# Patient Record
Sex: FEMALE | Race: AMERICAN INDIAN OR ALASKA NATIVE | NOT HISPANIC OR LATINO | Employment: FULL TIME | URBAN - METROPOLITAN AREA
[De-identification: names, ages, dates, MRNs, and addresses within clinical notes are randomized per-mention and may not be internally consistent; named-entity substitution may affect disease eponyms.]

---

## 2017-04-04 ENCOUNTER — OFFICE VISIT - HEALTHEAST (OUTPATIENT)
Dept: INTERNAL MEDICINE | Facility: CLINIC | Age: 37
End: 2017-04-04

## 2017-04-04 DIAGNOSIS — R20.0 NUMBNESS IN RIGHT LEG: ICD-10-CM

## 2017-04-04 DIAGNOSIS — Z00.00 ROUTINE GENERAL MEDICAL EXAMINATION AT A HEALTH CARE FACILITY: ICD-10-CM

## 2017-04-04 DIAGNOSIS — J01.11 ACUTE RECURRENT FRONTAL SINUSITIS: ICD-10-CM

## 2017-04-04 ASSESSMENT — MIFFLIN-ST. JEOR: SCORE: 1404.95

## 2017-04-27 ENCOUNTER — OFFICE VISIT - HEALTHEAST (OUTPATIENT)
Dept: ALLERGY | Facility: CLINIC | Age: 37
End: 2017-04-27

## 2017-04-27 DIAGNOSIS — R09.81 NASAL CONGESTION: ICD-10-CM

## 2017-05-01 ENCOUNTER — HOSPITAL ENCOUNTER (OUTPATIENT)
Dept: CT IMAGING | Facility: HOSPITAL | Age: 37
Discharge: HOME | End: 2017-05-01
Attending: INTERNAL MEDICINE

## 2017-05-01 DIAGNOSIS — J01.11 ACUTE RECURRENT FRONTAL SINUSITIS: ICD-10-CM

## 2017-05-05 ENCOUNTER — RECORDS - HEALTHEAST (OUTPATIENT)
Dept: ADMINISTRATIVE | Facility: OTHER | Age: 37
End: 2017-05-05

## 2017-08-23 ENCOUNTER — RECORDS - HEALTHEAST (OUTPATIENT)
Dept: ADMINISTRATIVE | Facility: OTHER | Age: 37
End: 2017-08-23

## 2017-09-14 ENCOUNTER — AMBULATORY - HEALTHEAST (OUTPATIENT)
Dept: CARDIOLOGY | Facility: CLINIC | Age: 37
End: 2017-09-14

## 2017-09-14 ENCOUNTER — RECORDS - HEALTHEAST (OUTPATIENT)
Dept: ADMINISTRATIVE | Facility: OTHER | Age: 37
End: 2017-09-14

## 2017-09-14 ENCOUNTER — HOSPITAL ENCOUNTER (OUTPATIENT)
Dept: CARDIOLOGY | Facility: HOSPITAL | Age: 37
Discharge: HOME | End: 2017-09-14
Attending: INTERNAL MEDICINE

## 2017-09-14 ENCOUNTER — OFFICE VISIT - HEALTHEAST (OUTPATIENT)
Dept: CARDIOLOGY | Facility: CLINIC | Age: 37
End: 2017-09-14

## 2017-09-14 DIAGNOSIS — R00.2 PALPITATIONS: ICD-10-CM

## 2017-09-14 ASSESSMENT — MIFFLIN-ST. JEOR: SCORE: 1395.03

## 2018-02-13 ENCOUNTER — OFFICE VISIT - HEALTHEAST (OUTPATIENT)
Dept: INTERNAL MEDICINE | Facility: CLINIC | Age: 38
End: 2018-02-13

## 2018-02-13 DIAGNOSIS — E28.2 PCOS (POLYCYSTIC OVARIAN SYNDROME): ICD-10-CM

## 2018-02-13 DIAGNOSIS — N63.0 BREAST LUMP IN FEMALE: ICD-10-CM

## 2018-02-13 DIAGNOSIS — G43.909 MIGRAINE: ICD-10-CM

## 2018-02-13 LAB
ESTRADIOL SERPL-MCNC: 33 PG/ML
FASTING STATUS PATIENT QL REPORTED: NO
FSH SERPL-ACNC: 4.5 MIU/ML
GLUCOSE BLD-MCNC: 88 MG/DL (ref 70–125)
LH SERPL-ACNC: 4.5 MIU/ML
PROGEST SERPL-MCNC: 0.3 NG/ML
TSH SERPL DL<=0.005 MIU/L-ACNC: 1.46 UIU/ML (ref 0.3–5)

## 2018-03-13 ENCOUNTER — HOSPITAL ENCOUNTER (OUTPATIENT)
Dept: ULTRASOUND IMAGING | Facility: CLINIC | Age: 38
Discharge: HOME | End: 2018-03-13
Attending: INTERNAL MEDICINE

## 2018-03-13 ENCOUNTER — HOSPITAL ENCOUNTER (OUTPATIENT)
Dept: MAMMOGRAPHY | Facility: CLINIC | Age: 38
Discharge: HOME | End: 2018-03-13
Attending: INTERNAL MEDICINE

## 2018-03-13 DIAGNOSIS — N63.0 BREAST LUMP IN FEMALE: ICD-10-CM

## 2018-04-20 ENCOUNTER — RECORDS - HEALTHEAST (OUTPATIENT)
Dept: ADMINISTRATIVE | Facility: OTHER | Age: 38
End: 2018-04-20

## 2018-04-25 ENCOUNTER — HOSPITAL ENCOUNTER (OUTPATIENT)
Dept: ULTRASOUND IMAGING | Facility: HOSPITAL | Age: 38
Discharge: HOME | End: 2018-04-25
Attending: INTERNAL MEDICINE

## 2018-04-25 DIAGNOSIS — R10.13 EPIGASTRIC PAIN: ICD-10-CM

## 2018-04-26 ENCOUNTER — RECORDS - HEALTHEAST (OUTPATIENT)
Dept: ADMINISTRATIVE | Facility: OTHER | Age: 38
End: 2018-04-26

## 2018-05-31 ENCOUNTER — RECORDS - HEALTHEAST (OUTPATIENT)
Dept: ADMINISTRATIVE | Facility: OTHER | Age: 38
End: 2018-05-31

## 2018-07-19 ENCOUNTER — COMMUNICATION - HEALTHEAST (OUTPATIENT)
Dept: INTERNAL MEDICINE | Facility: CLINIC | Age: 38
End: 2018-07-19

## 2018-07-19 DIAGNOSIS — R63.5 ABNORMAL WEIGHT GAIN: ICD-10-CM

## 2019-01-08 ENCOUNTER — OFFICE VISIT - HEALTHEAST (OUTPATIENT)
Dept: INTERNAL MEDICINE | Facility: CLINIC | Age: 39
End: 2019-01-08

## 2019-01-08 DIAGNOSIS — D72.818 OTHER DECREASED WHITE BLOOD CELL (WBC) COUNT: ICD-10-CM

## 2019-01-08 DIAGNOSIS — Z00.00 ROUTINE GENERAL MEDICAL EXAMINATION AT A HEALTH CARE FACILITY: ICD-10-CM

## 2019-01-08 DIAGNOSIS — R10.13 DYSPEPSIA: ICD-10-CM

## 2019-01-08 DIAGNOSIS — G43.909 MIGRAINE: ICD-10-CM

## 2019-01-08 DIAGNOSIS — E28.2 PCOS (POLYCYSTIC OVARIAN SYNDROME): ICD-10-CM

## 2019-01-08 ASSESSMENT — MIFFLIN-ST. JEOR: SCORE: 1462.17

## 2019-01-09 ENCOUNTER — AMBULATORY - HEALTHEAST (OUTPATIENT)
Dept: LAB | Facility: HOSPITAL | Age: 39
End: 2019-01-09

## 2019-01-09 DIAGNOSIS — D72.818 OTHER DECREASED WHITE BLOOD CELL (WBC) COUNT: ICD-10-CM

## 2019-01-09 DIAGNOSIS — R63.5 ABNORMAL WEIGHT GAIN: ICD-10-CM

## 2019-01-09 DIAGNOSIS — E28.2 PCOS (POLYCYSTIC OVARIAN SYNDROME): ICD-10-CM

## 2019-01-09 DIAGNOSIS — Z00.00 ROUTINE GENERAL MEDICAL EXAMINATION AT A HEALTH CARE FACILITY: ICD-10-CM

## 2019-01-09 LAB
ALBUMIN SERPL-MCNC: 4 G/DL (ref 3.5–5)
ALP SERPL-CCNC: 40 U/L (ref 45–120)
ALT SERPL W P-5'-P-CCNC: 39 U/L (ref 0–45)
ANION GAP SERPL CALCULATED.3IONS-SCNC: 12 MMOL/L (ref 5–18)
AST SERPL W P-5'-P-CCNC: 44 U/L (ref 0–40)
BASOPHILS # BLD AUTO: 0 THOU/UL (ref 0–0.2)
BASOPHILS NFR BLD AUTO: 1 % (ref 0–2)
BILIRUB SERPL-MCNC: 0.6 MG/DL (ref 0–1)
BUN SERPL-MCNC: 9 MG/DL (ref 8–22)
CALCIUM SERPL-MCNC: 9.4 MG/DL (ref 8.5–10.5)
CHLORIDE BLD-SCNC: 107 MMOL/L (ref 98–107)
CHOLEST SERPL-MCNC: 184 MG/DL
CO2 SERPL-SCNC: 21 MMOL/L (ref 22–31)
CREAT SERPL-MCNC: 0.84 MG/DL (ref 0.6–1.1)
EOSINOPHIL # BLD AUTO: 0.1 THOU/UL (ref 0–0.4)
EOSINOPHIL NFR BLD AUTO: 2 % (ref 0–6)
ERYTHROCYTE [DISTWIDTH] IN BLOOD BY AUTOMATED COUNT: 12.1 % (ref 11–14.5)
FASTING STATUS PATIENT QL REPORTED: YES
GFR SERPL CREATININE-BSD FRML MDRD: >60 ML/MIN/1.73M2
GLUCOSE BLD-MCNC: 79 MG/DL (ref 70–125)
HCT VFR BLD AUTO: 39.2 % (ref 35–47)
HDLC SERPL-MCNC: 52 MG/DL
HGB BLD-MCNC: 13.4 G/DL (ref 12–16)
LDLC SERPL CALC-MCNC: 117 MG/DL
LYMPHOCYTES # BLD AUTO: 1.1 THOU/UL (ref 0.8–4.4)
LYMPHOCYTES NFR BLD AUTO: 23 % (ref 20–40)
MCH RBC QN AUTO: 32 PG (ref 27–34)
MCHC RBC AUTO-ENTMCNC: 34.2 G/DL (ref 32–36)
MCV RBC AUTO: 94 FL (ref 80–100)
MONOCYTES # BLD AUTO: 0.5 THOU/UL (ref 0–0.9)
MONOCYTES NFR BLD AUTO: 11 % (ref 2–10)
NEUTROPHILS # BLD AUTO: 2.8 THOU/UL (ref 2–7.7)
NEUTROPHILS NFR BLD AUTO: 62 % (ref 50–70)
PLATELET # BLD AUTO: 230 THOU/UL (ref 140–440)
PMV BLD AUTO: 9.9 FL (ref 8.5–12.5)
POTASSIUM BLD-SCNC: 4.6 MMOL/L (ref 3.5–5)
PROT SERPL-MCNC: 6.7 G/DL (ref 6–8)
RBC # BLD AUTO: 4.19 MILL/UL (ref 3.8–5.4)
SODIUM SERPL-SCNC: 140 MMOL/L (ref 136–145)
TRIGL SERPL-MCNC: 77 MG/DL
TSH SERPL DL<=0.005 MIU/L-ACNC: 0.93 UIU/ML (ref 0.3–5)
WBC: 4.5 THOU/UL (ref 4–11)

## 2019-02-01 ENCOUNTER — AMBULATORY - HEALTHEAST (OUTPATIENT)
Dept: MULTI SPECIALTY CLINIC | Facility: CLINIC | Age: 39
End: 2019-02-01

## 2019-02-01 LAB — PAP SMEAR - HIM PATIENT REPORTED: NORMAL

## 2019-07-19 ENCOUNTER — OFFICE VISIT - HEALTHEAST (OUTPATIENT)
Dept: FAMILY MEDICINE | Facility: CLINIC | Age: 39
End: 2019-07-19

## 2019-07-19 DIAGNOSIS — Z00.00 ROUTINE GENERAL MEDICAL EXAMINATION AT A HEALTH CARE FACILITY: ICD-10-CM

## 2019-07-19 DIAGNOSIS — Z12.4 SCREENING FOR CERVICAL CANCER: ICD-10-CM

## 2019-07-19 DIAGNOSIS — G43.109 MIGRAINE WITH AURA AND WITHOUT STATUS MIGRAINOSUS, NOT INTRACTABLE: ICD-10-CM

## 2019-07-19 DIAGNOSIS — E28.2 PCOS (POLYCYSTIC OVARIAN SYNDROME): ICD-10-CM

## 2019-07-19 DIAGNOSIS — Z13.228 SCREENING FOR METABOLIC DISORDER: ICD-10-CM

## 2019-07-19 LAB
CHOLEST SERPL-MCNC: 205 MG/DL
FASTING STATUS PATIENT QL REPORTED: YES
HBA1C MFR BLD: 5.4 % (ref 3.5–6)
HDLC SERPL-MCNC: 52 MG/DL
LDLC SERPL CALC-MCNC: 133 MG/DL
TRIGL SERPL-MCNC: 101 MG/DL
TSH SERPL DL<=0.005 MIU/L-ACNC: 1.49 UIU/ML (ref 0.3–5)

## 2019-07-19 ASSESSMENT — MIFFLIN-ST. JEOR: SCORE: 1410.31

## 2019-07-25 ENCOUNTER — COMMUNICATION - HEALTHEAST (OUTPATIENT)
Dept: FAMILY MEDICINE | Facility: CLINIC | Age: 39
End: 2019-07-25

## 2019-07-29 ENCOUNTER — OFFICE VISIT - HEALTHEAST (OUTPATIENT)
Dept: FAMILY MEDICINE | Facility: CLINIC | Age: 39
End: 2019-07-29

## 2019-07-29 DIAGNOSIS — M25.512 ACUTE PAIN OF LEFT SHOULDER: ICD-10-CM

## 2019-07-29 DIAGNOSIS — R07.81 RIB PAIN ON LEFT SIDE: ICD-10-CM

## 2019-08-26 ENCOUNTER — RECORDS - HEALTHEAST (OUTPATIENT)
Dept: ADMINISTRATIVE | Facility: OTHER | Age: 39
End: 2019-08-26

## 2019-09-04 ENCOUNTER — HOSPITAL ENCOUNTER (OUTPATIENT)
Dept: MRI IMAGING | Facility: CLINIC | Age: 39
Discharge: HOME | End: 2019-09-04
Attending: PSYCHIATRY & NEUROLOGY

## 2019-09-04 DIAGNOSIS — G43.909 MIGRAINE: ICD-10-CM

## 2019-11-26 ENCOUNTER — RECORDS - HEALTHEAST (OUTPATIENT)
Dept: ADMINISTRATIVE | Facility: OTHER | Age: 39
End: 2019-11-26

## 2019-11-29 ENCOUNTER — HOSPITAL ENCOUNTER (OUTPATIENT)
Dept: MRI IMAGING | Facility: CLINIC | Age: 39
Discharge: HOME | End: 2019-11-29
Attending: PSYCHIATRY & NEUROLOGY

## 2019-11-29 DIAGNOSIS — R25.3 MUSCLE TWITCHING: ICD-10-CM

## 2019-12-05 ENCOUNTER — RECORDS - HEALTHEAST (OUTPATIENT)
Dept: LAB | Facility: HOSPITAL | Age: 39
End: 2019-12-05

## 2019-12-05 LAB
ALBUMIN SERPL-MCNC: 4.1 G/DL (ref 3.5–5)
ALP SERPL-CCNC: 39 U/L (ref 45–120)
ALT SERPL W P-5'-P-CCNC: 20 U/L (ref 0–45)
ANION GAP SERPL CALCULATED.3IONS-SCNC: 6 MMOL/L (ref 5–18)
AST SERPL W P-5'-P-CCNC: 21 U/L (ref 0–40)
BILIRUB SERPL-MCNC: 0.5 MG/DL (ref 0–1)
BUN SERPL-MCNC: 14 MG/DL (ref 8–22)
CALCIUM SERPL-MCNC: 9.3 MG/DL (ref 8.5–10.5)
CHLORIDE BLD-SCNC: 108 MMOL/L (ref 98–107)
CK SERPL-CCNC: 256 U/L (ref 30–190)
CO2 SERPL-SCNC: 25 MMOL/L (ref 22–31)
CREAT SERPL-MCNC: 0.93 MG/DL (ref 0.6–1.1)
ERYTHROCYTE [SEDIMENTATION RATE] IN BLOOD BY WESTERGREN METHOD: 4 MM/HR (ref 0–20)
FASTING STATUS PATIENT QL REPORTED: YES
FERRITIN SERPL-MCNC: 55 NG/ML (ref 10–130)
GFR SERPL CREATININE-BSD FRML MDRD: >60 ML/MIN/1.73M2
GLUCOSE BLD-MCNC: 89 MG/DL (ref 70–125)
GLUCOSE BLD-MCNC: 89 MG/DL (ref 70–125)
HBA1C MFR BLD: 5 % (ref 4.2–6.1)
LYME TOTAL ANTIBODY - HISTORICAL: 0.06 INDEX VALUE
MAGNESIUM SERPL-MCNC: 1.9 MG/DL (ref 1.8–2.6)
POTASSIUM BLD-SCNC: 4.3 MMOL/L (ref 3.5–5)
PROT SERPL-MCNC: 7.2 G/DL (ref 6–8)
SODIUM SERPL-SCNC: 139 MMOL/L (ref 136–145)
TSH SERPL DL<=0.005 MIU/L-ACNC: 0.87 UIU/ML (ref 0.3–5)
VIT B12 SERPL-MCNC: 753 PG/ML (ref 213–816)

## 2019-12-07 LAB — METHYLMALONATE SERPL-SCNC: 0.15 UMOL/L (ref 0–0.4)

## 2019-12-08 LAB — VIT B1 PYROPHOSHATE BLD-SCNC: 143 NMOL/L (ref 70–180)

## 2019-12-09 LAB
ALBUMIN PERCENT: 65.7 % (ref 51–67)
ALBUMIN SERPL ELPH-MCNC: 4.4 G/DL (ref 3.2–4.7)
ALPHA 1 PERCENT: 2.7 % (ref 2–4)
ALPHA 2 PERCENT: 9.5 % (ref 5–13)
ALPHA1 GLOB SERPL ELPH-MCNC: 0.2 G/DL (ref 0.1–0.3)
ALPHA2 GLOB SERPL ELPH-MCNC: 0.6 G/DL (ref 0.4–0.9)
ANA SER QL: 1.5 U
B-GLOBULIN SERPL ELPH-MCNC: 0.7 G/DL (ref 0.7–1.2)
BETA PERCENT: 10.2 % (ref 10–17)
GAMMA GLOB SERPL ELPH-MCNC: 0.8 G/DL (ref 0.6–1.4)
GAMMA GLOBULIN PERCENT: 11.9 % (ref 9–20)
PATH ICD:: NORMAL
PROT PATTERN SERPL ELPH-IMP: NORMAL
PROT SERPL-MCNC: 6.7 G/DL (ref 6–8)
REVIEWING PATHOLOGIST: NORMAL

## 2020-01-22 ENCOUNTER — RECORDS - HEALTHEAST (OUTPATIENT)
Dept: LAB | Facility: CLINIC | Age: 40
End: 2020-01-22

## 2020-01-22 LAB — CK SERPL-CCNC: 398 U/L (ref 30–190)

## 2020-02-12 ENCOUNTER — RECORDS - HEALTHEAST (OUTPATIENT)
Dept: ADMINISTRATIVE | Facility: OTHER | Age: 40
End: 2020-02-12

## 2020-02-26 ENCOUNTER — OFFICE VISIT - HEALTHEAST (OUTPATIENT)
Dept: FAMILY MEDICINE | Facility: CLINIC | Age: 40
End: 2020-02-26

## 2020-02-26 DIAGNOSIS — G43.C1 INTRACTABLE PERIODIC HEADACHE SYNDROME: ICD-10-CM

## 2020-02-26 DIAGNOSIS — Z01.818 PREOP EXAMINATION: ICD-10-CM

## 2020-02-26 DIAGNOSIS — Z13.220 SCREENING FOR LIPOID DISORDERS: ICD-10-CM

## 2020-02-26 DIAGNOSIS — E28.2 PCOS (POLYCYSTIC OVARIAN SYNDROME): ICD-10-CM

## 2020-02-26 DIAGNOSIS — M51.369 DDD (DEGENERATIVE DISC DISEASE), LUMBAR: ICD-10-CM

## 2020-02-26 LAB
ANION GAP SERPL CALCULATED.3IONS-SCNC: 8 MMOL/L (ref 5–18)
BUN SERPL-MCNC: 10 MG/DL (ref 8–22)
CALCIUM SERPL-MCNC: 9 MG/DL (ref 8.5–10.5)
CHLORIDE BLD-SCNC: 106 MMOL/L (ref 98–107)
CHOLEST SERPL-MCNC: 159 MG/DL
CK SERPL-CCNC: 121 U/L (ref 30–190)
CO2 SERPL-SCNC: 26 MMOL/L (ref 22–31)
CREAT SERPL-MCNC: 0.75 MG/DL (ref 0.6–1.1)
ERYTHROCYTE [DISTWIDTH] IN BLOOD BY AUTOMATED COUNT: 11.5 % (ref 11–14.5)
FASTING STATUS PATIENT QL REPORTED: YES
GFR SERPL CREATININE-BSD FRML MDRD: >60 ML/MIN/1.73M2
GLUCOSE BLD-MCNC: 99 MG/DL (ref 70–125)
HCT VFR BLD AUTO: 37.7 % (ref 35–47)
HDLC SERPL-MCNC: 40 MG/DL
HGB BLD-MCNC: 12.7 G/DL (ref 12–16)
LDLC SERPL CALC-MCNC: 94 MG/DL
MCH RBC QN AUTO: 32.7 PG (ref 27–34)
MCHC RBC AUTO-ENTMCNC: 33.7 G/DL (ref 32–36)
MCV RBC AUTO: 97 FL (ref 80–100)
PLATELET # BLD AUTO: 199 THOU/UL (ref 140–440)
PMV BLD AUTO: 7.7 FL (ref 7–10)
POTASSIUM BLD-SCNC: 3.9 MMOL/L (ref 3.5–5)
RBC # BLD AUTO: 3.89 MILL/UL (ref 3.8–5.4)
SODIUM SERPL-SCNC: 140 MMOL/L (ref 136–145)
TRIGL SERPL-MCNC: 127 MG/DL
WBC: 3.3 THOU/UL (ref 4–11)

## 2020-02-26 ASSESSMENT — MIFFLIN-ST. JEOR: SCORE: 1434.35

## 2020-03-02 ASSESSMENT — MIFFLIN-ST. JEOR: SCORE: 1430.75

## 2020-03-04 ENCOUNTER — ANESTHESIA - HEALTHEAST (OUTPATIENT)
Dept: SURGERY | Facility: CLINIC | Age: 40
End: 2020-03-04

## 2020-03-05 ENCOUNTER — SURGERY - HEALTHEAST (OUTPATIENT)
Dept: SURGERY | Facility: CLINIC | Age: 40
End: 2020-03-05

## 2020-03-27 ENCOUNTER — AMBULATORY - HEALTHEAST (OUTPATIENT)
Dept: FAMILY MEDICINE | Facility: CLINIC | Age: 40
End: 2020-03-27

## 2020-08-11 ENCOUNTER — HOSPITAL ENCOUNTER (OUTPATIENT)
Dept: MAMMOGRAPHY | Facility: CLINIC | Age: 40
Discharge: HOME | End: 2020-08-11

## 2020-08-11 DIAGNOSIS — Z12.31 SCREENING MAMMOGRAM, ENCOUNTER FOR: ICD-10-CM

## 2020-08-31 ENCOUNTER — OFFICE VISIT - HEALTHEAST (OUTPATIENT)
Dept: FAMILY MEDICINE | Facility: CLINIC | Age: 40
End: 2020-08-31

## 2020-08-31 DIAGNOSIS — F41.1 GAD (GENERALIZED ANXIETY DISORDER): ICD-10-CM

## 2020-08-31 DIAGNOSIS — F32.1 CURRENT MODERATE EPISODE OF MAJOR DEPRESSIVE DISORDER WITHOUT PRIOR EPISODE (H): ICD-10-CM

## 2020-08-31 ASSESSMENT — PATIENT HEALTH QUESTIONNAIRE - PHQ9: SUM OF ALL RESPONSES TO PHQ QUESTIONS 1-9: 11

## 2020-08-31 ASSESSMENT — ANXIETY QUESTIONNAIRES
1. FEELING NERVOUS, ANXIOUS, OR ON EDGE: NEARLY EVERY DAY
IF YOU CHECKED OFF ANY PROBLEMS ON THIS QUESTIONNAIRE, HOW DIFFICULT HAVE THESE PROBLEMS MADE IT FOR YOU TO DO YOUR WORK, TAKE CARE OF THINGS AT HOME, OR GET ALONG WITH OTHER PEOPLE: VERY DIFFICULT
7. FEELING AFRAID AS IF SOMETHING AWFUL MIGHT HAPPEN: NEARLY EVERY DAY
6. BECOMING EASILY ANNOYED OR IRRITABLE: NEARLY EVERY DAY
3. WORRYING TOO MUCH ABOUT DIFFERENT THINGS: NEARLY EVERY DAY
GAD7 TOTAL SCORE: 20
5. BEING SO RESTLESS THAT IT IS HARD TO SIT STILL: NEARLY EVERY DAY
2. NOT BEING ABLE TO STOP OR CONTROL WORRYING: NEARLY EVERY DAY
4. TROUBLE RELAXING: MORE THAN HALF THE DAYS

## 2020-09-08 ENCOUNTER — COMMUNICATION - HEALTHEAST (OUTPATIENT)
Dept: FAMILY MEDICINE | Facility: CLINIC | Age: 40
End: 2020-09-08

## 2020-09-08 DIAGNOSIS — B37.31 YEAST INFECTION OF THE VAGINA: ICD-10-CM

## 2020-11-24 ENCOUNTER — RECORDS - HEALTHEAST (OUTPATIENT)
Dept: LAB | Facility: CLINIC | Age: 40
End: 2020-11-24

## 2020-11-24 LAB — CK SERPL-CCNC: 454 U/L (ref 30–190)

## 2020-11-25 PROBLEM — E28.2 PCOS (POLYCYSTIC OVARIAN SYNDROME): Status: ACTIVE | Noted: 2019-07-19

## 2020-11-25 PROBLEM — M48.061 FORAMINAL STENOSIS OF LUMBAR REGION: Status: ACTIVE | Noted: 2020-11-25

## 2020-11-25 PROBLEM — R25.3 MUSCLE TWITCH: Status: ACTIVE | Noted: 2020-11-25

## 2020-11-25 PROBLEM — R74.8 ELEVATED CREATINE KINASE LEVEL: Status: ACTIVE | Noted: 2020-11-25

## 2020-11-25 SDOH — HEALTH STABILITY: MENTAL HEALTH: HOW OFTEN DO YOU HAVE 6 OR MORE DRINKS ON ONE OCCASION?: NOT ASKED

## 2020-11-25 SDOH — HEALTH STABILITY: MENTAL HEALTH: HOW MANY STANDARD DRINKS CONTAINING ALCOHOL DO YOU HAVE ON A TYPICAL DAY?: NOT ASKED

## 2020-11-25 SDOH — HEALTH STABILITY: MENTAL HEALTH: HOW OFTEN DO YOU HAVE A DRINK CONTAINING ALCOHOL?: NOT ASKED

## 2020-11-27 ENCOUNTER — VIRTUAL VISIT (OUTPATIENT)
Dept: NEUROLOGY | Facility: CLINIC | Age: 40
End: 2020-11-27
Payer: COMMERCIAL

## 2020-11-27 ENCOUNTER — RECORDS - HEALTHEAST (OUTPATIENT)
Dept: ADMINISTRATIVE | Facility: OTHER | Age: 40
End: 2020-11-27

## 2020-11-27 VITALS — HEIGHT: 66 IN | WEIGHT: 170 LBS | BODY MASS INDEX: 27.32 KG/M2

## 2020-11-27 DIAGNOSIS — R74.8 ELEVATED CK: ICD-10-CM

## 2020-11-27 DIAGNOSIS — M48.061 LUMBAR FORAMINAL STENOSIS: ICD-10-CM

## 2020-11-27 DIAGNOSIS — G43.809 OTHER MIGRAINE WITHOUT STATUS MIGRAINOSUS, NOT INTRACTABLE: Primary | ICD-10-CM

## 2020-11-27 DIAGNOSIS — F43.9 STRESS: ICD-10-CM

## 2020-11-27 DIAGNOSIS — R25.3 MUSCLE TWITCHING: ICD-10-CM

## 2020-11-27 PROCEDURE — 99214 OFFICE O/P EST MOD 30 MIN: CPT | Mod: 95 | Performed by: PSYCHIATRY & NEUROLOGY

## 2020-11-27 RX ORDER — SPIRONOLACTONE 50 MG/1
50 TABLET, FILM COATED ORAL
COMMUNITY
Start: 2020-02-26 | End: 2022-04-04

## 2020-11-27 RX ORDER — ONDANSETRON 8 MG/1
TABLET, FILM COATED ORAL
COMMUNITY
Start: 2020-03-06 | End: 2021-11-02

## 2020-11-27 RX ORDER — HYDROXYZINE PAMOATE 25 MG/1
CAPSULE ORAL
COMMUNITY
Start: 2020-03-05 | End: 2021-11-02

## 2020-11-27 RX ORDER — SUMATRIPTAN 50 MG/1
50 TABLET, FILM COATED ORAL
Qty: 18 TABLET | Refills: 11 | Status: SHIPPED | OUTPATIENT
Start: 2020-11-27 | End: 2021-09-01

## 2020-11-27 RX ORDER — ESCITALOPRAM OXALATE 10 MG/1
5 TABLET ORAL
COMMUNITY
Start: 2020-08-31 | End: 2022-09-19

## 2020-11-27 RX ORDER — OXYCODONE HYDROCHLORIDE 5 MG/1
TABLET ORAL
COMMUNITY
Start: 2020-03-06 | End: 2021-11-02

## 2020-11-27 RX ORDER — TOPIRAMATE 25 MG/1
25 TABLET, FILM COATED ORAL DAILY
COMMUNITY
Start: 2020-04-22 | End: 2021-09-01

## 2020-11-27 RX ORDER — SUMATRIPTAN 25 MG/1
25 TABLET, FILM COATED ORAL
COMMUNITY
Start: 2019-09-26 | End: 2021-02-23

## 2020-11-27 RX ORDER — CLONAZEPAM 0.5 MG/1
0.5 TABLET ORAL PRN
COMMUNITY
Start: 2020-08-31 | End: 2021-11-02

## 2020-11-27 RX ORDER — ACETAMINOPHEN 500 MG
500 TABLET ORAL
COMMUNITY
Start: 2020-03-06

## 2020-11-27 ASSESSMENT — MIFFLIN-ST. JEOR: SCORE: 1457.86

## 2020-11-27 NOTE — NURSING NOTE
Chief Complaint   Patient presents with     Headache     6 month follow up for migraines. Pt states doing well over the summer, migraines have increased over the past couple of months.     Video Visit     AW Touchpoint send text to [phone]     Tracie Patrick LPN on 11/27/2020 at 11:24 AM

## 2020-11-27 NOTE — LETTER
"    11/27/2020         RE: Larissa Elliott  [address] [address]        Dear Colleague,    Thank you for referring your patient, Larissa Elliott, to the [facility] [place]. Please see a copy of my visit note below.    NEUROLOGY OUTPATIENT PROGRESS NOTE (VIDEO)  Nov 27, 2020     CHIEF COMPLAINT/REASON FOR VISIT/REASON FOR CONSULT  Patient presents with:  Headache: 6 month follow up for migraines. Pt states doing well over the summer, migraines have increased over the past couple of months.  Video Visit: AW Touchpoint send text to [phone]    REASON FOR CONSULTATION- Headaches    Video Visit Consent  Patient is being evaluated via a billable video visit. The patient has been notified of following:   \"This video visit will be conducted via a call between you and your physician/provider. We have found that certain health care needs can be provided without the need for an in-person physical exam. This service lets us provide the care you need with a video conversation. If a prescription is necessary we can send it directly to your pharmacy. If lab work is needed we can place an order for that and you can then stop by our lab to have the test done at a later time.  If during the course of the call the physician/provider feels a video visit is not appropriate, you will not be charged for this service.  Physician has received verbal consent for a Video Visit from the patient? YES  Patient would like the video invitation sent by: Email/SMS    Video Visit Details  Type of service: Video Visit  Video Start Time: 11:56  Video End Time (time video stopped):12:07  Originating Location (pt. Location): Patient's Home  Distant Location (provider location): [facility] [place]   Mode of Communication: Video Conference via [employer]        HISTORY OF PRESENT ILLNESS  Larissa Elliott is a 40 year old female seen for evaluation of headaches. Headache started when " she was younger in college. Headaches were once every week. Headaches are more on the left side throbbing. There was photophobia and photophobia. She does feel nauseous with the headaches. Maxalt caused side effects as result she was switched to Imitrex. She has found walnuts to be a trigger for headaches. Birth control in the placebo week has also been thought to be causing her headaches. She was started on Topamax 25 mg and the headaches have decreased in frequency. Since last visit headaches have been under good control except in the last week where she had continuous multiple headaches. She is not sure what made the headaches worse possibly with changes in season. Imitrex does work as abortive therapy. She is using Excedrin Migraine for milder headaches. There is still around 2 a month except for the rare flareup like last week.    She previously complained of pain in the left leg. She does have a history of L5-S1 microdiscectomy. Pain is more of a cramping/twitching sensation in the thigh and the foot of the left side. MRI lumbar spine did not show any significant lesions that would explain this. It did show previous surgical changes. EMG was negative as well. Blood work was checked for muscle disease and her CK was elevated but no clear cause for it was identified. Repeat CK was also elevated. More recent CK checked by her primary care doctor came back normal.    Since last time patient had worsening pain on the right side. She was identified to have a disc herniation and needed urgent surgery. She is about 6 weeks postop. Her strength is coming back and her balance is improving. Denies any other neurological issues. Patient denies any triggers that might have caused the disc to herniate.    11/28/20  Patient returns today.  Headaches have slightly worsened.  She is having 5-6 headache days a month.  Headaches are unchanged in nature.  Generally the headaches would be once a week.  She does have vision issues  with the headache.  Imitrex does work as abortive therapy.  Patient will have shows if she is too late in taking the medication.  She also been put on antianxiety medication Klonopin recently.  She is going through her divorce which is causing her distress and making headaches worse.  Reports no other triggers.  Headaches unchanged in nature.  Remains on Topamax has issues with taste but no other side effects.    Patient continues to have pain in the right leg.  This more in the back and the hip.  She is planning on going and seeing her orthopedic surgeon again.    Patient denies any muscle pain or muscle twitching.  This was stopped.  CK was still elevated on repeat check.    Previous history is reviewed and this is unchanged.    PAST MEDICAL/SURGICAL HISTORY  No past medical history on file.  Patient Active Problem List   Diagnosis     Chronic migraine     Elevated creatine kinase level     Foraminal stenosis of lumbar region     Muscle twitch     PCOS (polycystic ovarian syndrome)     Symptomatic varicose veins, bilateral   Significant for migraine headaches. L5-S1 microdiscectomy surgery      FAMILY HISTORY  Family History   Problem Relation Age of Onset     Migraines Father      Migraines Brother      Seizure Disorder Brother        SOCIAL HISTORY  Social History     Tobacco Use     Smoking status: Never Smoker     Smokeless tobacco: Never Used   Substance Use Topics     Alcohol use: Yes     Comment: Special occasions only     Drug use: None       SYSTEMS REVIEW  Twelve-system ROS was done and other than the HPI this was negative.     MEDICATIONS       acetaminophen (TYLENOL) 500 MG tablet, Take 500 mg by mouth       cholecalciferol 25 MCG (1000 UT) TABS, Take 2,000 Units by mouth       clonazePAM (KLONOPIN) 0.5 MG tablet, Take 0.5 mg by mouth as needed       escitalopram (LEXAPRO) 10 MG tablet, Take 10 mg by mouth       hydrOXYzine (VISTARIL) 25 MG capsule,        Multiple Vitamins-Minerals (MULTIVITAMIN ADULT  "PO),        ondansetron (ZOFRAN) 8 MG tablet,        oxyCODONE (ROXICODONE) 5 MG tablet,        spironolactone (ALDACTONE) 50 MG tablet, Take 50 mg by mouth       SUMAtriptan (IMITREX) 25 MG tablet, Take 25 mg by mouth       topiramate (TOPAMAX) 25 MG tablet, Take 25 mg by mouth daily     No current facility-administered medications on file prior to visit.        PHYSICAL EXAMINATION  VITALS: Ht 1.676 m (5' 6\")   Wt 77.1 kg (170 lb)   BMI 27.44 kg/m    Exam was limited due to video encounter.    Vitals-Unable to do on video  GENERAL -Health appearing, No apparent distress  EYES- No scleral icterus, no eyelid droop, Pupils symmetric  HEENT - Normocephalic, atraumatic, Hearing grossly intact; Oral mucosa moist and pink in color. External Ears and nose intact.   Neck - soft/flexible with normal ROM on visual inspection.  PULM - Good spontaneous respiratory effort;  CV- No edema on visual inspection  MSK- Gait - see Neuro section; Strength and tone- see Neuro section; Range of motion grossly intact.  Psych- Normal mood and affect. Good judgment and insight.     Neurological  Mental status - Patient is awake and oriented. Attention span is normal. Language is fluent and follows commands appropriately.   Cranial nerves - Pupils are symmetric; EOMI, NLF symmetric  Motor - There is no pronator drift. Antigravity in all 4 ext.  Tone - No evidence of rigidity on visual inspection. No tremor.  Reflexes - Unable to do on video  Sensation - Unable to do on Video  Coordination - Finger to nose without dysmetria.   Gait and station - Romberg is negative. Gait is steady        DIAGNOSTICS  MRI L spine  1.  Interval postoperative changes left discectomy at L4-L5 with   resolution of previously present left paracentral disc protrusion. Mild   right lateral recess narrowing, similar to prior. Overall mild canal   narrowing. Mild to moderate bilateral foraminal   narrowing, similar to prior.  2.  Interval resolution of left " paracentral disc protrusion/extrusion at   L5-S1. No significant canal or foraminal narrowing.  3.  Tiny central disc protrusion with annular tear at L3-L4. Mild   bilateral lateral recess narrowing. Overall mild canal narrowing. No   significant change.  4.  Normal distal cord.      EMG  CLINICAL INTERPRETATION:  This is a normal nerve conduction and EMG study except absent left superficial peroneal sensory SNAP which could be artifactual or represent early sensory neuropathy. Further clinical correlation is needed.     MRI brain  MPRESSION:   1.  Normal head MRI.   2.  No acute/subacute infarction, intracranial hemorrhage, mass, mass   effect, or hydrocephalus.     RELEVANT LABS  .  Component      Latest Ref Rng & Units 12/5/2019 1/22/2020 2/26/2020 11/24/2020   CK, Total      30 - 190 U/L 256 (H) 398 (H) 121 454 (H)     Component Latest Ref Rng & Units 1/9/2019 7/19/2019   Sodium 136 - 145 mmol/L 140   Potassium 3.5 - 5.0 mmol/L 4.6   Chloride 98 - 107 mmol/L 107   CO2 22 - 31 mmol/L 21 (L)   Anion Gap, Calculation 5 - 18 mmol/L 12   Glucose 70 - 125 mg/dL 79   BUN 8 - 22 mg/dL 9   Creatinine 0.60 - 1.10 mg/dL 0.84   GFR MDRD Af Amer >60 mL/min/1.73m2 >60   GFR MDRD Non Af Amer >60 mL/min/1.73m2 >60   Bilirubin, Total 0.0 - 1.0 mg/dL 0.6   Calcium 8.5 - 10.5 mg/dL 9.4   Protein, Total 6.0 - 8.0 g/dL 6.7   ALBUMIN 3.5 - 5.0 g/dL 4.0   Alkaline Phosphatase 45 - 120 U/L 40 (L)   AST 0 - 40 U/L 44 (H)   ALT 0 - 45 U/L 39   Cholesterol <=199 mg/dL 205 (H)   Triglycerides <=149 mg/dL 101   HDL Cholesterol >=50 mg/dL 52   LDL Calculated <=129 mg/dL 133 (H)   Patient Fasting > 8hrs? Yes   Hemoglobin A1c 3.5 - 6.0 % 5.4   TSH 0.30 - 5.00 uIU/mL 1.49     IMPRESSION/REPORT/PLAN  Other migraine without status migrainosus, not intractable  Elevated CK  Muscle twitching  Stress  Lumbar foraminal stenosis    This is a 40 year old female episodic migraines.  They have worsened due to stress.  Encouraged her to deal with  the stress little bit better.  We will continue the Topamax at 25 mg every night.  Could increase the dose if the patient was willing to.  Currently she is using Klonopin and does not want to increase the Topamax.  Continue Imitrex for abortive therapy.    In terms of the elevated CK no clear cause has been identified.  Intermittently it has been normal.  I suspect this is normal for the patient and not pathological.  Could consider muscle biopsy the for right now would hold off.  Muscle fasciculations have further improved which is reassuring    In terms of her right-sided pain this could be related to the spine problems for which she had surgery.  She should follow-up with her surgeon.    -     SUMAtriptan (IMITREX) 50 MG tablet; Take 1 tablet (50 mg) by mouth at onset of headache for migraine May repeat in 2 hours. Max 2 tablets/24 hours.    Return in about 3 months (around 2/27/2021) for Virtual or clinic.     Over 25 minutes were spent coordinating the care for the patient today.  More than 50% of the time was spent counseling, educating the patient.    Rj Lopes MD  Neurologist  [facility]  Tel:- [phone]    This note was dictated using voice recognition software.  Any grammatical or context distortions are unintentional and inherent to the software.        Again, thank you for allowing me to participate in the care of your patient.        Sincerely,        Rj Lopes MD

## 2020-11-27 NOTE — PROGRESS NOTES
"NEUROLOGY OUTPATIENT PROGRESS NOTE (VIDEO)  Nov 27, 2020     CHIEF COMPLAINT/REASON FOR VISIT/REASON FOR CONSULT  Patient presents with:  Headache: 6 month follow up for migraines. Pt states doing well over the summer, migraines have increased over the past couple of months.  Video Visit: AW Touchpoint send text to [phone]    REASON FOR CONSULTATION- Headaches    Video Visit Consent  Patient is being evaluated via a billable video visit. The patient has been notified of following:   \"This video visit will be conducted via a call between you and your physician/provider. We have found that certain health care needs can be provided without the need for an in-person physical exam. This service lets us provide the care you need with a video conversation. If a prescription is necessary we can send it directly to your pharmacy. If lab work is needed we can place an order for that and you can then stop by our lab to have the test done at a later time.  If during the course of the call the physician/provider feels a video visit is not appropriate, you will not be charged for this service.  Physician has received verbal consent for a Video Visit from the patient? YES  Patient would like the video invitation sent by: Email/SMS    Video Visit Details  Type of service: Video Visit  Video Start Time: 11:56  Video End Time (time video stopped):12:07  Originating Location (pt. Location): Patient's Home  Distant Location (provider location): [facility] [place]   Mode of Communication: Video Conference via [employer]        HISTORY OF PRESENT ILLNESS  Larissa Elliott is a 40 year old female seen for evaluation of headaches. Headache started when she was younger in college. Headaches were once every week. Headaches are more on the left side throbbing. There was photophobia and photophobia. She does feel nauseous with the headaches. Maxalt caused side effects as result she was switched to Imitrex. She has " found walnuts to be a trigger for headaches. Birth control in the placebo week has also been thought to be causing her headaches. She was started on Topamax 25 mg and the headaches have decreased in frequency. Since last visit headaches have been under good control except in the last week where she had continuous multiple headaches. She is not sure what made the headaches worse possibly with changes in season. Imitrex does work as abortive therapy. She is using Excedrin Migraine for milder headaches. There is still around 2 a month except for the rare flareup like last week.    She previously complained of pain in the left leg. She does have a history of L5-S1 microdiscectomy. Pain is more of a cramping/twitching sensation in the thigh and the foot of the left side. MRI lumbar spine did not show any significant lesions that would explain this. It did show previous surgical changes. EMG was negative as well. Blood work was checked for muscle disease and her CK was elevated but no clear cause for it was identified. Repeat CK was also elevated. More recent CK checked by her primary care doctor came back normal.    Since last time patient had worsening pain on the right side. She was identified to have a disc herniation and needed urgent surgery. She is about 6 weeks postop. Her strength is coming back and her balance is improving. Denies any other neurological issues. Patient denies any triggers that might have caused the disc to herniate.    11/28/20  Patient returns today.  Headaches have slightly worsened.  She is having 5-6 headache days a month.  Headaches are unchanged in nature.  Generally the headaches would be once a week.  She does have vision issues with the headache.  Imitrex does work as abortive therapy.  Patient will have shows if she is too late in taking the medication.  She also been put on antianxiety medication Klonopin recently.  She is going through her divorce which is causing her distress and  making headaches worse.  Reports no other triggers.  Headaches unchanged in nature.  Remains on Topamax has issues with taste but no other side effects.    Patient continues to have pain in the right leg.  This more in the back and the hip.  She is planning on going and seeing her orthopedic surgeon again.    Patient denies any muscle pain or muscle twitching.  This was stopped.  CK was still elevated on repeat check.    Previous history is reviewed and this is unchanged.    PAST MEDICAL/SURGICAL HISTORY  No past medical history on file.  Patient Active Problem List   Diagnosis     Chronic migraine     Elevated creatine kinase level     Foraminal stenosis of lumbar region     Muscle twitch     PCOS (polycystic ovarian syndrome)     Symptomatic varicose veins, bilateral   Significant for migraine headaches. L5-S1 microdiscectomy surgery      FAMILY HISTORY  Family History   Problem Relation Age of Onset     Migraines Father      Migraines Brother      Seizure Disorder Brother        SOCIAL HISTORY  Social History     Tobacco Use     Smoking status: Never Smoker     Smokeless tobacco: Never Used   Substance Use Topics     Alcohol use: Yes     Comment: Special occasions only     Drug use: None       SYSTEMS REVIEW  Twelve-system ROS was done and other than the HPI this was negative.     MEDICATIONS       acetaminophen (TYLENOL) 500 MG tablet, Take 500 mg by mouth       cholecalciferol 25 MCG (1000 UT) TABS, Take 2,000 Units by mouth       clonazePAM (KLONOPIN) 0.5 MG tablet, Take 0.5 mg by mouth as needed       escitalopram (LEXAPRO) 10 MG tablet, Take 10 mg by mouth       hydrOXYzine (VISTARIL) 25 MG capsule,        Multiple Vitamins-Minerals (MULTIVITAMIN ADULT PO),        ondansetron (ZOFRAN) 8 MG tablet,        oxyCODONE (ROXICODONE) 5 MG tablet,        spironolactone (ALDACTONE) 50 MG tablet, Take 50 mg by mouth       SUMAtriptan (IMITREX) 25 MG tablet, Take 25 mg by mouth       topiramate (TOPAMAX) 25 MG tablet,  "Take 25 mg by mouth daily     No current facility-administered medications on file prior to visit.        PHYSICAL EXAMINATION  VITALS: Ht 1.676 m (5' 6\")   Wt 77.1 kg (170 lb)   BMI 27.44 kg/m    Exam was limited due to video encounter.    Vitals-Unable to do on video  GENERAL -Health appearing, No apparent distress  EYES- No scleral icterus, no eyelid droop, Pupils symmetric  HEENT - Normocephalic, atraumatic, Hearing grossly intact; Oral mucosa moist and pink in color. External Ears and nose intact.   Neck - soft/flexible with normal ROM on visual inspection.  PULM - Good spontaneous respiratory effort;  CV- No edema on visual inspection  MSK- Gait - see Neuro section; Strength and tone- see Neuro section; Range of motion grossly intact.  Psych- Normal mood and affect. Good judgment and insight.     Neurological  Mental status - Patient is awake and oriented. Attention span is normal. Language is fluent and follows commands appropriately.   Cranial nerves - Pupils are symmetric; EOMI, NLF symmetric  Motor - There is no pronator drift. Antigravity in all 4 ext.  Tone - No evidence of rigidity on visual inspection. No tremor.  Reflexes - Unable to do on video  Sensation - Unable to do on Video  Coordination - Finger to nose without dysmetria.   Gait and station - Romberg is negative. Gait is steady        DIAGNOSTICS  MRI L spine  1.  Interval postoperative changes left discectomy at L4-L5 with   resolution of previously present left paracentral disc protrusion. Mild   right lateral recess narrowing, similar to prior. Overall mild canal   narrowing. Mild to moderate bilateral foraminal   narrowing, similar to prior.  2.  Interval resolution of left paracentral disc protrusion/extrusion at   L5-S1. No significant canal or foraminal narrowing.  3.  Tiny central disc protrusion with annular tear at L3-L4. Mild   bilateral lateral recess narrowing. Overall mild canal narrowing. No   significant change.  4.  Normal " distal cord.      EMG  CLINICAL INTERPRETATION:  This is a normal nerve conduction and EMG study except absent left superficial peroneal sensory SNAP which could be artifactual or represent early sensory neuropathy. Further clinical correlation is needed.     MRI brain  MPRESSION:   1.  Normal head MRI.   2.  No acute/subacute infarction, intracranial hemorrhage, mass, mass   effect, or hydrocephalus.     RELEVANT LABS  .  Component      Latest Ref Rng & Units 12/5/2019 1/22/2020 2/26/2020 11/24/2020   CK, Total      30 - 190 U/L 256 (H) 398 (H) 121 454 (H)     Component Latest Ref Rng & Units 1/9/2019 7/19/2019   Sodium 136 - 145 mmol/L 140   Potassium 3.5 - 5.0 mmol/L 4.6   Chloride 98 - 107 mmol/L 107   CO2 22 - 31 mmol/L 21 (L)   Anion Gap, Calculation 5 - 18 mmol/L 12   Glucose 70 - 125 mg/dL 79   BUN 8 - 22 mg/dL 9   Creatinine 0.60 - 1.10 mg/dL 0.84   GFR MDRD Af Amer >60 mL/min/1.73m2 >60   GFR MDRD Non Af Amer >60 mL/min/1.73m2 >60   Bilirubin, Total 0.0 - 1.0 mg/dL 0.6   Calcium 8.5 - 10.5 mg/dL 9.4   Protein, Total 6.0 - 8.0 g/dL 6.7   ALBUMIN 3.5 - 5.0 g/dL 4.0   Alkaline Phosphatase 45 - 120 U/L 40 (L)   AST 0 - 40 U/L 44 (H)   ALT 0 - 45 U/L 39   Cholesterol <=199 mg/dL 205 (H)   Triglycerides <=149 mg/dL 101   HDL Cholesterol >=50 mg/dL 52   LDL Calculated <=129 mg/dL 133 (H)   Patient Fasting > 8hrs? Yes   Hemoglobin A1c 3.5 - 6.0 % 5.4   TSH 0.30 - 5.00 uIU/mL 1.49     IMPRESSION/REPORT/PLAN  Other migraine without status migrainosus, not intractable  Elevated CK  Muscle twitching  Stress  Lumbar foraminal stenosis    This is a 40 year old female episodic migraines.  They have worsened due to stress.  Encouraged her to deal with the stress little bit better.  We will continue the Topamax at 25 mg every night.  Could increase the dose if the patient was willing to.  Currently she is using Klonopin and does not want to increase the Topamax.  Continue Imitrex for abortive therapy.    In terms of  the elevated CK no clear cause has been identified.  Intermittently it has been normal.  I suspect this is normal for the patient and not pathological.  Could consider muscle biopsy the for right now would hold off.  Muscle fasciculations have further improved which is reassuring    In terms of her right-sided pain this could be related to the spine problems for which she had surgery.  She should follow-up with her surgeon.    -     SUMAtriptan (IMITREX) 50 MG tablet; Take 1 tablet (50 mg) by mouth at onset of headache for migraine May repeat in 2 hours. Max 2 tablets/24 hours.    Return in about 3 months (around 2/27/2021) for Virtual or clinic.     Over 25 minutes were spent coordinating the care for the patient today.  More than 50% of the time was spent counseling, educating the patient.    Rj Lopes MD  Neurologist  [facility]  Tel:- [phone]    This note was dictated using voice recognition software.  Any grammatical or context distortions are unintentional and inherent to the software.

## 2020-12-22 ENCOUNTER — RECORDS - HEALTHEAST (OUTPATIENT)
Dept: ADMINISTRATIVE | Facility: OTHER | Age: 40
End: 2020-12-22

## 2020-12-24 ENCOUNTER — RECORDS - HEALTHEAST (OUTPATIENT)
Dept: ADMINISTRATIVE | Facility: OTHER | Age: 40
End: 2020-12-24

## 2021-01-08 ENCOUNTER — AMBULATORY - HEALTHEAST (OUTPATIENT)
Dept: NEUROSURGERY | Facility: CLINIC | Age: 41
End: 2021-01-08

## 2021-01-08 DIAGNOSIS — M54.12 CERVICAL RADICULOPATHY: ICD-10-CM

## 2021-01-14 ENCOUNTER — HOSPITAL ENCOUNTER (OUTPATIENT)
Dept: PHYSICAL MEDICINE AND REHAB | Facility: CLINIC | Age: 41
Discharge: HOME | End: 2021-01-14
Attending: SURGERY

## 2021-01-14 DIAGNOSIS — M54.12 CERVICAL RADICULOPATHY: ICD-10-CM

## 2021-01-19 ENCOUNTER — RECORDS - HEALTHEAST (OUTPATIENT)
Dept: ADMINISTRATIVE | Facility: OTHER | Age: 41
End: 2021-01-19

## 2021-01-26 ENCOUNTER — OFFICE VISIT - HEALTHEAST (OUTPATIENT)
Dept: NEUROSURGERY | Facility: CLINIC | Age: 41
End: 2021-01-26

## 2021-01-26 DIAGNOSIS — G56.03 BILATERAL CARPAL TUNNEL SYNDROME: ICD-10-CM

## 2021-01-26 ASSESSMENT — MIFFLIN-ST. JEOR: SCORE: 1462.5

## 2021-01-29 ENCOUNTER — SURGERY - HEALTHEAST (OUTPATIENT)
Dept: NEUROSURGERY | Facility: CLINIC | Age: 41
End: 2021-01-29

## 2021-01-29 DIAGNOSIS — G56.02 CARPAL TUNNEL SYNDROME OF LEFT WRIST: ICD-10-CM

## 2021-01-31 ENCOUNTER — AMBULATORY - HEALTHEAST (OUTPATIENT)
Dept: SURGERY | Facility: HOSPITAL | Age: 41
End: 2021-01-31

## 2021-01-31 DIAGNOSIS — Z11.59 ENCOUNTER FOR SCREENING FOR OTHER VIRAL DISEASES: ICD-10-CM

## 2021-02-08 ENCOUNTER — RECORDS - HEALTHEAST (OUTPATIENT)
Dept: ADMINISTRATIVE | Facility: OTHER | Age: 41
End: 2021-02-08

## 2021-02-09 ENCOUNTER — AMBULATORY - HEALTHEAST (OUTPATIENT)
Dept: NEUROSURGERY | Facility: CLINIC | Age: 41
End: 2021-02-09

## 2021-02-09 DIAGNOSIS — Z01.818 PRE-OP TESTING: ICD-10-CM

## 2021-02-11 ENCOUNTER — COMMUNICATION - HEALTHEAST (OUTPATIENT)
Dept: NEUROSURGERY | Facility: CLINIC | Age: 41
End: 2021-02-11

## 2021-02-11 ENCOUNTER — AMBULATORY - HEALTHEAST (OUTPATIENT)
Dept: LAB | Facility: CLINIC | Age: 41
End: 2021-02-11

## 2021-02-19 ENCOUNTER — AMBULATORY - HEALTHEAST (OUTPATIENT)
Dept: OTHER | Facility: CLINIC | Age: 41
End: 2021-02-19

## 2021-02-19 ENCOUNTER — OFFICE VISIT - HEALTHEAST (OUTPATIENT)
Dept: FAMILY MEDICINE | Facility: CLINIC | Age: 41
End: 2021-02-19

## 2021-02-19 ENCOUNTER — DOCUMENTATION ONLY (OUTPATIENT)
Dept: OTHER | Facility: CLINIC | Age: 41
End: 2021-02-19

## 2021-02-19 DIAGNOSIS — Z01.818 PREOP EXAMINATION: ICD-10-CM

## 2021-02-19 DIAGNOSIS — Z01.818 PRE-OP TESTING: ICD-10-CM

## 2021-02-19 DIAGNOSIS — G56.02 CARPAL TUNNEL SYNDROME OF LEFT WRIST: ICD-10-CM

## 2021-02-19 DIAGNOSIS — Z11.59 ENCOUNTER FOR SCREENING FOR OTHER VIRAL DISEASES: ICD-10-CM

## 2021-02-19 DIAGNOSIS — F32.1 MODERATE MAJOR DEPRESSION (H): ICD-10-CM

## 2021-02-19 LAB
ANION GAP SERPL CALCULATED.3IONS-SCNC: 7 MMOL/L (ref 5–18)
APTT PPP: 28 SECONDS (ref 24–37)
BUN SERPL-MCNC: 15 MG/DL (ref 8–22)
CALCIUM SERPL-MCNC: 9.2 MG/DL (ref 8.5–10.5)
CHLORIDE BLD-SCNC: 105 MMOL/L (ref 98–107)
CLOSURE TME COLL+EPINEP BLD: 109 SEC (ref 1–180)
CO2 SERPL-SCNC: 27 MMOL/L (ref 22–31)
CREAT SERPL-MCNC: 0.77 MG/DL (ref 0.6–1.1)
ERYTHROCYTE [DISTWIDTH] IN BLOOD BY AUTOMATED COUNT: 12 % (ref 11–14.5)
GFR SERPL CREATININE-BSD FRML MDRD: >60 ML/MIN/1.73M2
GLUCOSE BLD-MCNC: 87 MG/DL (ref 70–125)
HCT VFR BLD AUTO: 40.4 % (ref 35–47)
HGB BLD-MCNC: 13.6 G/DL (ref 12–16)
INR PPP: 0.99 (ref 0.9–1.1)
MCH RBC QN AUTO: 31.9 PG (ref 27–34)
MCHC RBC AUTO-ENTMCNC: 33.7 G/DL (ref 32–36)
MCV RBC AUTO: 95 FL (ref 80–100)
PLATELET # BLD AUTO: 251 THOU/UL (ref 140–440)
PMV BLD AUTO: 9.4 FL (ref 7–10)
POTASSIUM BLD-SCNC: 4.2 MMOL/L (ref 3.5–5)
RBC # BLD AUTO: 4.26 MILL/UL (ref 3.8–5.4)
SODIUM SERPL-SCNC: 139 MMOL/L (ref 136–145)
WBC: 4.4 THOU/UL (ref 4–11)

## 2021-02-19 ASSESSMENT — ANXIETY QUESTIONNAIRES
GAD7 TOTAL SCORE: 7
2. NOT BEING ABLE TO STOP OR CONTROL WORRYING: SEVERAL DAYS
1. FEELING NERVOUS, ANXIOUS, OR ON EDGE: SEVERAL DAYS
7. FEELING AFRAID AS IF SOMETHING AWFUL MIGHT HAPPEN: SEVERAL DAYS
4. TROUBLE RELAXING: SEVERAL DAYS
5. BEING SO RESTLESS THAT IT IS HARD TO SIT STILL: SEVERAL DAYS
6. BECOMING EASILY ANNOYED OR IRRITABLE: SEVERAL DAYS
IF YOU CHECKED OFF ANY PROBLEMS ON THIS QUESTIONNAIRE, HOW DIFFICULT HAVE THESE PROBLEMS MADE IT FOR YOU TO DO YOUR WORK, TAKE CARE OF THINGS AT HOME, OR GET ALONG WITH OTHER PEOPLE: NOT DIFFICULT AT ALL
3. WORRYING TOO MUCH ABOUT DIFFERENT THINGS: SEVERAL DAYS

## 2021-02-19 ASSESSMENT — PATIENT HEALTH QUESTIONNAIRE - PHQ9: SUM OF ALL RESPONSES TO PHQ QUESTIONS 1-9: 3

## 2021-02-22 ENCOUNTER — AMBULATORY - HEALTHEAST (OUTPATIENT)
Dept: LAB | Facility: CLINIC | Age: 41
End: 2021-02-22

## 2021-02-22 DIAGNOSIS — Z01.818 PREOP EXAMINATION: ICD-10-CM

## 2021-02-23 ENCOUNTER — RECORDS - HEALTHEAST (OUTPATIENT)
Dept: ADMINISTRATIVE | Facility: OTHER | Age: 41
End: 2021-02-23

## 2021-02-23 ENCOUNTER — OFFICE VISIT (OUTPATIENT)
Dept: NEUROLOGY | Facility: CLINIC | Age: 41
End: 2021-02-23
Payer: COMMERCIAL

## 2021-02-23 VITALS
DIASTOLIC BLOOD PRESSURE: 79 MMHG | WEIGHT: 176 LBS | SYSTOLIC BLOOD PRESSURE: 122 MMHG | HEIGHT: 66 IN | BODY MASS INDEX: 28.28 KG/M2 | HEART RATE: 66 BPM

## 2021-02-23 DIAGNOSIS — G43.809 OTHER MIGRAINE WITHOUT STATUS MIGRAINOSUS, NOT INTRACTABLE: Primary | ICD-10-CM

## 2021-02-23 DIAGNOSIS — R25.3 MUSCLE TWITCHING: ICD-10-CM

## 2021-02-23 DIAGNOSIS — F43.9 STRESS: ICD-10-CM

## 2021-02-23 DIAGNOSIS — R74.8 ELEVATED CK: ICD-10-CM

## 2021-02-23 LAB
SARS-COV-2 PCR COMMENT: NORMAL
SARS-COV-2 RNA SPEC QL NAA+PROBE: NEGATIVE
SARS-COV-2 VIRUS SPECIMEN SOURCE: NORMAL

## 2021-02-23 PROCEDURE — 99214 OFFICE O/P EST MOD 30 MIN: CPT | Performed by: PSYCHIATRY & NEUROLOGY

## 2021-02-23 ASSESSMENT — MIFFLIN-ST. JEOR: SCORE: 1485.08

## 2021-02-23 NOTE — LETTER
2/23/2021         RE: Larissa Elliott  [address] [place] [address]        Dear Colleague,    Thank you for referring your patient, Larissa Elliott, to the [facility] [place]. Please see a copy of my visit note below.    NEUROLOGY OUTPATIENT PROGRESS NOTE  Feb 23, 2021     CHIEF COMPLAINT/REASON FOR VISIT/REASON FOR CONSULT  Patient presents with:  Follow Up  Chronic Migraine  Muscle twitching    REASON FOR CONSULTATION- Headaches      HISTORY OF PRESENT ILLNESS  Larissa Elliott is a 40 year old female seen for evaluation of headaches. Headache started when she was younger in college. Headaches were once every week. Headaches are more on the left side throbbing. There was photophobia and photophobia. She does feel nauseous with the headaches. Maxalt caused side effects as result she was switched to Imitrex. She has found walnuts to be a trigger for headaches. Birth control in the placebo week has also been thought to be causing her headaches. She was started on Topamax 25 mg and the headaches have decreased in frequency. Since last visit headaches have been under good control except in the last week where she had continuous multiple headaches. She is not sure what made the headaches worse possibly with changes in season. Imitrex does work as abortive therapy. She is using Excedrin Migraine for milder headaches. There is still around 2 a month except for the rare flareup like last week.    She previously complained of pain in the left leg. She does have a history of L5-S1 microdiscectomy. Pain is more of a cramping/twitching sensation in the thigh and the foot of the left side. MRI lumbar spine did not show any significant lesions that would explain this. It did show previous surgical changes. EMG was negative as well. Blood work was checked for muscle disease and her CK was elevated but no clear cause for it was identified. Repeat CK was also elevated. More recent CK  checked by her primary care doctor came back normal.    Since last time patient had worsening pain on the right side. She was identified to have a disc herniation and needed urgent surgery. She is about 6 weeks postop. Her strength is coming back and her balance is improving. Denies any other neurological issues. Patient denies any triggers that might have caused the disc to herniate.    11/28/20  Patient returns today.  Headaches have slightly worsened.  She is having 5-6 headache days a month.  Headaches are unchanged in nature.  Generally the headaches would be once a week.  She does have vision issues with the headache.  Imitrex does work as abortive therapy.  Patient will have shows if she is too late in taking the medication.  She also been put on antianxiety medication Klonopin recently.  She is going through her divorce which is causing her distress and making headaches worse.  Reports no other triggers.  Headaches unchanged in nature.  Remains on Topamax has issues with taste but no other side effects.    Patient continues to have pain in the right leg.  This more in the back and the hip.  She is planning on going and seeing her orthopedic surgeon again.    Patient denies any muscle pain or muscle twitching.  This was stopped.  CK was still elevated on repeat check.    2/23/21  Patient returns today.  1.  In terms of the headaches these are happening about 3-4 times a month.  Reports no changes in the nature of the headache.  States on Topamax.  Does complain of some taste issues especially when drinking soda.  Denies any other new neurological issues.  Imitrex does work as abortive therapy.  2.  Patient reports no ongoing muscle twitching.  Denies any other weakness or other symptoms.  Previously CK was elevated.  3.  Patient reports that she has been diagnosed with carpal tunnel in both upper extremities.  Has surgery planned for left upper extremity.  She reports no repetitive activity and thinks it is  related to pregnancy that happened several years ago.  4.  In terms of her right leg pain she was seen by the surgeon.  She was found to have another rerupture of her lumbar disc.  She is supposed to get repeat surgery done but wants to try doing more conservative measures during the wintertime and will consider surgery during the summertime    Previous history is reviewed and this is unchanged.    PAST MEDICAL/SURGICAL HISTORY  Past Medical History:   Diagnosis Date     Migraines      Patient Active Problem List   Diagnosis     Chronic migraine     Elevated creatine kinase level     Foraminal stenosis of lumbar region     Muscle twitch     PCOS (polycystic ovarian syndrome)     Symptomatic varicose veins, bilateral   Significant for migraine headaches. L5-S1 microdiscectomy surgery      FAMILY HISTORY  Family History   Problem Relation Age of Onset     Migraines Father      Migraines Brother      Seizure Disorder Brother        SOCIAL HISTORY  Social History     Tobacco Use     Smoking status: Never Smoker     Smokeless tobacco: Never Used   Substance Use Topics     Alcohol use: Yes     Comment: Special occasions only     Drug use: Never       SYSTEMS REVIEW  Twelve-system ROS was done and other than the HPI this was negative.     MEDICATIONS  acetaminophen (TYLENOL) 500 MG tablet, Take 500 mg by mouth  cholecalciferol 25 MCG (1000 UT) TABS, Take 2,000 Units by mouth  clonazePAM (KLONOPIN) 0.5 MG tablet, Take 0.5 mg by mouth as needed  escitalopram (LEXAPRO) 10 MG tablet, Take 10 mg by mouth  hydrOXYzine (VISTARIL) 25 MG capsule,   Multiple Vitamins-Minerals (MULTIVITAMIN ADULT PO),   ondansetron (ZOFRAN) 8 MG tablet,   oxyCODONE (ROXICODONE) 5 MG tablet,   spironolactone (ALDACTONE) 50 MG tablet, Take 50 mg by mouth  SUMAtriptan (IMITREX) 50 MG tablet, Take 1 tablet (50 mg) by mouth at onset of headache for migraine May repeat in 2 hours. Max 2 tablets/24 hours.  topiramate (TOPAMAX) 25 MG tablet, Take 25 mg by  "mouth daily     No current facility-administered medications on file prior to visit.        PHYSICAL EXAMINATION  VITALS: /79   Pulse 66   Ht 1.676 m (5' 6\")   Wt 79.8 kg (176 lb)   BMI 28.41 kg/m    GENERAL: Healthy appearing, alert, no acute distress, normal habitus.  CARDIOVASCULAR: Extremities warm and well-perfused.  NEUROLOGICAL:  Patient is awake and oriented to self, place and time.  Attention span is normal.  Memory is grossly intact.  Language is fluent and follows commands appropriately.  Appropriate fund of knowledge. Cranial nerves 2-12 are intact. There is no pronator drift.  Motor exam shows 5/5 strength in all extremities.  Tone is symmetric bilaterally in upper and lower extremities.  Reflexes are symmetric and 2+ in upper extremities and lower extremities.  Finger to nose is without dysmetria.    Gait is normal and the patient is able to do tandem walk .         DIAGNOSTICS  MRI L spine  1.  Interval postoperative changes left discectomy at L4-L5 with   resolution of previously present left paracentral disc protrusion. Mild   right lateral recess narrowing, similar to prior. Overall mild canal   narrowing. Mild to moderate bilateral foraminal   narrowing, similar to prior.  2.  Interval resolution of left paracentral disc protrusion/extrusion at   L5-S1. No significant canal or foraminal narrowing.  3.  Tiny central disc protrusion with annular tear at L3-L4. Mild   bilateral lateral recess narrowing. Overall mild canal narrowing. No   significant change.  4.  Normal distal cord.      EMG  CLINICAL INTERPRETATION:  This is a normal nerve conduction and EMG study except absent left superficial peroneal sensory SNAP which could be artifactual or represent early sensory neuropathy. Further clinical correlation is needed.     MRI brain  MPRESSION:   1.  Normal head MRI.   2.  No acute/subacute infarction, intracranial hemorrhage, mass, mass   effect, or hydrocephalus.     RELEVANT LABS  CK " 112.  Component      Latest Ref Rng & Units 12/5/2019 1/22/2020 2/26/2020 11/24/2020   CK, Total      30 - 190 U/L 256 (H) 398 (H) 121 454 (H)     Component Latest Ref Rng & Units 1/9/2019 7/19/2019   Sodium 136 - 145 mmol/L 140   Potassium 3.5 - 5.0 mmol/L 4.6   Chloride 98 - 107 mmol/L 107   CO2 22 - 31 mmol/L 21 (L)   Anion Gap, Calculation 5 - 18 mmol/L 12   Glucose 70 - 125 mg/dL 79   BUN 8 - 22 mg/dL 9   Creatinine 0.60 - 1.10 mg/dL 0.84   GFR MDRD Af Amer >60 mL/min/1.73m2 >60   GFR MDRD Non Af Amer >60 mL/min/1.73m2 >60   Bilirubin, Total 0.0 - 1.0 mg/dL 0.6   Calcium 8.5 - 10.5 mg/dL 9.4   Protein, Total 6.0 - 8.0 g/dL 6.7   ALBUMIN 3.5 - 5.0 g/dL 4.0   Alkaline Phosphatase 45 - 120 U/L 40 (L)   AST 0 - 40 U/L 44 (H)   ALT 0 - 45 U/L 39   Cholesterol <=199 mg/dL 205 (H)   Triglycerides <=149 mg/dL 101   HDL Cholesterol >=50 mg/dL 52   LDL Calculated <=129 mg/dL 133 (H)   Patient Fasting > 8hrs? Yes   Hemoglobin A1c 3.5 - 6.0 % 5.4   TSH 0.30 - 5.00 uIU/mL 1.49     IMPRESSION/REPORT/PLAN  Other migraine without status migrainosus, not intractable  Elevated CK  Muscle twitching  Stress  Lumbar foraminal stenosis    This is a 40 year old female episodic migraines.  They have worsened due to stress.  Encouraged her to deal with the stress little bit better.  We will continue the Topamax at 25 mg every night.  Monitor for side effects.  Continue Imitrex for abortive therapy.    In terms of the elevated CK no clear cause has been identified.  Intermittently it has been normal.  I suspect this is normal for the patient and not pathological.  Could consider muscle biopsy the for right now would hold off.  Muscle fasciculations have further improved which is reassuring.    She plans to get surgery done for carpal tunnel-left wrist.  Agree with conservative management of this condition/rupture.  Will defer further management to the surgeon    -     SUMAtriptan (IMITREX) 50 MG tablet; Take 1 tablet (50 mg) by mouth at  onset of headache for migraine May repeat in 2 hours. Max 2 tablets/24 hours.  - Topamax 25 mg at bedtime.     Return in about 6 months (around 8/23/2021) for Virtual or clinic.     Over 30 minutes were spent coordinating the care for the patient on the day of the encounter.  This includes previsit, during visit and post visit activities as documented above.  (Activities include but not inclusive of reviewing chart, reviewing outside records, reviewing labs and imaging study results as well as the images, patient visit time including getting history and exam,  use if applicable, review of test results with the patient and coming up with a plan in a shared model, counseling patient and family, education and answering patient questions, EMR , EMR diagnosis entry and problem list management, medication reconciliation and prescription management if applicable, paperwork if applicable, printing documents and documentation of the visit activities.)      Rj Lopes MD  Neurologist  [facility] Neurology [facility]  Tel:- [phone]    This note was dictated using voice recognition software.  Any grammatical or context distortions are unintentional and inherent to the software.        Again, thank you for allowing me to participate in the care of your patient.        Sincerely,        Rj Lopes MD

## 2021-02-23 NOTE — PROGRESS NOTES
NEUROLOGY OUTPATIENT PROGRESS NOTE  Feb 23, 2021     CHIEF COMPLAINT/REASON FOR VISIT/REASON FOR CONSULT  Patient presents with:  Follow Up  Chronic Migraine  Muscle twitching    REASON FOR CONSULTATION- Headaches      HISTORY OF PRESENT ILLNESS  Larissa Elliott is a 40 year old female seen for evaluation of headaches. Headache started when she was younger in college. Headaches were once every week. Headaches are more on the left side throbbing. There was photophobia and photophobia. She does feel nauseous with the headaches. Maxalt caused side effects as result she was switched to Imitrex. She has found walnuts to be a trigger for headaches. Birth control in the placebo week has also been thought to be causing her headaches. She was started on Topamax 25 mg and the headaches have decreased in frequency. Since last visit headaches have been under good control except in the last week where she had continuous multiple headaches. She is not sure what made the headaches worse possibly with changes in season. Imitrex does work as abortive therapy. She is using Excedrin Migraine for milder headaches. There is still around 2 a month except for the rare flareup like last week.    She previously complained of pain in the left leg. She does have a history of L5-S1 microdiscectomy. Pain is more of a cramping/twitching sensation in the thigh and the foot of the left side. MRI lumbar spine did not show any significant lesions that would explain this. It did show previous surgical changes. EMG was negative as well. Blood work was checked for muscle disease and her CK was elevated but no clear cause for it was identified. Repeat CK was also elevated. More recent CK checked by her primary care doctor came back normal.    Since last time patient had worsening pain on the right side. She was identified to have a disc herniation and needed urgent surgery. She is about 6 weeks postop. Her strength is coming back and her balance is  improving. Denies any other neurological issues. Patient denies any triggers that might have caused the disc to herniate.    11/28/20  Patient returns today.  Headaches have slightly worsened.  She is having 5-6 headache days a month.  Headaches are unchanged in nature.  Generally the headaches would be once a week.  She does have vision issues with the headache.  Imitrex does work as abortive therapy.  Patient will have shows if she is too late in taking the medication.  She also been put on antianxiety medication Klonopin recently.  She is going through her divorce which is causing her distress and making headaches worse.  Reports no other triggers.  Headaches unchanged in nature.  Remains on Topamax has issues with taste but no other side effects.    Patient continues to have pain in the right leg.  This more in the back and the hip.  She is planning on going and seeing her orthopedic surgeon again.    Patient denies any muscle pain or muscle twitching.  This was stopped.  CK was still elevated on repeat check.    2/23/21  Patient returns today.  1.  In terms of the headaches these are happening about 3-4 times a month.  Reports no changes in the nature of the headache.  States on Topamax.  Does complain of some taste issues especially when drinking soda.  Denies any other new neurological issues.  Imitrex does work as abortive therapy.  2.  Patient reports no ongoing muscle twitching.  Denies any other weakness or other symptoms.  Previously CK was elevated.  3.  Patient reports that she has been diagnosed with carpal tunnel in both upper extremities.  Has surgery planned for left upper extremity.  She reports no repetitive activity and thinks it is related to pregnancy that happened several years ago.  4.  In terms of her right leg pain she was seen by the surgeon.  She was found to have another rerupture of her lumbar disc.  She is supposed to get repeat surgery done but wants to try doing more conservative  "measures during the wintertime and will consider surgery during the summertime    Previous history is reviewed and this is unchanged.    PAST MEDICAL/SURGICAL HISTORY  Past Medical History:   Diagnosis Date     Migraines      Patient Active Problem List   Diagnosis     Chronic migraine     Elevated creatine kinase level     Foraminal stenosis of lumbar region     Muscle twitch     PCOS (polycystic ovarian syndrome)     Symptomatic varicose veins, bilateral   Significant for migraine headaches. L5-S1 microdiscectomy surgery      FAMILY HISTORY  Family History   Problem Relation Age of Onset     Migraines Father      Migraines Brother      Seizure Disorder Brother        SOCIAL HISTORY  Social History     Tobacco Use     Smoking status: Never Smoker     Smokeless tobacco: Never Used   Substance Use Topics     Alcohol use: Yes     Comment: Special occasions only     Drug use: Never       SYSTEMS REVIEW  Twelve-system ROS was done and other than the HPI this was negative.     MEDICATIONS  acetaminophen (TYLENOL) 500 MG tablet, Take 500 mg by mouth  cholecalciferol 25 MCG (1000 UT) TABS, Take 2,000 Units by mouth  clonazePAM (KLONOPIN) 0.5 MG tablet, Take 0.5 mg by mouth as needed  escitalopram (LEXAPRO) 10 MG tablet, Take 10 mg by mouth  hydrOXYzine (VISTARIL) 25 MG capsule,   Multiple Vitamins-Minerals (MULTIVITAMIN ADULT PO),   ondansetron (ZOFRAN) 8 MG tablet,   oxyCODONE (ROXICODONE) 5 MG tablet,   spironolactone (ALDACTONE) 50 MG tablet, Take 50 mg by mouth  SUMAtriptan (IMITREX) 50 MG tablet, Take 1 tablet (50 mg) by mouth at onset of headache for migraine May repeat in 2 hours. Max 2 tablets/24 hours.  topiramate (TOPAMAX) 25 MG tablet, Take 25 mg by mouth daily     No current facility-administered medications on file prior to visit.        PHYSICAL EXAMINATION  VITALS: /79   Pulse 66   Ht 1.676 m (5' 6\")   Wt 79.8 kg (176 lb)   BMI 28.41 kg/m    GENERAL: Healthy appearing, alert, no acute distress, " normal habitus.  CARDIOVASCULAR: Extremities warm and well-perfused.  NEUROLOGICAL:  Patient is awake and oriented to self, place and time.  Attention span is normal.  Memory is grossly intact.  Language is fluent and follows commands appropriately.  Appropriate fund of knowledge. Cranial nerves 2-12 are intact. There is no pronator drift.  Motor exam shows 5/5 strength in all extremities.  Tone is symmetric bilaterally in upper and lower extremities.  Reflexes are symmetric and 2+ in upper extremities and lower extremities.  Finger to nose is without dysmetria.    Gait is normal and the patient is able to do tandem walk .         DIAGNOSTICS  MRI L spine  1.  Interval postoperative changes left discectomy at L4-L5 with   resolution of previously present left paracentral disc protrusion. Mild   right lateral recess narrowing, similar to prior. Overall mild canal   narrowing. Mild to moderate bilateral foraminal   narrowing, similar to prior.  2.  Interval resolution of left paracentral disc protrusion/extrusion at   L5-S1. No significant canal or foraminal narrowing.  3.  Tiny central disc protrusion with annular tear at L3-L4. Mild   bilateral lateral recess narrowing. Overall mild canal narrowing. No   significant change.  4.  Normal distal cord.      EMG  CLINICAL INTERPRETATION:  This is a normal nerve conduction and EMG study except absent left superficial peroneal sensory SNAP which could be artifactual or represent early sensory neuropathy. Further clinical correlation is needed.     MRI brain  MPRESSION:   1.  Normal head MRI.   2.  No acute/subacute infarction, intracranial hemorrhage, mass, mass   effect, or hydrocephalus.     RELEVANT LABS  .  Component      Latest Ref Rng & Units 12/5/2019 1/22/2020 2/26/2020 11/24/2020   CK, Total      30 - 190 U/L 256 (H) 398 (H) 121 454 (H)     Component Latest Ref Rng & Units 1/9/2019 7/19/2019   Sodium 136 - 145 mmol/L 140   Potassium 3.5 - 5.0 mmol/L 4.6    Chloride 98 - 107 mmol/L 107   CO2 22 - 31 mmol/L 21 (L)   Anion Gap, Calculation 5 - 18 mmol/L 12   Glucose 70 - 125 mg/dL 79   BUN 8 - 22 mg/dL 9   Creatinine 0.60 - 1.10 mg/dL 0.84   GFR MDRD Af Amer >60 mL/min/1.73m2 >60   GFR MDRD Non Af Amer >60 mL/min/1.73m2 >60   Bilirubin, Total 0.0 - 1.0 mg/dL 0.6   Calcium 8.5 - 10.5 mg/dL 9.4   Protein, Total 6.0 - 8.0 g/dL 6.7   ALBUMIN 3.5 - 5.0 g/dL 4.0   Alkaline Phosphatase 45 - 120 U/L 40 (L)   AST 0 - 40 U/L 44 (H)   ALT 0 - 45 U/L 39   Cholesterol <=199 mg/dL 205 (H)   Triglycerides <=149 mg/dL 101   HDL Cholesterol >=50 mg/dL 52   LDL Calculated <=129 mg/dL 133 (H)   Patient Fasting > 8hrs? Yes   Hemoglobin A1c 3.5 - 6.0 % 5.4   TSH 0.30 - 5.00 uIU/mL 1.49     IMPRESSION/REPORT/PLAN  Other migraine without status migrainosus, not intractable  Elevated CK  Muscle twitching  Stress  Lumbar foraminal stenosis    This is a 40 year old female episodic migraines.  They have worsened due to stress.  Encouraged her to deal with the stress little bit better.  We will continue the Topamax at 25 mg every night.  Monitor for side effects.  Continue Imitrex for abortive therapy.    In terms of the elevated CK no clear cause has been identified.  Intermittently it has been normal.  I suspect this is normal for the patient and not pathological.  Could consider muscle biopsy the for right now would hold off.  Muscle fasciculations have further improved which is reassuring.    She plans to get surgery done for carpal tunnel-left wrist.  Agree with conservative management of this condition/rupture.  Will defer further management to the surgeon    -     SUMAtriptan (IMITREX) 50 MG tablet; Take 1 tablet (50 mg) by mouth at onset of headache for migraine May repeat in 2 hours. Max 2 tablets/24 hours.  - Topamax 25 mg at bedtime.     Return in about 6 months (around 8/23/2021) for Virtual or clinic.     Over 30 minutes were spent coordinating the care for the patient on the day of  the encounter.  This includes previsit, during visit and post visit activities as documented above.  (Activities include but not inclusive of reviewing chart, reviewing outside records, reviewing labs and imaging study results as well as the images, patient visit time including getting history and exam,  use if applicable, review of test results with the patient and coming up with a plan in a shared model, counseling patient and family, education and answering patient questions, EMR , EMR diagnosis entry and problem list management, medication reconciliation and prescription management if applicable, paperwork if applicable, printing documents and documentation of the visit activities.)      Rj Lopes MD  Neurologist  [facility] Neurology [facility]  Tel:- [phone]    This note was dictated using voice recognition software.  Any grammatical or context distortions are unintentional and inherent to the software.

## 2021-02-24 ENCOUNTER — COMMUNICATION - HEALTHEAST (OUTPATIENT)
Dept: SCHEDULING | Facility: CLINIC | Age: 41
End: 2021-02-24

## 2021-02-24 ENCOUNTER — COMMUNICATION - HEALTHEAST (OUTPATIENT)
Dept: FAMILY MEDICINE | Facility: CLINIC | Age: 41
End: 2021-02-24

## 2021-02-24 DIAGNOSIS — F32.1 CURRENT MODERATE EPISODE OF MAJOR DEPRESSIVE DISORDER WITHOUT PRIOR EPISODE (H): ICD-10-CM

## 2021-02-25 ENCOUNTER — ANESTHESIA - HEALTHEAST (OUTPATIENT)
Dept: SURGERY | Facility: HOSPITAL | Age: 41
End: 2021-02-25

## 2021-02-25 ENCOUNTER — COMMUNICATION - HEALTHEAST (OUTPATIENT)
Dept: NEUROSURGERY | Facility: CLINIC | Age: 41
End: 2021-02-25

## 2021-02-26 ENCOUNTER — COMMUNICATION - HEALTHEAST (OUTPATIENT)
Dept: NEUROSURGERY | Facility: CLINIC | Age: 41
End: 2021-02-26

## 2021-02-26 ENCOUNTER — SURGERY - HEALTHEAST (OUTPATIENT)
Dept: SURGERY | Facility: HOSPITAL | Age: 41
End: 2021-02-26

## 2021-02-26 ASSESSMENT — MIFFLIN-ST. JEOR: SCORE: 1480.08

## 2021-03-07 ENCOUNTER — COMMUNICATION - HEALTHEAST (OUTPATIENT)
Dept: FAMILY MEDICINE | Facility: CLINIC | Age: 41
End: 2021-03-07

## 2021-03-07 DIAGNOSIS — E28.2 PCOS (POLYCYSTIC OVARIAN SYNDROME): ICD-10-CM

## 2021-03-10 ENCOUNTER — AMBULATORY - HEALTHEAST (OUTPATIENT)
Dept: NEUROSURGERY | Facility: CLINIC | Age: 41
End: 2021-03-10

## 2021-03-10 DIAGNOSIS — Z98.890 S/P CARPAL TUNNEL RELEASE: ICD-10-CM

## 2021-03-23 ENCOUNTER — RECORDS - HEALTHEAST (OUTPATIENT)
Dept: ADMINISTRATIVE | Facility: OTHER | Age: 41
End: 2021-03-23

## 2021-03-26 ENCOUNTER — RECORDS - HEALTHEAST (OUTPATIENT)
Dept: ADMINISTRATIVE | Facility: OTHER | Age: 41
End: 2021-03-26

## 2021-03-27 ENCOUNTER — AMBULATORY - HEALTHEAST (OUTPATIENT)
Dept: SURGERY | Facility: CLINIC | Age: 41
End: 2021-03-27

## 2021-03-27 DIAGNOSIS — Z11.59 ENCOUNTER FOR SCREENING FOR OTHER VIRAL DISEASES: ICD-10-CM

## 2021-04-13 ENCOUNTER — OFFICE VISIT - HEALTHEAST (OUTPATIENT)
Dept: NEUROSURGERY | Facility: CLINIC | Age: 41
End: 2021-04-13

## 2021-04-13 DIAGNOSIS — G56.02 CARPAL TUNNEL SYNDROME OF LEFT WRIST: ICD-10-CM

## 2021-04-13 ASSESSMENT — MIFFLIN-ST. JEOR: SCORE: 1480.08

## 2021-04-20 ENCOUNTER — OFFICE VISIT - HEALTHEAST (OUTPATIENT)
Dept: FAMILY MEDICINE | Facility: CLINIC | Age: 41
End: 2021-04-20

## 2021-04-20 ENCOUNTER — RECORDS - HEALTHEAST (OUTPATIENT)
Dept: ADMINISTRATIVE | Facility: OTHER | Age: 41
End: 2021-04-20

## 2021-04-20 DIAGNOSIS — Z01.818 PREOP EXAMINATION: ICD-10-CM

## 2021-04-20 DIAGNOSIS — L70.0 ACNE VULGARIS: ICD-10-CM

## 2021-04-20 DIAGNOSIS — M48.061 FORAMINAL STENOSIS OF LUMBAR REGION: ICD-10-CM

## 2021-04-20 DIAGNOSIS — Z11.59 ENCOUNTER FOR SCREENING FOR OTHER VIRAL DISEASES: ICD-10-CM

## 2021-04-20 DIAGNOSIS — F32.1 MODERATE MAJOR DEPRESSION (H): ICD-10-CM

## 2021-04-20 DIAGNOSIS — M54.16 LUMBAR RADICULOPATHY: ICD-10-CM

## 2021-04-20 LAB
ANION GAP SERPL CALCULATED.3IONS-SCNC: 8 MMOL/L (ref 5–18)
BASOPHILS # BLD AUTO: 0 THOU/UL (ref 0–0.2)
BASOPHILS NFR BLD AUTO: 1 % (ref 0–2)
BUN SERPL-MCNC: 11 MG/DL (ref 8–22)
CALCIUM SERPL-MCNC: 9.5 MG/DL (ref 8.5–10.5)
CHLORIDE BLD-SCNC: 107 MMOL/L (ref 98–107)
CO2 SERPL-SCNC: 24 MMOL/L (ref 22–31)
CREAT SERPL-MCNC: 0.86 MG/DL (ref 0.6–1.1)
EOSINOPHIL # BLD AUTO: 0.1 THOU/UL (ref 0–0.4)
EOSINOPHIL NFR BLD AUTO: 3 % (ref 0–6)
ERYTHROCYTE [DISTWIDTH] IN BLOOD BY AUTOMATED COUNT: 11.8 % (ref 11–14.5)
GFR SERPL CREATININE-BSD FRML MDRD: >60 ML/MIN/1.73M2
GLUCOSE BLD-MCNC: 83 MG/DL (ref 70–125)
HCT VFR BLD AUTO: 39.3 % (ref 35–47)
HGB BLD-MCNC: 13.4 G/DL (ref 12–16)
IMM GRANULOCYTES # BLD: 0 THOU/UL
IMM GRANULOCYTES NFR BLD: 0 %
LYMPHOCYTES # BLD AUTO: 1.2 THOU/UL (ref 0.8–4.4)
LYMPHOCYTES NFR BLD AUTO: 24 % (ref 20–40)
MCH RBC QN AUTO: 31.5 PG (ref 27–34)
MCHC RBC AUTO-ENTMCNC: 34.1 G/DL (ref 32–36)
MCV RBC AUTO: 93 FL (ref 80–100)
MONOCYTES # BLD AUTO: 0.5 THOU/UL (ref 0–0.9)
MONOCYTES NFR BLD AUTO: 9 % (ref 2–10)
NEUTROPHILS # BLD AUTO: 3.3 THOU/UL (ref 2–7.7)
NEUTROPHILS NFR BLD AUTO: 64 % (ref 50–70)
PLATELET # BLD AUTO: 261 THOU/UL (ref 140–440)
PMV BLD AUTO: 9.9 FL (ref 7–10)
POTASSIUM BLD-SCNC: 4.6 MMOL/L (ref 3.5–5)
RBC # BLD AUTO: 4.25 MILL/UL (ref 3.8–5.4)
SODIUM SERPL-SCNC: 139 MMOL/L (ref 136–145)
WBC: 5.2 THOU/UL (ref 4–11)

## 2021-04-20 ASSESSMENT — MIFFLIN-ST. JEOR
SCORE: 1472.5
SCORE: 1459.67

## 2021-04-21 ENCOUNTER — COMMUNICATION - HEALTHEAST (OUTPATIENT)
Dept: INFUSION THERAPY | Age: 41
End: 2021-04-21

## 2021-04-21 ENCOUNTER — COMMUNICATION - HEALTHEAST (OUTPATIENT)
Dept: SCHEDULING | Facility: CLINIC | Age: 41
End: 2021-04-21

## 2021-04-21 ENCOUNTER — RECORDS - HEALTHEAST (OUTPATIENT)
Dept: ADMINISTRATIVE | Facility: OTHER | Age: 41
End: 2021-04-21

## 2021-04-21 ENCOUNTER — AMBULATORY - HEALTHEAST (OUTPATIENT)
Dept: SURGERY | Facility: CLINIC | Age: 41
End: 2021-04-21

## 2021-04-21 LAB
SARS-COV-2 PCR COMMENT: ABNORMAL
SARS-COV-2 RNA SPEC QL NAA+PROBE: POSITIVE
SARS-COV-2 VIRUS SPECIMEN SOURCE: ABNORMAL

## 2021-04-22 DIAGNOSIS — Z00.6 RESEARCH SUBJECT: Primary | ICD-10-CM

## 2021-04-22 NOTE — PROGRESS NOTES
This is a research note indicating that this patient has been reviewed by the site PI and is eligible to participate in the COVID-OUT (MET-Covid) Study (IDS#[id number], Shiva [id number]).     Of note, this study involves the use of either metformin immediate release or placebo for 14 days after the initiation of the study. The metformin dosing is:   - 500mg on day 1;   - 500mg twice daily days 2-5;    - 500mg in the AM and 1,000mg in the PM days 6-14. (Max dose is 1,500mg per day).    A clinical team may stop the study drug at any point if they feel it should be discontinued. The patient has been advised to seek medical care as they would normally, whether they are in the study or not. This includes receiving treatments for COVID-19. They should do so as if they are in the study or not.     Per the study protocol, study medication should be stopped at the time of hospitalization. The patient may resume the study drug at the time of discharge if they want to, if they are within the 14 days, and if they clinical team thinks that being on metformin is safe at that time.      Please reach out to the study team if there are any questions. Study contact info: [email or web address]. Please call the PI (Dora Woflf), for immediate assistance: [phone]. If she does not , you may call the , Babs Zamudio: [phone].

## 2021-04-30 ENCOUNTER — COMMUNICATION - HEALTHEAST (OUTPATIENT)
Dept: ADMINISTRATIVE | Facility: CLINIC | Age: 41
End: 2021-04-30

## 2021-04-30 DIAGNOSIS — B02.23 ACUTE HERPES ZOSTER NEUROPATHY: ICD-10-CM

## 2021-05-26 ENCOUNTER — APPOINTMENT (OUTPATIENT)
Dept: URBAN - METROPOLITAN AREA CLINIC 260 | Age: 41
Setting detail: DERMATOLOGY
End: 2021-05-27

## 2021-05-26 VITALS — HEIGHT: 65.5 IN | WEIGHT: 175 LBS

## 2021-05-26 DIAGNOSIS — D22 MELANOCYTIC NEVI: ICD-10-CM

## 2021-05-26 DIAGNOSIS — L81.4 OTHER MELANIN HYPERPIGMENTATION: ICD-10-CM

## 2021-05-26 DIAGNOSIS — Z71.89 OTHER SPECIFIED COUNSELING: ICD-10-CM

## 2021-05-26 PROBLEM — D22.5 MELANOCYTIC NEVI OF TRUNK: Status: ACTIVE | Noted: 2021-05-26

## 2021-05-26 PROCEDURE — OTHER COUNSELING: OTHER

## 2021-05-26 PROCEDURE — 99203 OFFICE O/P NEW LOW 30 MIN: CPT

## 2021-05-26 ASSESSMENT — LOCATION SIMPLE DESCRIPTION DERM: LOCATION SIMPLE: UPPER BACK

## 2021-05-26 ASSESSMENT — LOCATION ZONE DERM: LOCATION ZONE: TRUNK

## 2021-05-26 ASSESSMENT — LOCATION DETAILED DESCRIPTION DERM: LOCATION DETAILED: INFERIOR THORACIC SPINE

## 2021-05-27 ENCOUNTER — OFFICE VISIT - HEALTHEAST (OUTPATIENT)
Dept: FAMILY MEDICINE | Facility: CLINIC | Age: 41
End: 2021-05-27

## 2021-05-27 DIAGNOSIS — M54.16 LUMBAR RADICULOPATHY: ICD-10-CM

## 2021-05-27 DIAGNOSIS — M48.061 FORAMINAL STENOSIS OF LUMBAR REGION: ICD-10-CM

## 2021-05-27 DIAGNOSIS — F32.0 MILD MAJOR DEPRESSION (H): ICD-10-CM

## 2021-05-27 DIAGNOSIS — Z23 NEED FOR SHINGLES VACCINE: ICD-10-CM

## 2021-05-27 DIAGNOSIS — Z01.818 PREOP EXAMINATION: ICD-10-CM

## 2021-05-27 LAB
ANION GAP SERPL CALCULATED.3IONS-SCNC: 10 MMOL/L (ref 5–18)
BUN SERPL-MCNC: 14 MG/DL (ref 8–22)
CALCIUM SERPL-MCNC: 9.6 MG/DL (ref 8.5–10.5)
CHLORIDE BLD-SCNC: 104 MMOL/L (ref 98–107)
CO2 SERPL-SCNC: 26 MMOL/L (ref 22–31)
CREAT SERPL-MCNC: 0.88 MG/DL (ref 0.6–1.1)
ERYTHROCYTE [DISTWIDTH] IN BLOOD BY AUTOMATED COUNT: 12.4 % (ref 11–14.5)
GFR SERPL CREATININE-BSD FRML MDRD: >60 ML/MIN/1.73M2
GLUCOSE BLD-MCNC: 81 MG/DL (ref 70–125)
HCT VFR BLD AUTO: 39.2 % (ref 35–47)
HGB BLD-MCNC: 13.3 G/DL (ref 12–16)
INR PPP: 1.02 (ref 0.9–1.1)
MCH RBC QN AUTO: 32.2 PG (ref 27–34)
MCHC RBC AUTO-ENTMCNC: 33.9 G/DL (ref 32–36)
MCV RBC AUTO: 95 FL (ref 80–100)
PLATELET # BLD AUTO: 249 THOU/UL (ref 140–440)
PMV BLD AUTO: 9.5 FL (ref 7–10)
POTASSIUM BLD-SCNC: 4.4 MMOL/L (ref 3.5–5)
RBC # BLD AUTO: 4.13 MILL/UL (ref 3.8–5.4)
SODIUM SERPL-SCNC: 140 MMOL/L (ref 136–145)
WBC: 4.8 THOU/UL (ref 4–11)

## 2021-05-27 ASSESSMENT — MIFFLIN-ST. JEOR: SCORE: 1491.42

## 2021-05-28 ASSESSMENT — ANXIETY QUESTIONNAIRES
GAD7 TOTAL SCORE: 20
GAD7 TOTAL SCORE: 7

## 2021-05-30 VITALS — BODY MASS INDEX: 25.77 KG/M2 | HEIGHT: 66 IN | WEIGHT: 160.31 LBS

## 2021-05-30 NOTE — PROGRESS NOTES
Chief Complaint   Patient presents with     Fall     states fell backwards and now C/O pain in Lt back shoulder and LT arm wants to make sure everything is ok, is able to move the arm, states has a dull ache down the arm         HPI:      Patient is here for left shoulder, upper back pain and left arm pain. Upper back and shoulder pain 4/10, left upper arm pain 2/10. Symptoms started after she tripped in her closet and fell backwards against a clothing rack. No head injury. No headache, neck pain, nausea, vomiting, tingling nor numbness of left arm.    ROS: Pertinent ROS noted in HPI.     Allergies   Allergen Reactions     Latex Rash       Patient Active Problem List   Diagnosis     Dyspepsia     Symptomatic varicose veins, bilateral     Dietary counseling and surveillance     PCOS (polycystic ovarian syndrome)       Family History   Problem Relation Age of Onset     Asthma Father      Sarcoidosis Father      Migraines Father      Asthma Brother      Breast cancer Maternal Grandmother         Premenopausal     COPD Paternal Grandfather      Psoriasis Mother        Social History     Socioeconomic History     Marital status:      Spouse name: Not on file     Number of children: Not on file     Years of education: Not on file     Highest education level: Not on file   Occupational History     Not on file   Social Needs     Financial resource strain: Not on file     Food insecurity:     Worry: Not on file     Inability: Not on file     Transportation needs:     Medical: Not on file     Non-medical: Not on file   Tobacco Use     Smoking status: Former Smoker     Smokeless tobacco: Never Used   Substance and Sexual Activity     Alcohol use: No     Drug use: No     Sexual activity: Yes     Partners: Male   Lifestyle     Physical activity:     Days per week: Not on file     Minutes per session: Not on file     Stress: Not on file   Relationships     Social connections:     Talks on phone: Not on file     Gets  together: Not on file     Attends Holiness service: Not on file     Active member of club or organization: Not on file     Attends meetings of clubs or organizations: Not on file     Relationship status: Not on file     Intimate partner violence:     Fear of current or ex partner: Not on file     Emotionally abused: Not on file     Physically abused: Not on file     Forced sexual activity: Not on file   Other Topics Concern     Not on file   Social History Narrative    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   CV: RRR, no M, R, G  Pulm:C TAB  MSK: a small area of about 3 cm of ecchymosis at left posterior upper ribs with moderate pain to palpation. Normal inspection of neck, shoulders, and upper extremities otherwise. Moderate paint to palpation at left posterior shoulder. Normal palpation of neck, T, L, and spines, and left upper extremity. Full ROM of shoulders and bilateral upper extremities. 5/5 strength of bilateral upper extremities.normal gait.    XRs -  No acute findings per my interpretation, discussed during visit.    Xr Ribs Left W Pa Chest    Result Date: 7/29/2019  EXAM: XR RIBS LEFT W PA CHEST LOCATION: [facility] DATE/TIME: 7/29/2019 3:11 PM INDICATION: injury and pain to left posterior upper ribs COMPARISON: None. FINDINGS: Negative chest x-ray. Left rib detailed films are negative with no fractures.    Xr Shoulder Left 2 Or More Vws    Result Date: 7/29/2019  EXAM: XR SHOULDER LEFT 2 OR MORE VWS LOCATION: [facility] DATE/TIME: 7/29/2019 3:11 PM INDICATION: pain at posterior left shoulder from fall COMPARISON: None. FINDINGS: Negative  shoulder. No fracture or dislocation.         Acute pain of left shoulder  -     XR Shoulder Left 2 or More VWS; Future    Rib pain on left side  -     XR Ribs Left W PA Chest; Future      Muscular pain. Supportive cares and f/u as directed.

## 2021-05-31 VITALS — BODY MASS INDEX: 25.55 KG/M2 | HEIGHT: 66 IN | WEIGHT: 159 LBS

## 2021-06-01 VITALS — BODY MASS INDEX: 27.04 KG/M2 | WEIGHT: 165 LBS

## 2021-06-02 ENCOUNTER — RECORDS - HEALTHEAST (OUTPATIENT)
Dept: ADMINISTRATIVE | Facility: OTHER | Age: 41
End: 2021-06-02

## 2021-06-02 ENCOUNTER — ANESTHESIA - HEALTHEAST (OUTPATIENT)
Dept: SURGERY | Facility: CLINIC | Age: 41
End: 2021-06-02

## 2021-06-02 VITALS — WEIGHT: 170.3 LBS | HEIGHT: 67 IN | BODY MASS INDEX: 26.73 KG/M2

## 2021-06-03 ENCOUNTER — SURGERY - HEALTHEAST (OUTPATIENT)
Dept: SURGERY | Facility: CLINIC | Age: 41
End: 2021-06-03
Payer: COMMERCIAL

## 2021-06-03 ENCOUNTER — RECORDS - HEALTHEAST (OUTPATIENT)
Dept: ADMINISTRATIVE | Facility: OTHER | Age: 41
End: 2021-06-03

## 2021-06-03 VITALS — BODY MASS INDEX: 26.36 KG/M2 | WEIGHT: 162.1 LBS

## 2021-06-03 VITALS — BODY MASS INDEX: 25.96 KG/M2 | WEIGHT: 161.5 LBS | HEIGHT: 66 IN

## 2021-06-03 ASSESSMENT — MIFFLIN-ST. JEOR: SCORE: 1452.87

## 2021-06-05 VITALS — BODY MASS INDEX: 29.29 KG/M2 | HEIGHT: 65 IN | BODY MASS INDEX: 29.12 KG/M2 | WEIGHT: 175 LBS

## 2021-06-06 NOTE — PROGRESS NOTES
Preoperative Exam    Scheduled Procedure: Micro Discectomy  Surgery Date:  03/05/2020  Surgery Location: [facility], fax [phone]    Surgeon:  Dr. Bolton    Assessment/Plan:     Larissa was seen today for pre-op exam.    Preop examination  -     HM2(CBC w/o Differential)    Intractable periodic headache syndrome  Stable   PCOS (polycystic ovarian syndrome)  -     Basic Metabolic Panel  -     spironolactone (ALDACTONE) 50 MG tablet; Take 1 tablet (50 mg total) by mouth daily.    DDD (degenerative disc disease), lumbar  -     CK Total    Screening for lipoid disorders  -     Lipid [place]            Surgical Procedure Risk: Low (reported cardiac risk generally < 1%)  Have you had prior anesthesia?: Yes  Have you or any family members had a previous anesthesia reaction:  No  Do you or any family members have a history of a clotting or bleeding disorder?: No  Cardiac Risk Assessment: no increased risk for major cardiac complications    Patient approved for surgery with general or local anesthesia.    Functional Status: Independent  Patient plans to recover at home with family.     Subjective:      Larissa Elliott is a 39 y.o. female who presents for a preoperative consultation.  Patient with known history of degenerative disc disease of the lumbar spine with previous history of microdiscectomy at L5-S1 twice now planning to have at L4-L5 more symptoms on the right side.  Including numbness tingling and weakness  Currently not on any narcotics    Review of Systems - History obtained from chart review and the patient   General ROS: negative  Psychological ROS: negative  Ophthalmic ROS: negative  ENT ROS: negative  Allergy and Immunology ROS: negative  Hematological and Lymphatic ROS: negative  Endocrine ROS: negative  Respiratory ROS: no cough, shortness of breath, or wheezing  Cardiovascular ROS: no chest pain or dyspnea on exertion  Gastrointestinal ROS: no abdominal pain, change in bowel habits, or black or  bloody stools  Genito-Urinary ROS: no dysuria, trouble voiding, or hematuria  Musculoskeletal ROS:  Back and leg pain and weakness   Neurological ROS: no TIA or stroke symptoms  Dermatological ROS: negative    Pertinent History    Do you have difficulty breathing or chest pain after walking up a flight of stairs: No  History of obstructive sleep apnea: No  Steroid use in the last 6 months: Yes: patient was previous on a steriod pack for pain but has been done for approximately 2 weeks.   Frequent Aspirin/NSAID use: No  Prior Blood Transfusion: No  Prior Blood Transfusion Reaction: No  If for some reason prior to, during or after the procedure, if it is medically indicated, would you be willing to have a blood transfusion?:  There is no transfusion refusal.    Current Outpatient Medications   Medication Sig Dispense Refill     levonorgestrel (MIRENA) 20 mcg/24 hr (5 years) IUD        rizatriptan (MAXALT) 10 MG tablet Take 1 tablet (10 mg total) by mouth as needed for migraine. May repeat in 2 hours if needed 10 tablet 5     spironolactone (ALDACTONE) 50 MG tablet Take 1 tablet (50 mg total) by mouth daily. 90 tablet 3     topiramate (TOPAMAX) 25 MG tablet Take 1 tablet by mouth at bedtime.  1     No current facility-administered medications for this visit.         Allergies   Allergen Reactions     Latex Rash       Patient Active Problem List   Diagnosis     Dyspepsia     Symptomatic varicose veins, bilateral     Dietary counseling and surveillance     PCOS (polycystic ovarian syndrome)     Intractable periodic headache syndrome       Past Medical History:   Diagnosis Date     Abnormal Pap smear of cervix     Was told Precancerous cells & HPV     PCOS (polycystic ovarian syndrome)        Past Surgical History:   Procedure Laterality Date     back surgery Bilateral 2003    Disc ectomy L5/S1     BACK SURGERY Left 2003    L5 S1     ENDOMETRIAL ABLATION       EXPLORATORY LAPAROTOMY       GYNECOLOGIC CRYOSURGERY        LUMBAR DISCECTOMY Left 1/30/2015    Procedure: LEFT LUMBAR 4-5 MICRODISCECTOMY AND REVISION LEFT LUMBAR 5-SACRAL 1 MICRODISCECTOMY;  Surgeon: Mick Bolton MD;  Location: [facility] OR;  Service:      LUMBAR DISCECTOMY Left 1/30/2015    Procedure: LEFT L4-5 MICRODISCECTOMY AND REVISION LEFT L5-S1 MICRODISCECTOMY;  Surgeon: Mick Bolton MD;  Location: [facility];  Service:      SPINE SURGERY         Social History     Socioeconomic History     Marital status:      Spouse name: Not on file     Number of children: Not on file     Years of education: Not on file     Highest education level: Not on file   Occupational History     Not on file   Social Needs     Financial resource strain: Not on file     Food insecurity:     Worry: Not on file     Inability: Not on file     Transportation needs:     Medical: Not on file     Non-medical: Not on file   Tobacco Use     Smoking status: Former Smoker     Smokeless tobacco: Never Used   Substance and Sexual Activity     Alcohol use: No     Drug use: No     Sexual activity: Yes     Partners: Male   Lifestyle     Physical activity:     Days per week: Not on file     Minutes per session: Not on file     Stress: Not on file   Relationships     Social connections:     Talks on phone: Not on file     Gets together: Not on file     Attends Taoism service: Not on file     Active member of club or organization: Not on file     Attends meetings of clubs or organizations: Not on file     Relationship status: Not on file     Intimate partner violence:     Fear of current or ex partner: Not on file     Emotionally abused: Not on file     Physically abused: Not on file     Forced sexual activity: Not on file   Other Topics Concern     Not on file   Social History Narrative    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   HEENT: normocephalic conjunctivae are clear, Normal pearly TMs bilaterally without erythema, pus or fluid.   Nose is clear.  Oropharynx is moist and clear,   Neck: supple without adenopathy or thyromegaly.  Lungs: Good aeration bilaterally. No prolongation of expiratory phase.   No tachypnea, retractions, or increased work of breathing. Clear to auscultation without wheezes, rales or rhonci.    Heart: regular rate and rhythm, normal S1 and S2, no murmurs  Abdomen: soft and nontender, bowel sounds are present, no hepatosplenomegaly or mass palpable.  Skin: clear without rash or lesions  Neuro: alert, interactive moving all extremities equally, normal muscle tone in all 4 extremities,     EKG:   Not indicated     Labs:  Recent Results (from the past 240 hour(s))   2(CBC w/o Differential)    Collection Time: 02/26/20  2:13 PM   Result Value Ref Range    WBC 3.3 (L) 4.0 - 11.0 thou/uL    RBC 3.89 3.80 - 5.40 mill/uL    Hemoglobin 12.7 12.0 - 16.0 g/dL    Hematocrit 37.7 35.0 - 47.0 %    MCV 97 80 - 100 fL    MCH 32.7 27.0 - 34.0 pg    MCHC 33.7 32.0 - 36.0 g/dL    RDW 11.5 11.0 - 14.5 %    Platelets 199 140 - 440 thou/uL    MPV 7.7 7.0 - 10.0 fL       Immunization History   Administered Date(s) Administered     Influenza, inj, historic,unspecified 09/01/2014, 10/05/2017, 10/05/2017, 10/17/2019     Influenza,seasonal quad, PF 01/24/2014     Td,adult,historic,unspecified 07/07/2014           Electronically signed by Orly Almanza MD 02/26/20 1:47 PM      "

## 2021-06-06 NOTE — ANESTHESIA PREPROCEDURE EVALUATION
Anesthesia Evaluation      Patient summary reviewed   No history of anesthetic complications     Airway   Mallampati: I  Neck ROM: full   Pulmonary - negative ROS    breath sounds clear to auscultation  (-) asthma, shortness of breath, recent URI                         Cardiovascular - negative ROS and normal exam  (-) angina  Rhythm: regular  Rate: normal,         Neuro/Psych - negative ROS     Endo/Other - negative ROS      GI/Hepatic/Renal - negative ROS   (-) GERD          Dental - normal exam     Comment: invisiline in place which she will remove prior to surgery                         Anesthesia Plan  Planned anesthetic: general endotracheal  Pre-op: mg2+  Background Propofol  25mg ketamine  ASA 2   Induction: intravenous   Anesthetic plan and risks discussed with: patient  Anesthesia plan special considerations: antiemetics,   Post-op plan: routine recovery

## 2021-06-06 NOTE — ANESTHESIA CARE TRANSFER NOTE
Last vitals:   Vitals:    03/05/20 1405   BP: 122/58   Pulse: 90   Resp: 16   Temp: 36.6  C (97.8  F)   SpO2: 100%     Patient's level of consciousness is drowsy  Spontaneous respirations: yes  Maintains airway independently: yes  Dentition unchanged: yes  Oropharynx: oropharynx clear of all foreign objects    QCDR Measures:  ASA# 20 - Surgical Safety Checklist: WHO surgical safety checklist completed prior to induction    PQRS# 430 - Adult PONV Prevention: 4558F - Pt received => 2 anti-emetic agents (different classes) preop & intraop  ASA# 8 - Peds PONV Prevention: NA - Not pediatric patient, not GA or 2 or more risk factors NOT present  PQRS# 424 - Natali-op Temp Management: 4559F - At least one body temp DOCUMENTED => 35.5C or 95.9F within required timeframe  PQRS# 426 - PACU Transfer Protocol: - Transfer of care checklist used  ASA# 14 - Acute Post-op Pain: ASA14B - Patient did NOT experience pain >= 7 out of 10

## 2021-06-06 NOTE — PATIENT INSTRUCTIONS - HE
Preparing for Surgery    What you should do:    If you smoke, quit. Smokers may be up to twice as likely to experience complications after surgery such as pneumonia and respiratory distress.  A week of not smoking before surgery can   help you recover faster    If you have a Health Care Directive, please bring a signed copy to the hospital.    Follow the fasting instructions given to you by your surgeon    Make plans for support when you return home from the hospital (who will help you with household chores and driving while you recover?)    Your medicine plan:     Stop all vitamins, mineral and other supplements 1 week before surgery.    Take all other regular medicines with a little water the morning of your surgery or procedure     Do not take any aspirin, AdvilÆ (ibuprofen), or AleveÆ (naproxen) for 7 days before your surgery.  These can increase the risk of bleeding complications during and after your surgery    It is OK to take Tylenol  (acetaminophen) within 7 days of surgery.    Please leave your medicines at home. The hospital will give you any medicines you may need during your hospital stay.    Who should you see if the after hospital plan is not working?    Call your surgeon for any of the following:  o Your incision has any signs of separation  o Signs of infection (increasing redness, swelling, tenderness, warmth, change in appearance, or increased drainage)  o Temperature greater than 101 degrees Fahrenheit  o Drainage from your incision that is green, creamy, sticky, or if it lasts for more than 2 weeks  o Severe pain that is not relieved by medicine, rest or ice    Call your primary care clinic for any of the following:  o If you gain more than three pounds in one day or five pounds in one week  o For trouble breathing more than usual  o Have severe constipation, diarrhea or nausea  o Nausea (upset stomach) and vomiting and/or diarrhea that will not stop  o Blood in your urine or stool    Call [phone]  if you feel you are having a medical emergency    When should you be seen again?    Your surgeon will let you know when to be seen for surgical follow-up       If you stay overnight in the hospital, you may also be scheduled to see your primary care provider within 5 days of going home from the hospital to make sure medicines are not causing problems and you understand how to care for yourself.        Orly Almanza MD 2/26/2020 3:46 PM

## 2021-06-06 NOTE — ANESTHESIA POSTPROCEDURE EVALUATION
Patient: Larissa Elliott  Procedure(s):  MICRODISCECTOMY RIGHT LUMBAR 4-5 LATERAL RECESS DECOMPRESSION RIGHT LUMBAR 4-5 (Right)  Anesthesia type: general    Patient location: PACU  Last vitals:   Vitals Value Taken Time   /59 3/5/2020  3:01 PM   Temp 36.2  C (97.2  F) 3/5/2020  2:40 PM   Pulse 81 3/5/2020  2:54 PM   Resp 14 3/5/2020  2:54 PM   SpO2 95 % 3/5/2020  2:54 PM   Vitals shown include unvalidated device data.  Post vital signs: stable  Level of consciousness: awake and responds to simple questions  Post-anesthesia pain: pain controlled  Post-anesthesia nausea and vomiting: no  Pulmonary: unassisted, return to baseline  Cardiovascular: stable and blood pressure at baseline  Hydration: adequate  Anesthetic events: no    QCDR Measures:  ASA# 11 - Natali-op Cardiac Arrest: ASA11B - Patient did NOT experience unanticipated cardiac arrest  ASA# 12 - Natali-op Mortality Rate: ASA12B - Patient did NOT die  ASA# 13 - PACU Re-Intubation Rate: ASA13B - Patient did NOT require a new airway mgmt  ASA# 10 - Composite Anes Safety: ASA10A - No serious adverse event    Additional Notes: Patient recovering well. A little sleepy but overall doing well.

## 2021-06-07 NOTE — PROGRESS NOTES
Chart reviewed by Ambulatory Quality and Data team    Please abstract the following data from this visit with this patient into the appropriate field in Epic:    Tests that can be patient reported without a hard copy:    Pap smear done on this date: 2/2019 (approximately), by this group: [facility], results were WNL.     Other Tests found in the patient's chart through Chart Review/Care Everywhere:        Note to Abstraction: If this section is blank, no results were found via Chart Review/Care Everywhere.

## 2021-06-09 NOTE — PROGRESS NOTES
ASSESSMENT and PLAN:  1. Acute recurrent frontal sinusitis  She's had long standing sinus drainage and pressure, but nothing to suggest infection.  She doesn't think she has allergies, but she might.  We'll get a CT scan for eval and then consider ENT or Allergy referral.  - CT Sinuses Without Contrast; Future    2. Routine general medical examination at a health care facility  UTD on appropriate screening and immunizations.  BUD for Gyn/pap.    3. Numbness in right leg  This has persisted since her spine surgery.  Her strength has improved.  We'll have her go to the spine clinic for eval.  - Ambulatory referral to Spine Care      Patient Instructions   CT:  [phone]  The [facility] will contact you for an appt.  Set up a time to come in for fasting labs.  Take care!      Orders Placed This Encounter   Procedures     CT Sinuses Without Contrast     Standing Status:   Future     Standing Expiration Date:   4/5/2018     Scheduling Instructions:      SCHEDULING: Will patient schedule appointment? Yes     LOCATION:             LOCATIONS:        - [facility] ([facility]), PHONE: [phone]      - [facility] ([facility]), PHONE: [phone]     Order Specific Question:   Is the patient pregnant?     Answer:   No     Order Specific Question:   Can the procedure be changed per Radiologist protocol?     Answer:   Yes     Ambulatory referral to Spine Care     Referral Priority:   Routine     Referral Type:   Consultation     Referral Reason:   Evaluation and Treatment     Number of Visits Requested:   1     There are no discontinued medications.    No Follow-up on file.    ASSESSED PROBLEMS:  Problem List Items Addressed This Visit     None      Visit Diagnoses     Routine general medical examination at a health care facility    -  Primary    Acute recurrent frontal sinusitis        Relevant Orders    CT Sinuses Without Contrast    Numbness in right leg        Relevant Orders    Ambulatory  referral to Spine Care          CHIEF COMPLAINT:  Chief Complaint   Patient presents with     Annual Exam     Not fasting, no pap       HISTORY OF PRESENT ILLNESS:  Larissa Elliott is a 37 y.o. female is presenting to the clinic today for an annual exam. She is accompanied by her 2 year old daughter today.     Sinuses: She has had a constant cough and frequent headaches. She claims that it feels like she is drowning. She has not seen an ENT yet nor taken any antibiotics. She denies any allergies, or at least any that she is aware of.     Right Leg Numbness: She had back surgery and since then the feeling in her leg has not returned. She did experience leg weakness after the surgery as well. She worked with a  and was able to improve, but the intermittent numbness has persisted.     Health Maintenance: She regularly visits a gynecologist for pap smears and breast exams.     REVIEW OF SYSTEMS:   She denies fevers and chills. All other systems are negative.    PFSH:  No changes to her family history. Her  works in infectious disease. Her birthday was yesterday and she is traveling to [place]. Her cousin who is 21 had her thyroid removed due to a benign tumor.     Past Medical History:   Diagnosis Date     Abnormal Pap smear of cervix      Female infertility      PCOS (polycystic ovarian syndrome)      Past Surgical History:   Procedure Laterality Date     back surgery Bilateral 2003    Disc ectomy L5/S1     BACK SURGERY Left 2003    L5 S1     ENDOMETRIAL ABLATION       EXPLORATORY LAPAROTOMY       GYNECOLOGIC CRYOSURGERY       LUMBAR DISCECTOMY Left 1/30/2015    Procedure: LEFT LUMBAR 4-5 MICRODISCECTOMY AND REVISION LEFT LUMBAR 5-SACRAL 1 MICRODISCECTOMY;  Surgeon: Mick Bolton MD;  Location: [facility];  Service:      LUMBAR DISCECTOMY Left 1/30/2015    Procedure: LEFT L4-5 MICRODISCECTOMY AND REVISION LEFT L5-S1 MICRODISCECTOMY;  Surgeon: Mick Bolton MD;  Location:  [facility];  Service:      Family History   Problem Relation Age of Onset     Asthma Father      Asthma Brother      Breast cancer Maternal Grandmother      COPD Paternal Grandfather      Social History     Social History     Marital status:      Spouse name: N/A     Number of children: N/A     Years of education: N/A     Occupational History     Not on file.     Social History Main Topics     Smoking status: Never Smoker     Smokeless tobacco: Never Used     Alcohol use No     Drug use: No     Sexual activity: Yes     Partners: Male     Other Topics Concern     Not on file     Social History Narrative       VITALS:  Vitals:    04/04/17 1321   BP: 96/62   Patient Site: Right Arm   Pulse: 80   Weight: 160 lb 5 oz (72.7 kg)   Height: 5' 5.75\" (1.67 m)     Wt Readings from Last 3 Encounters:   04/04/17 160 lb 5 oz (72.7 kg)   01/19/16 162 lb 11.2 oz (73.8 kg)   09/15/15 162 lb (73.5 kg)       PHYSICAL EXAM:  Constitutional:  Reveals an alert, pleasant middle aged female.   Vitals:  Noted.   Head: Normocephalic, without obvious abnormality, atraumatic   Eyes: PERRL, conjunctiva/corneas clear, EOM's intact   Throat: Lips, mucosa, and tongue normal; teeth and gums normal   Neck: Normal ROM, no carotid bruits, no thyromegaly   Lungs: Clear to auscultation bilaterally, respirations unlabored.   Heart: Regular rate and rhythm, S1 and S2 normal, no murmur, rub, or gallop,   Abdomen: Soft, non-tender, bowel sounds active all four quadrants, no masses, no organomegaly   Extremities: Extremities normal, atraumatic, no cyanosis or edema   Skin: Skin color, texture, turgor normal, no rashes or lesions   Neurologic: Normal      ADDITIONAL HISTORY SUMMARIZED (2): None.  DECISION TO OBTAIN EXTRA INFORMATION (1): Requested Access to Old Records.   RADIOLOGY TESTS (1): None.  LABS (1): Ordered labs.   MEDICINE TESTS (1): None.  INDEPENDENT REVIEW (2 each): None.     The visit lasted a total of 11 minutes face to face with " the patient. Over 50% of the time was spent counseling and educating the patient about sinuses.    I, Charlee Douglas, am scribing for and in the presence of, Dr. Quyen Pimentel.    I, Dr. Quyen Pimentel, personally performed the services described in this documentation, as scribed by Charlee Douglas in my presence, and it is both accurate and complete.    MEDICATIONS:  Current Outpatient Prescriptions   Medication Sig Dispense Refill     rizatriptan (MAXALT) 10 MG tablet Take 1 tablet (10 mg total) by mouth as needed for migraine. May repeat in 2 hours if needed 10 tablet 5     spironolactone (ALDACTONE) 50 MG tablet Take 1 tablet (50 mg total) by mouth daily. 90 tablet 3     No current facility-administered medications for this visit.        Total data points: 2

## 2021-06-10 NOTE — PROGRESS NOTES
Assessment:    Chronic nasal congestion and posterior nasal drainage.  No found allergic sensitization.  Consider nonallergic rhinitis, gastroesophageal reflux disease as triggers.    Plan:    Recommend follow-up with ENT and CT scan to be completed.    Consider Astelin 2 sprays each nostril twice daily.  Consider trial of Prilosec daily for 1 month.    Follow-up in allergy clinic as needed.  ____________________________________________________________________________     Larissa is here for evaluation of chronic drainage.  She reports a chronic posterior nasal drainage over the past several years.  She has a productive cough.  She describes whitish mucus.  Occasionally she will have voice hoarseness.  She also reports occasional sneezing, itchy watery eyes.  No wheezing or shortness of breath.  She feels that her cough is from drainage.  Her symptoms are year-round without any clear trigger.  Of note, she did get better when traveling to [place] for 2-1/2 weeks.  She has tried Claritin and Allegra without much benefit.  Saline nasal washes have not helped either.  She does have a history of gastric ulcers and occasional heartburn.  She estimates approximately once or twice a month.  She will occasionally use Prilosec when having active symptoms.    Review of symptoms:  As above, otherwise negative    Past medical history: No other chronic medical conditions noted.    Allergies: No known allergies to medications,  foods or hymenoptera venom.  Contact allergy to latex.    Family history: Father and brother with asthma.  Daughter with allergies.    Social history: Currently lives in a house that was built in 2014.  She has been in this house since 2014.  It has forced air heat and central air conditioning.  There is a basement present.  She has a cat in the home.  She reports a long history of having cats in the home.  It does not routinely go in her bedroom.  No visible water seepage or mold in the home.  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  She works as a .  She is not a cigarette smoker.    Medications: reviewed in chart    Physical Exam:  General:  Alert and oriented.  Eyes:  Sclera clear.  Ears: TMs translucent grey with bony landmarks visible. Nose: Pale, boggy mucosal membranes.  Throat: Pink, moist.  No lesions.  Neck: Supple.  No lymphadenopathy.  Lungs: CTA.  CV: Regular rate and rhythm. Extremities: Well perfused.  No clubbing or cyanosis. Skin: No rash    Last Percutaneous Allergy Test Results  Trees  Trav, White  1:20 H  (W/F in mm): 0/0 (04/27/17 1514)  Birch Mix 1:20 H (W/F in mm): 0/0 (04/27/17 1514)  Garfield, Common 1:20 H (W/F in mm): 0/0 (04/27/17 1514)  Elm, American 1:20 H (W/F in mm): 0/0 (04/27/17 1514)  Waldo, Shagbark 1:20 H (W/F in mm): 0/0 (04/27/17 1514)  Maple, Hard/Sugar 1:20 H (W/F in mm): 0/0 (04/27/17 1514)  Barton Mix 1:20 H (W/F in mm): 0/0 (04/27/17 1514)  [place], Red 1:20 H (W/F in mm): 0/0 (04/27/17 1514)  Slaughter, American 1:20 H (W/F in mm): 0/0 (04/27/17 1514)  [place] Tree 1:20 H (W/F in mm): 0/0 (04/27/17 1514)  Dust Mites  D. Pteronyssinus Mite 30,000 AU/ML H (W/F in mm): 0/0 (04/27/17 1514)  D. Farinae Mite 30,000 AU/ML H (W/F in mm: 0/0 (04/27/17 1514)  Grasses  Grass Mix #4 10,000 BAU/ML H: 0/0 (04/27/17 1514)  Margarito Grass 1:20 H (W/F in mm): 0/0 (04/27/17 1514)  Cockroach  Cockroach Mix 1:10 H (W/F in mm): 0/0 (04/27/17 1514)  Molds/Fungi  Alternaria Tenuis 1:10 H (W/F in mm): 0/0 (04/27/17 1514)  Aspergillus Fumigatus 1:10 H (W/F in mm): 0/0 (04/27/17 1514)  Homodendrum Cladosporioides 1:10 H (W/F in mm): 0/0 (04/27/17 1514)  Penicillin Notatum 1:10 H (W/F in mm): 0/0 (04/27/17 1514)  Epicoccum 1:10 H (W/F in mm): 0/0 (04/27/17 1514)  Weeds  Ragweed, Short 1:20 H (W/F in mm): 0/0 (04/27/17 1514)  Dock, Sorrel 1:20 H (W/F in mm): 0/0 (04/27/17 1514)  Lamb's Quarter 1:20 H (W/F in mm): 0/0 (04/27/17 1514)  Pigweed, Rough Red Root 1:20 H  (W/F in  mm): 0/0 (04/27/17 1514)  Plantain, English 1:20 H  (W/F in mm): 0/0 (04/27/17 1514)  Sagebrush, Mugwort 1:20 H  (W/F in mm): 0/0 (04/27/17 1514)  Animal  Cat 10,000 BAU/ML H (W/F in mm): 0/0 (04/27/17 1514)  Dog 1:10 H (W/F in mm): 0/0 (04/27/17 1514)  Controls  Device Type: QUINTIP (04/27/17 1514)  Neg. control: 50% Glycerine/Saline H (W/F in mm): 0/0 (04/27/17 1514)  Pos. control: Histamine 6mg/ML (W/F in mms): 7/40 (04/27/17 1514)     This transcription uses voice recognition software, which may contain typographical errors.
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
## 2021-06-14 NOTE — PROGRESS NOTES
Patient is here for a Carpal Tunnel consult.   She has numbness and tingling in her left hand that wakes her up at night. The right does bother her but bot as bad. She has weakness in her hands.   Yoselin David,[facility],11:15 AM

## 2021-06-14 NOTE — PATIENT INSTRUCTIONS - HE
Thank you for choosing the [facility] for your EMG testing.    The ordering provider will receive your final EMG results within the next few days.  Please follow up with your provider for the results and further treatment recommendations.

## 2021-06-14 NOTE — PROGRESS NOTES
NEUROSURGERY CONSULTATION NOTE    1/26/2021     Larissa Elliott is a 40 y.o. female who is sent to us in consultation by Orly Almanza for evaluation of carpal tunnel syndrome.         CONSULTATION ASSESSMENT AND PLAN:    39 yo female with h/o right endoscopic carpal tunnel release who presents with b/l CTS L>R. No significant improvement with conservative management. EMG bilateral median neuropathy mild in severity. Risks and benefits of CTR discussed. She has agreed to proceed.    I spent more than 30 minutes in this apt, examining the pt, reviewing the scans, reviewing notes from chart, discussing treatment options with risks and benefits and coordinating care.     Anna Barreranett     HPI: 39 yo female who presents with b/l CTS. She has left greater then right numbness and tingling in 1-3 digit. She had previous right CTR in 2015. Following surgery she did better with improvement in symptoms. Her right hand has worsened in the last 6 months since that time. Left hand began years ago but also worsened in the last 6 months. Symptoms mostly at night but also will get it when for instance holding a phone or driving during the day. Mild pain in the hands. Feels weak when symptoms severe. Has tried wrist splints with no relief. No neck pain or radicular symptoms or bowel or bladder dsyfunction.       Past Medical History:   Diagnosis Date     Abnormal Pap smear of cervix     Was told Precancerous cells & HPV     PCOS (polycystic ovarian syndrome)      Past Surgical History:   Procedure Laterality Date     back surgery Bilateral 2003    Disc ectomy L5/S1     BACK SURGERY Left 2003    L5 S1     ENDOMETRIAL ABLATION       EXPLORATORY LAPAROTOMY       GYNECOLOGIC CRYOSURGERY       LUMBAR DISCECTOMY Left 1/30/2015    Procedure: LEFT LUMBAR 4-5 MICRODISCECTOMY AND REVISION LEFT LUMBAR 5-SACRAL 1 MICRODISCECTOMY;  Surgeon: Mick Bolton MD;  Location: [facility];  Service:      LUMBAR DISCECTOMY Left  1/30/2015    Procedure: LEFT L4-5 MICRODISCECTOMY AND REVISION LEFT L5-S1 MICRODISCECTOMY;  Surgeon: Mick Bolton MD;  Location: [facility] OR;  Service:      SD LAMNOTMY INCL W/DCMPRSN NRV ROOT 1 INTRSPC LUMBR Right 3/5/2020    Procedure: MICRODISCECTOMY RIGHT LUMBAR 4-5 LATERAL RECESS DECOMPRESSION RIGHT LUMBAR 4-5;  Surgeon: Mick Bolton MD;  Location: [facility] OR;  Service: Spine     SPINE SURGERY         REVIEW OF SYSTEMS:  ROS reviewed with pt as documented on pt health form of 1/26/2021.      MEDICATIONS:  Current Outpatient Medications   Medication Sig Dispense Refill     acetaminophen (TYLENOL) 500 MG tablet Take 1 tablet (500 mg total) by mouth every 4 (four) hours.  0     cholecalciferol, vitamin D3, 1,000 unit (25 mcg) tablet Take 2,000 Units by mouth daily.       clonazePAM (KLONOPIN) 0.5 MG tablet Take 1 tablet (0.5 mg total) by mouth 2 (two) times a day. 20 tablet 0     escitalopram oxalate (LEXAPRO) 10 MG tablet Take 1 tablet (10 mg total) by mouth daily. 45 tablet 2     levonorgestrel (MIRENA) 20 mcg/24 hr (5 years) IUD 1 each by Intrauterine route.        multivitamin with minerals (THERA-M) 9 mg iron-400 mcg Tab tablet Take 1 tablet by mouth daily.       spironolactone (ALDACTONE) 50 MG tablet Take 1 tablet (50 mg total) by mouth daily. 90 tablet 3     SUMAtriptan (IMITREX) 25 MG tablet Take 25 mg by mouth as needed for migraine.       topiramate (TOPAMAX) 25 MG tablet Take 25 mg by mouth daily.       No current facility-administered medications for this visit.          ALLERGIES/SENSITIVITIES:     Allergies   Allergen Reactions     Latex Rash       PERTINENT SOCIAL HISTORY:   Social History     Socioeconomic History     Marital status:      Spouse name: None     Number of children: None     Years of education: None     Highest education level: None   Occupational History     None   Social Needs     Financial resource strain: None     Food insecurity      "Worry: None     Inability: None     Transportation needs     Medical: None     Non-medical: None   Tobacco Use     Smoking status: Former Smoker     Smokeless tobacco: Never Used   Substance and Sexual Activity     Alcohol use: No     Drug use: No     Sexual activity: Yes     Partners: Male   Lifestyle     Physical activity     Days per week: None     Minutes per session: None     Stress: None   Relationships     Social connections     Talks on phone: None     Gets together: None     Attends Oriental orthodox service: None     Active member of club or organization: None     Attends meetings of clubs or organizations: None     Relationship status: None     Intimate partner violence     Fear of current or ex partner: None     Emotionally abused: None     Physically abused: None     Forced sexual activity: None   Other Topics Concern     None   Social History Narrative    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  Affect is appropriate.  Speech is fluent.  Recent and remote memory are intact.  Attention span and concentration are normal.     Motor: Normal bulk and tone all muscle groups of upper and lower extremities.      Sensory: Sensation intact bilaterally to light touch.      Coordination:  antalgic gait      Reflexes; supinator, biceps, triceps, knee/ ankle jerk intact. no hoffmans    + tinel and phalen b/l    IMAGING: I personally reviewed all radiographic " images      Cc:   Orly Almanza MD  [id number] [phone]

## 2021-06-14 NOTE — PATIENT INSTRUCTIONS - HE
Bilateral Carpal Tunnel Surgery.     Provided complete information about approaching surgery.  Discussed discharge planning and expected outcomes after surgery as well as follow-ups and restrictions.  Emphasized on stop taking ASA, NSAID's, vitamins and /or OTC herbal supplements within 10 days and any anticoagulant meds within 7 days prior to surgery.  Reminded patient to bring all pertinent films to hospital the day of surgery.    NPO after midnight, Please arrive 2.5 hours prior to scheduled surgery.    Using a washcloth and a bottle of provided Hibiclens, wash your body, avoiding your face and genitals. Preferably, shower the night before surgery and the morning of surgery using a half a bottle each time for your whole body shower. If you are unable to take a shower in the morning of surgery, please discuss your options with the nurse at your readiness visit.     Provided written pamphlets about surgery and Hibiclens bottle.  Answered all questions.  The  will call patient with all pre-op orders and instructions.  Patient to complete all required diagnostic tests prior to surgery.  If test are not completed this will cancel the surgery; contact the clinic nurses at [phone] if unable to complete test.

## 2021-06-14 NOTE — PROGRESS NOTES
Patient presents at the request of Dr. Anna Hameed for a bilateral upper extremity EMG.  She has left greater than right hand numbness and tingling digits 1 through 3.  Has had this for several years history of right carpal tunnel release.  Worse at night and activities such as driving reading and talking on her phone.  Feels weakness of her hands.  No cervical spine pain.  On exam, she has normal strength and sensation to light touch in the upper extremities with negative Bartolome's.  Negative Tinel's at the wrist positive Phalen's.    EMG/NCS  results: Please see scanned full report.    Comment NCS: Abnormal study:    1.  Prolonged bilateral median versus ulnar transcarpal latency comparison.  2.  Normal left median and ulnar SNAPs, bilateral ulnar transcarpal studies, and bilateral median and ulnar CMAP's.    Comment EMG: Normal study.  1.  Normal needle EMG bilateral upper extremities.    Interpretation: Abnormal study: There is electrodiagnostic evidence of:    1.  Bilateral median neuropathy at the wrist consistent with entrapment in the carpal tunnel, very mild in severity.  Right equal to left in severity.    2. There is no electrodiagnostic evidence of cervical radiculopathy, brachial plexopathy, or ulnar neuropathy in the bilateral upper extremities.     Note: Patient may wish to trial over-the-counter carpal tunnel splints at night and will follow up with Dr. Hameed for further recommendations.    The testing was completed in its entirety by the physician.      It was our pleasure caring for your patient today, if there any questions or concerns please do not hesitate to contact us.

## 2021-06-15 NOTE — TELEPHONE ENCOUNTER
PATIENT NAME:  Larissa Elliott  YOB: 1980  MRN: [id number]  SURGEON: Dr. Hameed  DATE of SURGERY: 02/26/2021  PROCEDURE: Left CTR      FOLLOW-UP PLAN:  Wound Check:  n/a  Staples/Sutures Out : 10-12 days  Post Op Visit: 6 weeks  Post-op Provider: NP on Dr. Hameed clinic day  DIAGNOSTICS:  n/a  DISPOSITION:  Home same day      ADDITIONAL INSTRUCTIONS FOR MEDICAL STAFF:      Raquel Rodrigez RN, CNRN

## 2021-06-15 NOTE — ANESTHESIA POSTPROCEDURE EVALUATION
Patient: Larissa Elliott  Procedure(s):  LEFT CARPAL TUNNEL RELEASE (Left)  Anesthesia type: MAC    Patient location: PACU  Last vitals:   Vitals Value Taken Time   /64 02/26/21 0915   Temp 36.6  C (97.8  F) 02/26/21 0900   Pulse 79 02/26/21 0923   Resp 16 02/26/21 0900   SpO2 100 % 02/26/21 0923   Vitals shown include unvalidated device data.  Post vital signs: stable  Level of consciousness: awake and return to baseline  Post-anesthesia pain: pain controlled  Post-anesthesia nausea and vomiting: no  Pulmonary: unassisted, return to baseline  Cardiovascular: stable and blood pressure at baseline  Hydration: adequate  Anesthetic events: no    QCDR Measures:  ASA# 11 - Natali-op Cardiac Arrest: ASA11B - Patient did NOT experience unanticipated cardiac arrest  ASA# 12 - Natali-op Mortality Rate: ASA12B - Patient did NOT die  ASA# 13 - PACU Re-Intubation Rate: NA - No ETT / LMA used for case  ASA# 10 - Composite Anes Safety: ASA10A - No serious adverse event    Additional Notes:

## 2021-06-15 NOTE — PROGRESS NOTES
[address] [address]  Dept: [phone]  Dept Fax: [phone]  Primary Provider: Orly Almanza MD  Pre-op Performing Provider: ORLY ALMANZA    PREOPERATIVE EVALUATION:  Today's date: 2/19/2021    Larissa Elliott is a 40 y.o. female who presents for a preoperative evaluation.    Surgical Information:  Surgery/Procedure: CTS left hand  Surgery Location: [facility]  Surgeon: Yoseph  Surgery Date: 02-26-21  Time of Surgery: 7:30  Where patient plans to recover: At home with family  Fax number for surgical facility: Note does not need to be faxed, will be available electronically in Epic.    Type of Anesthesia Anticipated: Local with MAC    Assessment & Plan      The proposed surgical procedure is considered LOW risk.    Larissa was seen today for pre-op exam.    Diagnoses and all orders for this visit:    Preop examination  -     Asymptomatic COVID-19 Virus (CORONAVIRUS) PCR; Future    Carpal tunnel syndrome of left wrist    Moderate major depression (H)    Pre-op testing  -     Platelet Function Test  -     APTT(PTT)  -     INR  -     Basic Metabolic Panel  -     HM2(CBC w/o Differential)    Encounter for screening for other viral diseases  -     Cancel: Asymptomatic COVID-19 Virus (CORONAVIRUS) PCR           Risks and Recommendations:  The patient has the following additional risks and recommendations for perioperative complications:   - No identified additional risk factors other than previously addressed    Medication Instructions:  Patient is to take all scheduled medications on the day of surgery    RECOMMENDATION:  APPROVAL GIVEN to proceed with proposed procedure, without further diagnostic evaluation.    Review of external notes as documented above   Independent interpretation of a test performed by another physician/other qualified health care professional (not separately reported) - EMG  Discussion of management or test interpretation with external  physician/other qualified healthcare professional/appropriate source - surgical consult       Subjective     HPI related to upcoming procedure: going for left CTR, as pain is getting worse overtime, going through lot of social issues but feel overall doing well     HISTORY 39 yo female who presents with b/l CTS. She has left greater then right numbness and tingling in 1-3 digit. She had previous right CTR in 2015. Following surgery she did better with improvement in symptoms. Her right hand has worsened in the last 6 months since that time. Left hand began years ago but also worsened in the last 6 months. Symptoms mostly at night but also will get it when for instance holding a phone or driving during the day. Mild pain in the hands. Feels weak when symptoms severe. Has tried wrist splints with no relief. No neck pain or radicular symptoms or bowel or bladder dsyfunction.        Preop Questions 2/19/2021   Have you ever had a heart attack or stroke? No   Have you ever had surgery on your heart or blood vessels, such as a stent placement, a coronary artery bypass, or surgery on an artery in your head, neck, heart, or legs? No   Do you have chest pain with activity? No   Do you have a history of  heart failure? No   Do you currently have a cold, bronchitis or symptoms of other infection? No   Do you have a cough, shortness of breath, or wheezing? No   Do you or anyone in your family have previous history of blood clots? No   Do you or does anyone in your family have a serious bleeding problem such as prolonged bleeding following surgeries or cuts? No   Have you ever had problems with anemia or been told to take iron pills? No   Have you had any abnormal blood loss such as black, tarry or bloody stools, or abnormal vaginal bleeding? No   Have you ever had a blood transfusion? No   Are you willing to have a blood transfusion if it is medically needed before, during, or after your surgery? Yes   Have you or any of your  relatives ever had problems with anesthesia? No   Do you have sleep apnea, excessive snoring or daytime drowsiness? No   Do you have any artifical heart valves or other implanted medical devices like a pacemaker, defibrillator, or continuous glucose monitor? No   Do you have artificial joints? No   Are you allergic to latex? Yes    Is there any chance that you may be pregnant? No     Health Care Directive:  Patient does not have a Health Care Directive or Living Will: Discussed advance care planning with patient; information given to patient to review.    Preoperative Review of :    reviewed - no record of controlled substances prescribed.    DEPRESSION - Patient has a long history of Depression of moderate severity requiring medication for control with recent symptoms being stable..Current symptoms of depression include weight gain, psychomotor agitation (restless and /or feeling on edge), anxiety, irritablility.       Review of Systems  CONSTITUTIONAL: NEGATIVE for fever, chills, change in weight  ENT/MOUTH: Negative for ear, mouth, and throat problems  RESP: NEGATIVE for significant cough or SOB  CV: NEGATIVE for chest pain, palpitations or peripheral edema    Patient Active Problem List    Diagnosis Date Noted     Moderate major depression (H) 02/19/2021     Carpal tunnel syndrome of left wrist 01/29/2021     Foraminal stenosis of lumbar region 11/25/2020     Intractable periodic headache syndrome 02/26/2020     PCOS (polycystic ovarian syndrome) 07/19/2019     Dietary counseling and surveillance 05/31/2018     Symptomatic varicose veins, bilateral 09/15/2015     Dyspepsia      Past Medical History:   Diagnosis Date     Abnormal Pap smear of cervix     Was told Precancerous cells & HPV     Moderate major depression (H) 2/19/2021     PCOS (polycystic ovarian syndrome)      Past Surgical History:   Procedure Laterality Date     back surgery Bilateral 2003    Disc ectomy L5/S1     BACK SURGERY Left 2003    L5  S1     ENDOMETRIAL ABLATION       EXPLORATORY LAPAROTOMY       GYNECOLOGIC CRYOSURGERY       LUMBAR DISCECTOMY Left 1/30/2015    Procedure: LEFT LUMBAR 4-5 MICRODISCECTOMY AND REVISION LEFT LUMBAR 5-SACRAL 1 MICRODISCECTOMY;  Surgeon: Mick Bolton MD;  Location: [facility] Main OR;  Service:      LUMBAR DISCECTOMY Left 1/30/2015    Procedure: LEFT L4-5 MICRODISCECTOMY AND REVISION LEFT L5-S1 MICRODISCECTOMY;  Surgeon: Mick Bolton MD;  Location: [facility] Main OR;  Service:      FL LAMNOTMY INCL W/DCMPRSN NRV ROOT 1 INTRSPC LUMBR Right 3/5/2020    Procedure: MICRODISCECTOMY RIGHT LUMBAR 4-5 LATERAL RECESS DECOMPRESSION RIGHT LUMBAR 4-5;  Surgeon: Mick Bolton MD;  Location: [facility] Main OR;  Service: Spine     SPINE SURGERY       Current Outpatient Medications   Medication Sig Dispense Refill     acetaminophen (TYLENOL) 500 MG tablet Take 1 tablet (500 mg total) by mouth every 4 (four) hours.  0     cholecalciferol, vitamin D3, 1,000 unit (25 mcg) tablet Take 2,000 Units by mouth daily.       escitalopram oxalate (LEXAPRO) 10 MG tablet Take 1 tablet (10 mg total) by mouth daily. 45 tablet 2     levonorgestrel (MIRENA) 20 mcg/24 hr (5 years) IUD 1 each by Intrauterine route.        multivitamin with minerals (THERA-M) 9 mg iron-400 mcg Tab tablet Take 1 tablet by mouth daily.       spironolactone (ALDACTONE) 50 MG tablet Take 1 tablet (50 mg total) by mouth daily. 90 tablet 3     clonazePAM (KLONOPIN) 0.5 MG tablet Take 1 tablet (0.5 mg total) by mouth 2 (two) times a day. 20 tablet 0     SUMAtriptan (IMITREX) 25 MG tablet Take 25 mg by mouth as needed for migraine.       topiramate (TOPAMAX) 25 MG tablet Take 25 mg by mouth daily.       No current facility-administered medications for this visit.        Allergies   Allergen Reactions     Latex Rash       Social History     Tobacco Use     Smoking status: Former Smoker     Smokeless tobacco: Never Used   Substance Use Topics      Alcohol use: No      Family History   Problem Relation Age of Onset     Asthma Father      Sarcoidosis Father      Migraines Father      Asthma Brother      Breast cancer Maternal Grandmother         Premenopausal     COPD Paternal Grandfather      Psoriasis Mother      Social History     Substance and Sexual Activity   Drug Use No        Objective     There were no vitals taken for this visit.  Physical Exam  GENERAL APPEARANCE: healthy, alert and no distress  HENT: ear canals and TM's normal and nose and mouth without ulcers or lesions  RESP: lungs clear to auscultation - no rales, rhonchi or wheezes  CV: regular rate and rhythm, normal S1 S2, no S3 or S4 and no murmur, click or rub  ABDOMEN: soft, nontender, no HSM or masses and bowel sounds normal  MS: extremities normal- no gross deformities noted  SKIN: no suspicious lesions or rashes  NEURO: Normal strength and tone, sensory exam grossly normal, mentation intact and speech normal    Recent Labs   Lab Test 02/26/20  1413 12/05/19  0837   HGB 12.7  --      --     139   K 3.9 4.3   CREATININE 0.75 0.93        PRE-OP Diagnostics:   Labs pending at this time. Results will be reviewed when available.  No EKG required, no history of coronary heart disease, significant arrhythmia, peripheral arterial disease or other structural heart disease.    REVISED CARDIAC RISK INDEX (RCRI)   The patient has the following serious cardiovascular risks for perioperative complications:   - No serious cardiac risks = 0 points    RCRI INTERPRETATION: 0 points: Class I (very low risk - 0.4% complication rate)  90       Signed Electronically by: Orly Almanza MD    And we are

## 2021-06-15 NOTE — PROGRESS NOTES
Patient presents for 2 week incision nurse visit check.     DOS: 2/26/21  Procedure: Left CTR   Surgeon: Yoseph    Today comes in for 2-week wound check. Pt reports pre-op  N/T in left fingers is completely eliminated. Minor c/o incisional pain r/t sutures; otherwise pt is pain free. Patient is pleased with the outcome of the surgery. Pt is not using medication for pain.     Surgical wound reddened near the wrist, minor swelling and painful to palpation. Slight skin breakdown d/t use of wrist brace.     Suture removal - sutures intact removed with some difficulty. Consulted with Raquel. Pt started on prophylactic course of Keflex d/t wound redness, pain, and slight swelling. Verbal instructions regarding incision care are given and outlined in AVS.  Pt. advised to call us if any s/s of infection noted - all discussed in detail.     All of patient's questions addressed today. She was instructed to call with any additional questions/concerns.     Caity Thompson RN

## 2021-06-15 NOTE — PATIENT INSTRUCTIONS - HE
WOUND CARE:   No lotions, soaps, powders, ointments, creams, or patches on incision until the wound is well healed.   Keep the wound dry.  If you notice increased or persistent drainage from your wound, contact our office.  You may use an extra large band-aid or 4x4 and tape.  Both can be purchased at your pharmacy.    Wash your hands before changing the dressing or touching the wound. If someone is helping you change the dressing, ensure that the person washes his/her hands.  Good handwashing can decrease the risk of infection.  If you are unable to see the wound, have someone check the wound daily for redness, swelling or drainage.  A small amount of drainage is normal.       Wash your wound daily. Remove all dressings prior to your shower.  Apply mild soap directly to the wound, form a light lather and rinse with running water.     Do not submerge the wound under water. No baths or swimming for 6 weeks.     If you develop redness, swelling, drainage, or temp 101 or greater, please call our office at [phone].    Activity:   Move your finger joints (straighten and bend) at least 10 times, three times a day.   No lifting anything greater than you can put on your fork when eating for 6 weeks.  Wear your splint most of the time for 6 weeks.     ELEVATE operative hand above heart level as much as possible for 3 days after surgery to alleviate swelling.    ICE: We recommend ice to your incision for 20 minutes four times a day for pain control and to decrease post op swelling.  You may alternate the ice with heat for muscle spasms.     We recommend ice to your incision for 20 minutes four times a day for pain control and to decrease post op swelling.  You may alternate the ice with heat for muscle spasms.     You have been prescribed Keflex to prevent incisional infection. Please complete the full course of this antibiotic.

## 2021-06-15 NOTE — ANESTHESIA PREPROCEDURE EVALUATION
Anesthesia Evaluation      Patient summary reviewed   No history of anesthetic complications     Airway   Mallampati: I  Neck ROM: full   Pulmonary - negative ROS and normal exam    breath sounds clear to auscultation                         Cardiovascular - negative ROS and normal exam  Rhythm: regular  Rate: normal,         Neuro/Psych    (+) depression,     Endo/Other - negative ROS      GI/Hepatic/Renal - negative ROS           Dental - normal exam                        Anesthesia Plan  Planned anesthetic: MAC    ASA 2     Anesthetic plan and risks discussed with: patient    Post-op plan: routine recovery

## 2021-06-15 NOTE — TELEPHONE ENCOUNTER
Refill Approved    Rx renewed per Medication Renewal Policy. Medication was last renewed on 2/26/20.    Jose Enrique Valencia, [facility] Triage/Med Refill 3/7/2021     Requested Prescriptions   Pending Prescriptions Disp Refills     spironolactone (ALDACTONE) 50 MG tablet [Pharmacy Med Name: SPIRONOLACTONE 50MG TABLETS] 90 tablet 3     Sig: TAKE 1 TABLET BY MOUTH ONCE DAILY       Diuretics/Combination Diuretics Refill Protocol  Passed - 3/7/2021  6:01 AM        Passed - Visit with PCP or prescribing provider visit in past 12 months     Last office visit with prescriber/PCP: 8/31/2020 Orly Almanza MD OR same dept: 8/31/2020 Orly Almanza MD OR same specialty: 8/31/2020 Orly Almanza MD  Last physical: 2/19/2021 Last MTM visit: Visit date not found   Next visit within 3 mo: Visit date not found  Next physical within 3 mo: Visit date not found  Prescriber OR PCP: Orly Almanza MD  Last diagnosis associated with med order: 1. PCOS (polycystic ovarian syndrome)  - spironolactone (ALDACTONE) 50 MG tablet [Pharmacy Med Name: SPIRONOLACTONE 50MG TABLETS]; TAKE 1 TABLET BY MOUTH ONCE DAILY  Dispense: 90 tablet; Refill: 3    If protocol passes may refill for 12 months if within 3 months of last provider visit (or a total of 15 months).             Passed - Serum Potassium in past 12 months      Lab Results   Component Value Date    Potassium 4.2 02/19/2021             Passed - Serum Sodium in past 12 months      Lab Results   Component Value Date    Sodium 139 02/19/2021             Passed - Blood pressure on file in past 12 months     BP Readings from Last 1 Encounters:   02/26/21 131/64             Passed - Serum Creatinine in past 12 months      Creatinine   Date Value Ref Range Status   02/19/2021 0.77 0.60 - 1.10 mg/dL Final

## 2021-06-15 NOTE — TELEPHONE ENCOUNTER
Called patient, discussed surgery, post-op course, expectations, follow up plan.    Reviewed H&P from 02/19/2021 - cleared for surgery  Labs - WNL    MRI done on  - in [place]    To OR as planned.     Check in - 0530    Nothing to eat or drink after midnight the night before surgery.     Reviewed with patient: Arrive 2 hours prior to scheduled surgery.    Continue to refrain from NSAIDS (Ibuprofen, Aleve, Naprosyn), ASA, Over the counter herbal medications or supplements, anti-coagulants and blood thinners.     Patient confirmed they have help/assistance in place at home upon discharge          Patient was informed that we will provide up to 1 week prescription of pain medication for post-operative pain.      Instructed patient about the following: After your surgery, if you will be staying in-patient, a nursing provider team will be monitoring you closely throughout your stay and communicate your health status to your surgeon and other providers.  You will be seen by Advanced Practice Providers (e.g., nurse practitioners, clinic nurse specialist, and physician assistants) who will check on you regularly to assess the status of your surgery.     Raquel Rodrigez RN, CNRN

## 2021-06-15 NOTE — TELEPHONE ENCOUNTER
ORDER FROM: Dr. Hameed    PRE AUTHORIZATION: No PA required. Ok to schedule.    METHOD OF PATIENT CONTACT: Spoke with Larissa on the phone. Best number to reach: [phone]    PROCEDURE: Left carpal tunnel release    SURGICAL DATE: 21 @ 7:30 AM - [facility]    READINESS VISIT: PLEASE CALL    PCP, CLINIC, PHONE #: Dr. Cami Villalba, [facility], [phone]    PRE-OP PHYSICAL: 21 @ 10:05 AM with Dr. Cami CORNEJO- TESTIN21 @ 4:45 PM at the [facility] lab    FILM INFO: EMG B/L UE: 21 @ [facility]    SURGERY CONFIRMATION LETTER: E-MAILED TO PATIENT ON 21

## 2021-06-15 NOTE — ANESTHESIA CARE TRANSFER NOTE
Last vitals:   Vitals:    02/26/21 0837   BP: 113/55   Pulse: 85   Resp: 16   Temp: 36.3  C (97.3  F)   SpO2: 97%     Patient's level of consciousness is drowsy  Spontaneous respirations: yes  Maintains airway independently: yes  Dentition unchanged: yes  Oropharynx: oropharynx clear of all foreign objects    QCDR Measures:  ASA# 20 - Surgical Safety Checklist: WHO surgical safety checklist completed prior to induction    PQRS# 430 - Adult PONV Prevention: 4558F - Pt received => 2 anti-emetic agents (different classes) preop & intraop  ASA# 8 - Peds PONV Prevention: NA - Not pediatric patient, not GA or 2 or more risk factors NOT present  PQRS# 424 - Natali-op Temp Management: 4559F - At least one body temp DOCUMENTED => 35.5C or 95.9F within required timeframe  PQRS# 426 - PACU Transfer Protocol: - Transfer of care checklist used  ASA# 14 - Acute Post-op Pain: ASA14B - Patient did NOT experience pain >= 7 out of 10

## 2021-06-15 NOTE — TELEPHONE ENCOUNTER
Refill Approved    Rx renewed per Medication Renewal Policy. Medication was last renewed on 8/31/20.    Jose Enrique Valencia, [facility] Triage/Med Refill 2/24/2021     Requested Prescriptions   Pending Prescriptions Disp Refills     escitalopram oxalate (LEXAPRO) 10 MG tablet [Pharmacy Med Name: ESCITALOPRAM 10MG TABLETS] 45 tablet 2     Sig: TAKE 1 TABLET(10 MG) BY MOUTH DAILY FOR 2 WEEKS, THEN INCREASE TO 1 1/2 TABLETS(15MG) DAILY       SSRI Refill Protocol  Passed - 2/24/2021 10:14 AM        Passed - PCP or prescribing provider visit in last year     Last office visit with prescriber/PCP: 8/31/2020 Orly Almanza MD OR same dept: 8/31/2020 Orly Almanza MD OR same specialty: 8/31/2020 Orly Almanza MD  Last physical: 2/19/2021 Last MTM visit: Visit date not found   Next visit within 3 mo: Visit date not found  Next physical within 3 mo: Visit date not found  Prescriber OR PCP: Orly Almanza MD  Last diagnosis associated with med order: 1. Current moderate episode of major depressive disorder without prior episode (H)  - escitalopram oxalate (LEXAPRO) 10 MG tablet [Pharmacy Med Name: ESCITALOPRAM 10MG TABLETS]; TAKE 1 TABLET(10 MG) BY MOUTH DAILY FOR 2 WEEKS, THEN INCREASE TO 1 1/2 TABLETS(15MG) DAILY  Dispense: 45 tablet; Refill: 2    If protocol passes may refill for 12 months if within 3 months of last provider visit (or a total of 15 months).

## 2021-06-16 NOTE — PROGRESS NOTES
Patient is here for a 6 week post op. She states that she is doing well. She does have some residual stitch that is causing some irritation.  Yoselin David,MICHELL,1:12 PM

## 2021-06-16 NOTE — PROGRESS NOTES
ASSESSMENT and PLAN:  1. Migraine  She needed a refill.  - rizatriptan (MAXALT) 10 MG tablet; Take 1 tablet (10 mg total) by mouth as needed for migraine. May repeat in 2 hours if needed  Dispense: 10 tablet; Refill: 5    2. Breast lump in female  We'll evaluate this w/ diagnostic breast imaging  - US Breast Limited (Focal) Left; Future  - Mammo Diagnostic Bilateral; Future    3. PCOS (polycystic ovarian syndrome)  We can check her labs at her request.  - Estradiol  - Follicle Stimulating Hormone (FSH)  - Progesterone  - Glucose  - Thyroid Stimulating Hormone (TSH)  - Luteinizing Hormone (LH)        Medications Discontinued During This Encounter   Medication Reason     rizatriptan (MAXALT) 10 MG tablet Reorder       No Follow-up on file.    Patient Instructions   To set up the mammogram and ultrasound:  [phone]    Today's labs will be available on [employer].  If you aren't signed up, then we'll mail them out.  If anything needs more immediate follow up, we'll also call.        CHIEF COMPLAINT:  Chief Complaint   Patient presents with     Breast Problem     left - noticed 6 months ago - tender to touch - hasn't noticed size change       HISTORY OF PRESENT ILLNESS:  Larissa Elliott is a 37 y.o. female  presenting to the clinic today for evaluation of a lump that she noticed on her left breast.  It has been there for about 4-6 months.  It is not red.  She has not noticed any discharge.  She hasn't appreciated any change in its size.  She has an IUD so it's hard for her to know when her period is.  However, she had some spotting on 2/3.    She was also wanting to know if we could check her hormone levels in relation to her PCOS.  She's having acne and isn't happy with that.    REVIEW OF SYSTEMS:    All other systems are negative.    PFSH:  Her kids have been sick the month of Jan w/ various viral infections and strep      TOBACCO USE:  History   Smoking Status     Former Smoker   Smokeless Tobacco     Never Used        VITALS:  Vitals:    02/13/18 1326   BP: 98/62   Pulse: 80   Weight: 165 lb (74.8 kg)     Wt Readings from Last 3 Encounters:   02/13/18 165 lb (74.8 kg)   09/14/17 159 lb (72.1 kg)   04/04/17 160 lb 5 oz (72.7 kg)       PHYSICAL EXAM:  Constitutional:  Reveals an alert, pleasant adult female.   Vitals:  Noted.   Breast: breasts are symmetric on inspection, in the area of concern, it feels like there is a more prominent cord, but I don't feel a distinct lump.  She says that it's deep where she feels it.    QUALITY MEASURES:  The following high BMI interventions were performed this visit: weight monitoring    MEDICATIONS:  Current Outpatient Prescriptions   Medication Sig Dispense Refill     azelastine (ASTELIN) 137 mcg (0.1 %) nasal spray 2 sprays into each nostril 2 (two) times a day. Use in each nostril as directed 30 mL 11     rizatriptan (MAXALT) 10 MG tablet Take 1 tablet (10 mg total) by mouth as needed for migraine. May repeat in 2 hours if needed 10 tablet 5     omeprazole (PRILOSEC) 20 MG capsule Take 20 mg by mouth 2 (two) times a day before meals.       No current facility-administered medications for this visit.

## 2021-06-16 NOTE — PROGRESS NOTES
[address] [address]  Dept: [phone]  Dept Fax: [phone]  Primary Provider: Orly Almanza MD  Pre-op Performing Provider: ORLY ALMANZA      PREOPERATIVE EVALUATION:  Today's date: 4/20/2021    Larissa Elliott is a 41 y.o. female who presents for a preoperative evaluation.    Surgical Information:  Surgery/Procedure: RIGHT LUMBAR 4-5 REVISION MICRODISCECTOMY  Surgery Location: [facility] Main OR  Surgeon: Dr. Mick Bolton   Surgery Date: 04/23/21  Time of Surgery: 1:25pm  Where patient plans to recover: At home with family  Fax number for surgical facility: Note does not need to be faxed, will be available electronically in Epic.    Type of Anesthesia Anticipated: Local with MAC    Assessment & Plan      The proposed surgical procedure is considered LOW risk.    Larissa was seen today for pre-op exam.    Diagnoses and all orders for this visit:    Preop examination    Lumbar radiculopathy  Comments:  She is scheduled for a redo microdiscectomy with Dr. Bolton on 4/23/2021  Orders:  -     Basic Metabolic Panel  -     HM1(CBC and Differential)    Moderate major depression (H)  Comments:  taking 5 mg lexapro which seems to be helping     Foraminal stenosis of lumbar region    Encounter for screening for other viral diseases  -     Asymptomatic COVID-19 Virus (CORONAVIRUS) PCR    Acne vulgaris  Comments:  taking spironolactone daily for >4 yr           Risks and Recommendations:  The patient has the following additional risks and recommendations for perioperative complications:   - No identified additional risk factors other than previously addressed    Medication Instructions:  Patient is to take all scheduled medications on the day of surgery    RECOMMENDATION:  APPROVAL GIVEN to proceed with proposed procedure, without further diagnostic evaluation.    Review of external notes as documented above   [id number]}    Subjective     HPI related to upcoming  procedure: plan to have repeat lower back surgery for lumbar degenerative disc disease. Had MICRODISCECTOMY RIGHT LUMBAR 4-5 LATERAL RECESS DECOMPRESSION RIGHT LUMBAR 4-5 (Right Spine Lumbar) . Symptoms improved only for short time now pain 8/10 most of the time       Preop Questions 4/20/2021   Have you ever had a heart attack or stroke? No   Have you ever had surgery on your heart or blood vessels, such as a stent placement, a coronary artery bypass, or surgery on an artery in your head, neck, heart, or legs? No   Do you have chest pain with activity? No   Do you have a history of  heart failure? No   Do you currently have a cold, bronchitis or symptoms of other infection? No   Do you have a cough, shortness of breath, or wheezing? No   Do you or anyone in your family have previous history of blood clots? No   Do you or does anyone in your family have a serious bleeding problem such as prolonged bleeding following surgeries or cuts? No   Have you ever had problems with anemia or been told to take iron pills? No   Have you had any abnormal blood loss such as black, tarry or bloody stools, or abnormal vaginal bleeding? No   Have you ever had a blood transfusion? No   Are you willing to have a blood transfusion if it is medically needed before, during, or after your surgery? Yes   Have you or any of your relatives ever had problems with anesthesia? No   Do you have sleep apnea, excessive snoring or daytime drowsiness? No   Do you have any artifical heart valves or other implanted medical devices like a pacemaker, defibrillator, or continuous glucose monitor? No   Do you have artificial joints? No   Are you allergic to latex? Yes -    Is there any chance that you may be pregnant? No         Health Care Directive:  Patient does not have a Health Care Directive or Living Will: Discussed advance care planning with patient; information given to patient to review.    Preoperative Review of :    reviewed - controlled  substances reflected in medication list.    See problem list for active medical problems.  Problems all longstanding and stable, except as noted/documented.  See ROS for pertinent symptoms related to these conditions.      Review of Systems  Constitutional, neuro, ENT, endocrine, pulmonary, cardiac, gastrointestinal, genitourinary, musculoskeletal, integument and psychiatric systems are negative, except as otherwise noted.      Patient Active Problem List    Diagnosis Date Noted     Moderate major depression (H) 02/19/2021     Carpal tunnel syndrome of left wrist 01/29/2021     Foraminal stenosis of lumbar region 11/25/2020     Intractable periodic headache syndrome 02/26/2020     PCOS (polycystic ovarian syndrome) 07/19/2019     Dietary counseling and surveillance 05/31/2018     Symptomatic varicose veins, bilateral 09/15/2015     Dyspepsia      Past Medical History:   Diagnosis Date     Abnormal Pap smear of cervix     Was told Precancerous cells & HPV     Moderate major depression (H) 2/19/2021     PCOS (polycystic ovarian syndrome)      Past Surgical History:   Procedure Laterality Date     back surgery Bilateral 2003    Disc ectomy L5/S1     BACK SURGERY Left 2003    L5 S1     ENDOMETRIAL ABLATION       EXPLORATORY LAPAROTOMY       GYNECOLOGIC CRYOSURGERY       LUMBAR DISCECTOMY Left 1/30/2015    Procedure: LEFT LUMBAR 4-5 MICRODISCECTOMY AND REVISION LEFT LUMBAR 5-SACRAL 1 MICRODISCECTOMY;  Surgeon: Mick Bolton MD;  Location: [facility] OR;  Service:      LUMBAR DISCECTOMY Left 1/30/2015    Procedure: LEFT L4-5 MICRODISCECTOMY AND REVISION LEFT L5-S1 MICRODISCECTOMY;  Surgeon: Mick Bolton MD;  Location: [facility] OR;  Service:      NH LAMNOTMY INCL W/DCMPRSN NRV ROOT 1 INTRSPC LUMBR Right 3/5/2020    Procedure: MICRODISCECTOMY RIGHT LUMBAR 4-5 LATERAL RECESS DECOMPRESSION RIGHT LUMBAR 4-5;  Surgeon: Mick Bolton MD;  Location: [facility] OR;  Service:  Spine     WA REVISE MEDIAN N/CARPAL TUNNEL SURG Left 2/26/2021    Procedure: LEFT CARPAL TUNNEL RELEASE;  Surgeon: Anna Hameed MD;  Location: [facility];  Service: Neurosurgery     SPINE SURGERY       Current Outpatient Medications   Medication Sig Dispense Refill     cholecalciferol, vitamin D3, 1,000 unit (25 mcg) tablet Take 2,000 Units by mouth daily.       escitalopram oxalate (LEXAPRO) 10 MG tablet TAKE 1 TABLET(10 MG) BY MOUTH DAILY FOR 2 WEEKS, THEN INCREASE TO 1 1/2 TABLETS(15MG) DAILY 135 tablet 3     levonorgestrel (MIRENA) 20 mcg/24 hr (5 years) IUD 1 each by Intrauterine route.        multivitamin with minerals (THERA-M) 9 mg iron-400 mcg Tab tablet Take 1 tablet by mouth daily.       oxyCODONE-acetaminophen (PERCOCET/ENDOCET) 5-325 mg per tablet Take 1-2 tablets by mouth every 6 (six) hours as needed for pain. 20 tablet 0     spironolactone (ALDACTONE) 50 MG tablet TAKE 1 TABLET BY MOUTH ONCE DAILY 90 tablet 3     SUMAtriptan (IMITREX) 25 MG tablet Take 25 mg by mouth as needed for migraine.       topiramate (TOPAMAX) 25 MG tablet Take 25 mg by mouth daily.       No current facility-administered medications for this visit.        Allergies   Allergen Reactions     Latex Rash       Social History     Tobacco Use     Smoking status: Former Smoker     Smokeless tobacco: Never Used   Substance Use Topics     Alcohol use: No      Family History   Problem Relation Age of Onset     Asthma Father      Sarcoidosis Father      Migraines Father      Asthma Brother      Breast cancer Maternal Grandmother         Premenopausal     COPD Paternal Grandfather      Psoriasis Mother      Social History     Substance and Sexual Activity   Drug Use No        Objective     There were no vitals taken for this visit.  Physical Exam    GENERAL APPEARANCE: healthy, alert and no distress     EYES: EOMI, PERRL     HENT: ear canals and TM's normal and nose and mouth without ulcers or lesions     NECK: no  adenopathy, no asymmetry, masses, or scars and thyroid normal to palpation     RESP: lungs clear to auscultation - no rales, rhonchi or wheezes     CV: regular rates and rhythm, normal S1 S2, no S3 or S4 and no murmur, click or rub     ABDOMEN:  soft, nontender, no HSM or masses and bowel sounds normal     MS: extremities normal- no gross deformities noted, no evidence of inflammation in joints, FROM in all extremities.     SKIN: no suspicious lesions or rashes     NEURO: Normal strength and tone, sensory exam grossly normal, mentation intact and speech normal     PSYCH: mentation appears normal. and affect normal/bright     LYMPHATICS: No cervical adenopathy    Recent Labs   Lab Test 02/19/21  1017 02/26/20  1413   HGB 13.6 12.7    199   INR 0.99  --     140   K 4.2 3.9   CREATININE 0.77 0.75        PRE-OP Diagnostics:   Labs pending at this time. Results will be reviewed when available.  No EKG required, no history of coronary heart disease, significant arrhythmia, peripheral arterial disease or other structural heart disease.    REVISED CARDIAC RISK INDEX (RCRI)   The patient has the following serious cardiovascular risks for perioperative complications:   - No serious cardiac risks = 0 points    RCRI INTERPRETATION: 0 points: Class I (very low risk - 0.4% complication rate)         Signed Electronically by: Orly Almanza MD    Copy of this evaluation report is provided to requesting physician.

## 2021-06-16 NOTE — PATIENT INSTRUCTIONS - HE
Patient is doing well. She will contact the office should her sutures back out further to be removed. She is scheduled for a redo microdiscectomy with Dr. Bolton on 4/23/2021 and will contact the office after her recovery as to when she would like to proceed with her right redo carpal tunnel release. Otherwise follow up will be on an as needed basis.

## 2021-06-16 NOTE — PROGRESS NOTES
NEUROSURGERY FOLLOW UP EVALUATION: 4/13/21  The patient's attending neurosurgeon is Dr. Hameed     Assessment:  Postoperative left carpal tunnel release on February 26 2021      Plan:   Patient is doing well. She will contact the office should her sutures back out further to be removed. She is scheduled for a redo microdiscectomy with Dr. Bolton on 4/23/2021 and will contact the office after her recovery as to when she would like to proceed with her right redo carpal tunnel release. Otherwise follow up will be on an as needed basis.     HPI:   Ms. Elliott is a pleasant 41 year old right handed female, who presents today postoperative left carpal tunnel release on February 26 2021 by Dr. Hameed. Presently she states that she is doing very well and is pleased with her progress since surgery. She has continued tenderness of her incision proximally and notes retention of two or three small sutures. She states that her previous left hand pain and numbness has otherwise completely resolved.     Exam:   Heart Rate:  [88] 88  Resp:  [16] 16  BP: (115)/(78) 115/78  SpO2:  [98 %] 98 %    Alert and oriented x3, speech normal   PERRL, EOMI, face symmetric, tongue midline, shoulder shrug equal   CN II-XII intact  Coordination: no pronator drift, finger to nose smooth and accurate bilaterally   Motor Strength: good strength in all tested muscle groups without hand  weakness   Incision: well healed with noted proximal suture retained, no surrounding erythema, induration, or drainage noted   Area was cleanse and sutures that were showing were removed, retained knots on medial incision under overgrown skin so unable to fully remove suture       Alma Gomez PA-C  [facility] Neurosurgery   O: [phone]

## 2021-06-16 NOTE — TELEPHONE ENCOUNTER
"-Coronavirus (COVID-19) Notification    Caller Name (Patient, parent, daughter/son, grandparent, etc)  patient    Reason for call  Notify of Positive Coronavirus (COVID-19) lab results, assess symptoms,  review [facility] recommendations    Lab Result    Lab test:  2019-nCoV rRt-PCR or SARS-CoV-2 PCR    Oropharyngeal AND/OR nasopharyngeal swabs is POSITIVE for 2019-nCoV RNA/SARS-COV-2 PCR (COVID-19 virus)    RN Recommendations/Instructions per [facility] Coronavirus COVID-19 recommendations    Brief introduction script  Introduce self and then review script:  \"I am calling on behalf of [employer].  We were notified that your Coronavirus test (COVID-19) for was POSITIVE for the virus.  I have some information to relay to you but first I wanted to mention that the [place] Dept of Health will be contacting you shortly [it's possible MD already called Patient] to talk to you more about how you are feeling and other people you have had contact with who might now also have the virus.  Also, [facility] is Partnering with the [facility] for Covid-19 research, you may be contacted directly by research staff.\"    Assessment (Inquire about Patient's current symptoms)   Assessment   Current Symptoms at time of phone call: (if no symptoms, document No symptoms] No symptoms    Symptom onset (if applicable) Tested 4/20/2021     If at time of call, Patients symptoms hare worsened, the Patient should contact 911 or have someone drive them to Emergency Dept promptly:      If Patient calling 911, inform 911 personal that you have tested positive for the Coronavirus (COVID-19).  Place mask on and await 911 to arrive.    If Emergency Dept, If possible, please have another adult drive you to the Emergency Dept but you need to wear mask when in contact with other people.      Monoclonal Antibody Administration    You may be eligible to receive a new treatment with a monoclonal antibody for preventing " "hospitalization in patients at high risk for complications from COVID-19.   This medication is still experimental and available on a limited basis; it is given through an IV and must be given at an infusion center. Please note that not all people who are eligible will receive the medication since it is in limited supply.     Are you interested in being considered for this medication?  No.  Does the patient fit the criteria: No    If patient qualifies based on above criteria:  \"You will be contacted if you are selected to receive this treatment in the next 1-2 business days.   This is time sensitive and if you are not selected in the next 1-2 business days, you will not receive the medication.  If you do not receive a call to schedule, you have not been selected.\"    Review information with Patient    Your result was positive. This means you have COVID-19 (coronavirus).  We have sent you a letter that reviews the information that I'll be reviewing with you now.    How can I protect others?    If you have symptoms: stay home and away from others (self-isolate) until:    You've had no fever--and no medicine that reduces fever--for 1 full day (24 hours). And      Your other symptoms have gotten better. For example, your cough or breathing has improved. And     At least 10 days have passed since your symptoms started. (If you ve been told by a doctor that you have a weak immune system, wait 20 days.)     If you don't have symptoms: Stay home and away from others (self-isolate) until at least 10 days have passed since your first positive COVID-19 test. (Date test collected).    During this time:    Stay in your own room, including for meals. Use your own bathroom if you can.    Stay away from others in your home. No hugging, kissing or shaking hands. No visitors.     Don't go to work, school or anywhere else.     Clean  high touch  surfaces often (doorknobs, counters, handles, etc.). Use a household cleaning spray or " wipes. You'll find a full list on the EPA website at www.epa.gov/pesticide-registration/list-n-disinfectants-use-against-sars-cov-2.     Cover your mouth and nose with a mask, tissue or other face covering to avoid spreading germs.    Wash your hands and face often with soap and water.    Make a list of people you have been in close contact with recently, even if either of you wore a face covering.   ; Start your list from 2 days before you became ill or had a positive test.  ; Include anyone that was within 6 feet of you for a cumulative total of 15 minutes or more in 24 hours. (Example: if you sat next to Simón for 5 minutes in the morning and 10 minutes in the afternoon, then you were in close contact for 15 minutes total that day. Simón would be added to your list.)    A public health worker will call or text you. It is important that you answer. They will ask you questions about possible exposures to COVID-19, such as people you have been in direct contact with and places you have visited.    Tell the people on your list that you have COVID-19; they should stay away from others for 14 days starting from the last time they were in contact with you (unless you are told something different from a public health worker).     Caregivers in these groups are at risk for severe illness due to COVID-19:  o People 65 years and older  o People who live in a nursing home or long-term care facility  o People with chronic disease (lung, heart, cancer, diabetes, kidney, liver, immunologic)  o People who have a weakened immune system, including those who:  - Are in cancer treatment  - Take medicine that weakens the immune system, such as corticosteroids  - Had a bone marrow or organ transplant  - Have an immune deficiency  - Have poorly controlled HIV or AIDS  - Are obese (body mass index of 40 or higher)  - Smoke regularly    Caregivers should wear gloves while washing dishes, handling laundry and cleaning bedrooms and  bathrooms.    Wash and dry laundry with special caution. Don't shake dirty laundry, and use the warmest water setting you can.    If you have a weakened immune system, ask your doctor about other actions you should take.    For more tips, go to www.cdc.gov/coronavirus/2019-ncov/downloads/10Things.pdf.    You should not go back to work until you meet the guidelines above for ending your home isolation. You don't need to be retested for COVID-19 before going back to work--studies show that you won't spread the virus if it's been at least 10 days since your symptoms started (or 20 days, if you have a weak immune system).    Employers: This document serves as formal notice of your employee's medical guidelines for going back to work. They must meet the above guidelines before going back to work in person.    How can I take care of myself?    1. Get lots of rest. Drink extra fluids (unless a doctor has told you not to).    2. Take Tylenol (acetaminophen) for fever or pain. If you have liver or kidney problems, ask your family doctor if it's okay to take Tylenol.     Take either:     650 mg (two 325 mg pills) every 4 to 6 hours, or     1,000 mg (two 500 mg pills) every 8 hours as needed.     Note: Don't take more than 3,000 mg in one day. Acetaminophen is found in many medicines (both prescribed and over-the-counter medicines). Read all labels to be sure you don't take too much.    For children, check the Tylenol bottle for the right dose (based on their age or weight).    3. If you have other health problems (like cancer, heart failure, an organ transplant or severe kidney disease): Call your specialty clinic if you don't feel better in the next 2 days.    4. Know when to call 911: Emergency warning signs include:    Trouble breathing or shortness of breath    Pain or pressure in the chest that doesn't go away    Feeling confused like you haven't felt before, or not being able to wake up    Bluish-colored lips or  face    5. Sign up for [employer]. We know it's scary to hear that you have COVID-19. We want to track your symptoms to make sure you're okay over the next 2 weeks. Please look for an email from [employer]--this is a free, online program that we'll use to keep in touch. To sign up, follow the link in the email. Learn more at [email or web address].    Where can I get more information?    [facility]: [email or web address]    Coronavirus Basics: www.[email or web address]./diseases/coronavirus/basics.html    What to Do If You're Sick: www.cdc.gov/coronavirus/2019-ncov/about/steps-when-sick.html    Ending Home Isolation: www.cdc.gov/coronavirus/2019-ncov/hcp/disposition-in-home-patients.html     Caring for Someone with COVID-19: www.cdc.gov/coronavirus/2019-ncov/if-you-are-sick/care-for-someone.html     [facility] clinical trials (COVID-19 research studies): [email or web address]-trials     A Positive COVID-19 letter will be sent via [employer] or the Mail.  (Exception, no letters sent to Presurgerical/Preprocedure Patients)    Ayanna Rivera LPN

## 2021-06-17 NOTE — TELEPHONE ENCOUNTER
Patient notified that Rx has been sent in. She will follow up if she feels her rash is not improving.   Kika Pollack LPN

## 2021-06-17 NOTE — TELEPHONE ENCOUNTER
I ordered Neurontin 100 mg 3 times a day as needed for nerve pain.    Orly Almanza MD 4/30/2021 12:30 PM

## 2021-06-17 NOTE — PATIENT INSTRUCTIONS - HE
"Patient Instructions by Orly Almanza MD at 7/19/2019  9:40 AM     Author: Orly Almanza MD Service: -- Author Type: Physician    Filed: 7/19/2019 11:39 AM Encounter Date: 7/19/2019 Status: Signed    : Orly Almanza MD (Physician)         Patient Education     Migraine Headache: Stages and Treatment    A migraine headache tends to progress in stages. Learning these stages can help you better understand what is happening. Then you can learn ways to reduce pain and relieve other symptoms. Methods for relieving your symptoms include self-care and medicines.  Migraine stages  Migraines tend to progress through 4 stages. Many people don't have all stages, and stages may differ with each headache:    Prodrome. A few hours to a day or so before the headache, you may feel tired, (yawning many times), uneasy, or bliss. You may also feel bloated or crave certain foods.    Aura. Up to an hour before the headache starts, some migraine sufferers experience aura--flashing lights, blind spots, other vision problems, confusion, difficulty speaking, or other neurologic symptoms.    Headache. Moderate to severe pain affects one side of the head and then can spread to both sides, often along with nausea. You may be highly sensitive to light, sound, and odors. Vomiting or diarrhea may also happen. This stage lasts 4 to 72 hours.    Postdrome. After your headache ends, you may feel tired, achy, and \"washed out.\" This may last for a day or so.  Self-care during a migraine  Here is what you can do:    Use a cold compress. Wrap a thin cloth around a cold pack, a cold can of soda, or a bag of frozen vegetables. Apply this to your temple or other pain site.    Drink fluids. If nausea makes it hard to drink, try sucking on ice.    Rest. If possible, lie down. Try not to bend over, as this may increase your pain. Sometimes laying in a dark quiet room can help the migraine from being aggravated.      Try caffeine. Some people find that " drinking fluids with caffeine, such as coffee or tea, helps to lessen migraine pain.  Using medicines  Work with your healthcare provider to find the right medicines for you. Medicines for migraine may relieve pain (analgesics), relieve nausea, or attack the migraine's root causes (migraine-specific medicines).  Rebound headache  Taking analgesics each day, or even several times a week, may lead to more frequent and severe headaches. These are called rebound headaches. If you think you're having rebound headaches, tell your healthcare provider. He or she can help you safely decrease your medicine. Rebound caffeine withdrawal headaches can also happen.    Date Last Reviewed: 10/9/2015    9457-8707 [employer]. [address] [place] [address], [place] [address]. All rights reserved. This information is not intended as a substitute for professional medical care. Always follow your healthcare professional's instructions.

## 2021-06-17 NOTE — TELEPHONE ENCOUNTER
Pt calling and requesting prescription for Gabapentin.  She was seen at  urgent care last night and was diagnosed with Shingles.  She was given an antiviral and was told to call PCP to get prescription for Gabapentin to help with pain.      Ok to leave detailed message.      Caity Etienne

## 2021-06-18 NOTE — PATIENT INSTRUCTIONS - HE
Patient Instructions by Orly Almanza MD at 8/31/2020 12:20 PM     Author: Orly Almanza MD Service: -- Author Type: Physician    Filed: 8/31/2020  2:11 PM Encounter Date: 8/31/2020 Status: Signed    : Orly Almanza MD (Physician)         Patient Education     Anxiety Reaction  Anxiety is the feeling we all get when we think something bad might happen. It is a normal response to stress and usually causes only a mild reaction. When anxiety becomes more severe, it can interfere with daily life. In some cases, you may not even be aware of what it is youre anxious about. There may also be a genetic link or it may be a learned behavior in the home.  Both psychological and physical triggers cause stress reaction. It's often a response to fear or emotional stress, real or imagined. This stress may come from home, family, work, or social relationships.  During an anxiety reaction, you may feel:    Helpless    Nervous    Depressed    Irritable  Your body may show signs of anxiety in many ways. You may experience:    Dry mouth    Shakiness    Dizziness    Weakness    Trouble breathing    Breathing fast (hyperventilating)    Chest pressure    Sweating    Headache    Nausea    Diarrhea    Tiredness    Inability to sleep    Sexual problems  Home care    Try to locate the sources of stress in your life. They may not be obvious. These may include:  ? Daily hassles of life (such as traffic jams, missed appointments, or car troubles)  ? Major life changes, both good (new baby or job promotion) and bad (loss of job or loss of loved one)  ? Overload: feeling that you have too many responsibilities and can't take care of all of them at once  ? Feeling helpless or feeling that your problems are beyond what youre able to solve    Notice how your body reacts to stress. Learn to listen to your body signals. This will help you take action before the stress becomes severe.    When you can, do something about the source of your stress.  (Avoid hassles, limit the amount of change that happens in your life at one time and take a break when you feel overloaded).    Unfortunately, many stressful situations can't be avoided. It is necessary to learn how to better manage stress. There are many proven methods that will reduce your anxiety. These include simple things like exercise, good nutrition, and adequate rest. Also, there are certain techniques that are helpful:  ? Relaxation  ? Breathing exercises  ? Visualization  ? Biofeedback  ? Meditation  For more information about this, consult your healthcare provider or go to a local bookstore and review the many books and tapes available on this subject.  Follow-up care  If you feel that your anxiety is not responding to self-help measures, contact your healthcare provider or make an appointment with a counselor. You may need short-term psychological counseling and temporary medicine to help you manage stress.  Call 911  Call 911 if any of these happen:    Trouble breathing    Confusion    Drowsiness or trouble wakening    Fainting or loss of consciousness    Rapid heart rate    Seizure    New chest pain that becomes more severe, lasts longer, or spreads into your shoulder, arm, neck, jaw, or back  When to seek medical advice  Call your healthcare provider right away if any of these happen:    Your symptoms get worse    Severe headache not relieved by rest and mild pain reliever  Date Last Reviewed: 10/1/2017    8226-5962 [employer]. [address] [place] [address], [place] [address]. All rights reserved. This information is not intended as a substitute for professional medical care. Always follow your healthcare professional's instructions.           Patient Education     Depression  Depression is one of the most common mental health problems today. It is not just a state of unhappiness or sadness. It is a true disease. The cause seems to be related to a decrease in chemicals that transmit signals  in the brain. Having a family history of depression, alcoholism, or suicide increases the risk. Chronic illness, chronic pain, migraine headaches, and high emotional stress also increase the risk.  Depression is something we tend to recognize in others, but may have a hard time seeing in ourselves. It can show in many physical and emotional ways:    Loss of appetite    Overeating    Not being able to sleep    Sleeping too much    Tiredness not related to physical exertion    Restlessness or irritability    Slowness of movement or speech    Feeling depressed or withdrawn    Loss of interest in things you once enjoyed    Trouble concentrating, poor memory, trouble making decisions    Thoughts of harming or killing oneself, or thoughts that life is not worth living    Low self-esteem  The treatment for depression may include both medicine and psychotherapy. Antidepressants can reduce suffering and can improve the ability to function during the depressed period. Therapy can offer emotional support and help you understand emotional factors that may be causing the depression.  Home care    Ongoing care and support help people manage this disease. Find a healthcare provider and therapist who meet your needs. Seek help when you feel like you may be getting ill.    Be kind to yourself. Make it a point to do things that you enjoy (gardening, walking in nature, going to a movie). Reward yourself for small successes.    Take care of your physical body. Eat a balanced diet (low in saturated fat and high in fruits and vegetables). Exercise at least 3 times a week for 30 minutes. Even mild-moderate exercise (like brisk walking) can make you feel better.    Don't drink alcohol, which can make depression worse.    Take medicine as prescribed.    Tell each of your healthcare providers about all of the prescription and over-the-counter medicines, vitamins, and supplements you take. Certain supplements interact with medicines and can  result in dangerous side effects. Ask your pharmacist when you have questions about medicine interactions.    Talk with your family and trusted friends about your feelings and thoughts. Ask them to help you recognize behavior changes early so you can get help and, if needed, medicine can be adjusted.  Follow-up care  Follow up with your healthcare provider, or as advised.  Call 911  Call 911 if you:    Have suicidal thoughts, a suicide plan, and the means to carry out the plan; or serious thoughts of hurting someone else     Have trouble breathing    Are very confused    Feel very drowsy or have trouble awakening    Faint or lose consciousness    Have new chest pain that becomes more severe, lasts longer, or spreads into your shoulder, arm, neck, jaw, or back  When to seek medical advice  Call your healthcare provider right away if any of these happen:    Feeling extreme depression, fear, anxiety, or anger toward yourself or others    Feeling out of control    Feeling that you may try to harm yourself or another    Hearing voices that others do not hear    Seeing things that others do not see    Cant sleep or eat for 3 days in a row    Friends or family express concern over your behavior and ask you to seek help  Date Last Reviewed: 10/1/2017    1096-3086 [employer]. [address] [place] [address], [place] [address]. All rights reserved. This information is not intended as a substitute for professional medical care. Always follow your healthcare professional's instructions.

## 2021-06-21 NOTE — LETTER
Letter by Orly Almanza MD at      Author: Orly Almanza MD Service: -- Author Type: --    Filed:  Encounter Date: 2/19/2021 Status: (Other)                    My Depression Action Plan  Name: Larissa Elliott   Date of Birth 1980  Date: 2/19/2021    My Doctor: Orly Almanza MD   My Clinic: [address] [address]  [phone]          GREEN    ZONE   Good Control    What it looks like:     Things are going generally well. You have normal ups and downs. You may even feel depressed from time to time, but bad moods usually last less than a day.   What you need to do:  1. Continue to care for yourself (see self care plan)  2. Check your depression survival kit and update it as needed  3. Follow your physicians recommendations including any medication.  4. Do not stop taking medication unless you consult with your physician first.           YELLOW         ZONE Getting Worse    What it looks like:     Depression is starting to interfere with your life.     It may be hard to get out of bed; you may be starting to isolate yourself from others.    Symptoms of depression are starting to last most all day and this has happened for several days.     You may have suicidal thoughts but they are not constant.   What you need to do:     1. Call your care team. Your response to treatment will improve if you keep your care team informed of your progress. Yellow periods are signs an adjustment may need to be made.     2. Continue your self-care.  Just get dressed and ready for the day.  Don't give yourself time to talk yourself out of it.    3. Talk to someone in your support network.    4. Open up your depression Depression Self-Care Plan / Wellness kit.           RED    ZONE Medical Alert - Get Help    What it looks like:     Depression is seriously interfering with your life.     You may experience these or other symptoms: You cant get out of bed most days, cant work or  engage in other necessary activities, you have trouble taking care of basic hygiene, or basic responsibilities, thoughts of suicide or death that will not go away, self-injurious behavior.     What you need to do:  1. Call your care team and request a same-day appointment. If they are not available (weekends or after hours) call your local crisis line, emergency room or 911.          Depression Self-Care Plan / Wellness Kit    Many people find that medication and therapy are helpful treatments for managing depression. In addition, making small changes to your everyday life can help to boost your mood and improve your wellbeing. Below are some tips for you to consider. Be sure to talk with your medical provider and/or behavioral health consultant if your symptoms are worsening or not improving.     Sleep  Sleep hygiene means all of the habits that support good, restful sleep. It includes maintaining a consistent bedtime and wake time, using your bedroom only for sleeping or sex, and keeping the bedroom dark and free of distractions like a computer, smartphone, or television.     Develop a Healthy Routine  Maintain good hygiene. Get out of bed in the morning, make your bed, brush your teeth, take a shower, and get dressed. Dont spend too much time viewing media that makes you feel stressed. Find time to relax each day.    Exercise  Get some form of exercise every day. This will help reduce pain and release endorphins, the feel good chemicals in your brain. It can be as simple as just going for a walk or doing some gardening, anything that will get you moving.      Diet  Strive to eat healthy foods, including fruits and vegetables. Drink plenty of water. Avoid excessive sugar, caffeine, alcohol, and other mood-altering substances.     Stay Connected with Others  Stay in touch with friends and family members.    Manage Your Mood  Try deep breathing, massage therapy, biofeedback, or meditation. Take part in fun  activities when you can. Try to find something to smile about each day.     Psychotherapy  Be open to working with a therapist if your provider recommends it.     Medication  Be sure to take your medication as prescribed. Most anti-depressants need to be taken every day. It usually takes several weeks for medications to work. Not all medicines work for all people. It is important to follow-up with your provider to make sure you have a treatment plan that is working for you. Do not stop your medication abruptly without first discussing it with your provider.    Crisis Resources   These hotlines are for both adults and children. They and are open 24 hours a day, 7 days a week unless noted otherwise.      National Suicide Prevention Lifeline   4-832-545-TALK (0007)      Crisis Text Line    www.crisistextline.org  Text HOME to 339537 from anywhere in the [place], anytime, about any type of crisis. A live, trained crisis counselor will receive the text and respond quickly.      [place] Lifeline for LGBTQ Youth  A national crisis intervention and suicide lifeline for LGBTQ youth under 25. Provides a safe place to talk without judgement. Call 1-628.196.7300; text START to 465974 or visit www.[employer].org to talk to a trained counselor.      For [facility] crisis numbers, visit the [facility] website at:  https://[place].gov/dhs/people-we-serve/adults/health-care/mental-health/resources/crisis-contacts.jsp

## 2021-06-21 NOTE — LETTER
Letter by Anna Hameed MD at      Author: Anna Hameed MD Service: -- Author Type: --    Filed:  Encounter Date: 2/11/2021 Status: (Other)       Dear Ms. Elliott,    This letter will help in preparation for your upcoming surgery. Please contact us with any additional questions you may have regarding your surgery. Contact information for your surgery scheduler:   Harriet Russell, and Aniyah: [phone] ~ Kyleigh Shah and Yomaira: [phone] ~ Merlin    You are scheduled for: Left Carpal Tunnel Release  With: Dr. Anna Hameed and Dr. Mitchell Shah  Date/Time: Friday, February 26, 2021 at 7:30 am (time subject to change)  Location: [place] [address]    Check in at the Welcome Desk inside the main doors of the hospital. An escort will take you to the surgery waiting area. A nurse from [facility] (surgery admit unit) at the hospital will call you with your exact arrival time to the hospital - typically one-and-a-half to two-and-a-half hours prior to your scheduled surgery time.     In the event of an emergency surgery case, there may be an adjustment to your start time for surgery.     PREPARING FOR YOUR SURGERY    *Pre-op Physical: Friday, February 19, 2021 at 10:05 am with Dr. Cami Villalba at the [facility]. Please have your LABS completed at this appointment as well. Orders have been placed with your primary care provider.     *Please discuss the necessity of receiving a pneumococcal vaccine prior to surgery at your pre-op physical. Recommended for all patients over the age of 65, or based on certain medical conditions.     *After the pre-op physical is complete, please have your clinic fax the visit note to your surgery scheduler at [phone].    *Pre-op Lab Work: Friday, 2/19/21 at your Pre-op physical appointment.    *COVID-19 Testing: Monday, February 22, 2021 at 4:45 pm at the [facility] lab, [address], [place].     *Readiness Visit: Dr. Hameed's nurse will call you prior to your procedure to go over your Pre-op physical and lab results, give Pre-surgery instructions and answer any additional questions you may have about your upcoming surgery.     *Ensure that you have completed your pre-op physical, along with any other necessary tests/appointments (listed above), prior to your Readiness Visit.               ADDITIONAL INFORMATION REGARDING YOUR SURGERY    Medications    Bring a list ALL of your medications, including any over-the-counter vitamins and herbal supplements to your Readiness Visit, and on the day of surgery.    DO NOT bring your medications with you the day of surgery.    Please see attached third sheet for more details on medications/vitamins/herbal supplements that should be discontinued prior to your surgery date.     If you are unsure if you should discontinue a medication/ vitamin/herbal supplement, please call our office and discuss with a nurse.    Continue taking your medications/vitamins/herbal supplements unless they are on the attached list.     Failure to follow the instructions regarding medications/vitamins/herbal supplements will result in cancellation of your surgery.    Day BEFORE Surgery    DO NOT shave near your surgical site. This can cause irritation of the skin    Using a washcloth and provided bottle of Hibiclens, shower the night BEFORE surgery, using a half bottle of Hibiclens to wash your body, avoiding face and genitals. The morning OF surgery, shower and use the second half of the bottle to wash your body, avoiding face and genitals. If you are unable to take a shower the morning of surgery, please discuss your options with the nurse at your readiness visit.     NOTHING  to eat after 11:00 p.m. the night prior to your procedure    CLEAR LIQUIDS: May have the following liquids up to two (2) hours before your arrival time at the hospital: water, plain  black coffee (no cream or milk), plain black tea or plain green tea (no cream or milk), Gatorade or Propel Water.    SMOKING: Stop smoking as far before surgery as possible, or as directed by your surgeon. NO tobacco products of any kind (cigarettes, e-cigarettes, chewing tobacco) beginning at 11:00 p.m. the night prior to your procedure.     ALCOHOL: You should stop drinking alcohol beginning at 11:00 p.m. the night prior to your procedure    Contact our office if you have symptoms of illness such as a fever of 101 or greater, chills, cough, sore throat, or if you develop a rash or any open sore    Day OF Surgery    If youve been instructed to take a medication(s) on the morning of surgery, please take with a very small sip of water.    Wear loose & comfortable clothing and flat shoes, Leave jewelry/valuables at home. If you wear contact lenses, remove them at home and wear glasses. Remove any body piercings. Remove nail polish.     Planning for Discharge    Start planning for your care after discharge as soon as you receive this letter.    If you have not made arrangements to have someone take you home and stay with you for the first 48 hours after discharge, your surgery will be cancelled.        PRE-OPERATIVE MEDICATION INSTRUCTIONS  Review this information with your primary care physician prior to discontinuing any of the medications listed below.  Notify your primary care physician that you have been instructed to discontinue these medications.    TEN (10) Days Prior to Surgery, STOP the Following Medications   [place]  Anacin  Aspirin  Excedrin  Pepto Bismol    **Before taking ANY over-the-counter medications, check the label for Aspirin   Non-steroidal   Anti-inflammatory Medications (NSAIDS)    Celebrex  Diclofenac (Cataflam)  Etodolac (Iodine)  Fenoprofen (Nalfon)  Ibuprofen (Advil, Motrin, Nuprin)  Indomethacin (Indocin)  Ketoprofen  Ketorolac (Toradol)  Melaxicam (Mobic)  Naproxen (AnaProx, Aleve,  Naprosyn)  Relafen (Nabumetone)   Herbal Supplements (this is a partial list of herbals to be discontinued)    Irene Willoughby Qulidia  Ephedra  Feverfew  Fish Oil  Flaxseed Oil  Garlic  Gissell  Gingko  Ginseng  Goldenseal  Imitrex (Sumatriptan)  Kava  Krill Oil  Licorice  Multi Vitamins  [facility]  Valerian  Vitamin E  Yohimbe   CHECK WITH YOUR PRESCRIBING DOCTOR BEFORE STOPPING ANY BLOOD THINNERS (approximately 7 days prior to surgery)  (Coumadin, Plavix, Platel, Aggrenox, Effient (Prasugrel), Ticlid), Xarelto, and Pradaxa      ALWAYS CHECK WITH YOUR PRESCRIBING DOCTOR REGARDING THE MEDICATIONS LISTED BELOW; RECOMMENDED STOP TIME IS ALSO LISTED      If you are taking Lovenox, discontinue 24 HOURS prior to surgery    If you are taking weight loss medication, discontinue 7 days prior to surgery    If you are taking Metformin or Simvastatin, check with your primary care physician (or whoever has prescribed you this medication) regarding when to discontinue prior to surgery

## 2021-06-22 NOTE — PROGRESS NOTES
ASSESSMENT and PLAN:  1. Migraine  This continues to work well for her for migraine treatment.  Refill provided.  - rizatriptan (MAXALT) 10 MG tablet; Take 1 tablet (10 mg total) by mouth as needed for migraine. May repeat in 2 hours if needed  Dispense: 10 tablet; Refill: 5    2. Routine general medical examination at a health care facility  She is up-to-date on age-appropriate cancer screenings and immunizations.  She was given paperwork about getting an advanced care directive done.  - Lipid Cascade; Future  - Comprehensive Metabolic Panel; Future    3. Dyspepsia  Stable.  She has seen GI for this.  She will intermittently use a PPI as needed.    4. PCOS (polycystic ovarian syndrome)  She would like a refill of spironolactone.  She found that helpful.  She will have blood work done tomorrow to check her potassium and kidney function.  She has been on this in the past without issue.  - spironolactone (ALDACTONE) 50 MG tablet; Take 1 tablet (50 mg total) by mouth daily.  Dispense: 90 tablet; Refill: 3  - Comprehensive Metabolic Panel; Future  - Thyroid Stimulating Hormone (TSH); Future    5. Other decreased white blood cell (WBC) count  Headache.  At one point, she did see hematology.  Nothing worrisome was found.  I will repeat her blood work today.  - HM1(CBC and Differential); Future        Patient Instructions   Labs will be available on [employer].  If you aren't signed up, then we'll mail them out.  If anything needs more immediate follow up, we'll also call.    I'll send in the spironolactone and refill of maxalt.    Take care!        Orders Placed This Encounter   Procedures     Lipid Cascade     Standing Status:   Future     Standing Expiration Date:   1/8/2020     Order Specific Question:   Fasting is required?     Answer:   Yes     Comprehensive Metabolic Panel     Standing Status:   Future     Standing Expiration Date:   1/8/2020     Thyroid Stimulating Hormone (TSH)     Standing Status:   Future      Standing Expiration Date:   1/8/2020     Medications Discontinued During This Encounter   Medication Reason     rizatriptan (MAXALT) 10 MG tablet Reorder     omeprazole (PRILOSEC) 20 MG capsule Therapy completed     azelastine (ASTELIN) 137 mcg (0.1 %) nasal spray Therapy completed       No Follow-up on file.    ASSESSED PROBLEMS:  Problem List Items Addressed This Visit     Dyspepsia      Other Visit Diagnoses     Routine general medical examination at a health care facility    -  Primary    Relevant Orders    Lipid Cascade    Comprehensive Metabolic Panel    Migraine        Relevant Medications    rizatriptan (MAXALT) 10 MG tablet    PCOS (polycystic ovarian syndrome)        Relevant Medications    spironolactone (ALDACTONE) 50 MG tablet    Other Relevant Orders    Comprehensive Metabolic Panel    Thyroid Stimulating Hormone (TSH)    Other decreased white blood cell (WBC) count        Relevant Orders    HM1(CBC and Differential)          CHIEF COMPLAINT:  Chief Complaint   Patient presents with     Annual Exam     Not fasting -  No concerns       HISTORY OF PRESENT ILLNESS:  Larissa Elliott is a 38 y.o. female is presenting to the clinic today for an annual exam.     In May, she saw GI for increased reflux sx.  She improved w/ PPI and longterm therapy was discussed.  Her symptoms improved after 3 months of therapy.  Incidentally, her white count was found to be low when she had blood work done there.  She has a history of that.    She will be seeing Dr. Dunbar on Thursday of this week for a breast and pelvic exam.    She is not fasting today but can get fasting labs done tomorrow at [facility].      Health Maintenance:  She has her eyes examined regularly and visits the dentist on a regular basis.   Mammogram: 3/13/18, rec to f/u at age 40  Colonoscopy: n/a  Pap Smear: gyn, sees Dr. Dunbar on Thursday of this week    REVIEW OF SYSTEMS:   All systems are negative.    PFSH:  Past Medical History:   Diagnosis Date      "Abnormal Pap smear of cervix     Was told Precancerous cells & HPV     Female infertility      PCOS (polycystic ovarian syndrome)      Past Surgical History:   Procedure Laterality Date     back surgery Bilateral 2003    Disc ectomy L5/S1     BACK SURGERY Left 2003    L5 S1     ENDOMETRIAL ABLATION       EXPLORATORY LAPAROTOMY       GYNECOLOGIC CRYOSURGERY       LUMBAR DISCECTOMY Left 1/30/2015    Procedure: LEFT LUMBAR 4-5 MICRODISCECTOMY AND REVISION LEFT LUMBAR 5-SACRAL 1 MICRODISCECTOMY;  Surgeon: Mick Bolton MD;  Location: [facility] OR;  Service:      LUMBAR DISCECTOMY Left 1/30/2015    Procedure: LEFT L4-5 MICRODISCECTOMY AND REVISION LEFT L5-S1 MICRODISCECTOMY;  Surgeon: Mick Bolton MD;  Location: [facility] Main OR;  Service:      Family History   Problem Relation Age of Onset     Asthma Father      Asthma Brother      Breast cancer Maternal Grandmother         Premenopausal     COPD Paternal Grandfather      Social History     Socioeconomic History     Marital status:      Spouse name: Not on file     Number of children: Not on file     Years of education: Not on file     Highest education level: Not on file   Social Needs     Financial resource strain: Not on file     Food insecurity - worry: Not on file     Food insecurity - inability: Not on file     Transportation needs - medical: Not on file     Transportation needs - non-medical: Not on file   Occupational History     Not on file   Tobacco Use     Smoking status: Former Smoker     Smokeless tobacco: Never Used   Substance and Sexual Activity     Alcohol use: No     Drug use: No     Sexual activity: Yes     Partners: Male   Other Topics Concern     Not on file   Social History Narrative     Not on file       VITALS:  Vitals:    01/08/19 0919   BP: 106/72   Patient Site: Right Arm   Patient Position: Sitting   Cuff Size: Adult Large   Pulse: 72   Weight: 170 lb 4.8 oz (77.2 kg)   Height: 5' 6.5\" (1.689 m)     Wt " Readings from Last 3 Encounters:   01/08/19 170 lb 4.8 oz (77.2 kg)   02/13/18 165 lb (74.8 kg)   09/14/17 159 lb (72.1 kg)       PHYSICAL EXAM:  Constitutional:  Reveals an alert, pleasant adult female.   Vitals:  Noted.   Head: Normocephalic, without obvious abnormality, atraumatic   Ears: TM's normal bilaterally   Eyes: PERRL, conjunctiva/corneas clear, EOM's intact   Throat: Lips, mucosa, and tongue normal; teeth and gums normal   Neck: Normal ROM, no carotid bruits, no thyromegaly   Lungs: Clear to auscultation bilaterally, respirations unlabored.   Breast exam:  Declined by pt; seeing gyn on Thursday of this week  Heart: Regular rate and rhythm, S1 and S2 normal, no murmur, rub, or gallop,   Abdomen: Soft, non-tender, bowel sounds active all four quadrants, no masses, no organomegaly   Extremities: Extremities normal, atraumatic, no cyanosis or edema   Pelvic:Not examined  Skin: Skin color, texture, turgor normal, no rashes or lesions   Neurologic: Normal     MEDICATIONS:  Current Outpatient Medications   Medication Sig Dispense Refill     levonorgestrel (MIRENA) 20 mcg/24 hr (5 years) IUD        pantoprazole (PROTONIX) 40 MG tablet Take 40 mg by mouth 2 (two) times a day.  12     tretinoin (RETIN-A) 0.025 % cream   6     rizatriptan (MAXALT) 10 MG tablet Take 1 tablet (10 mg total) by mouth as needed for migraine. May repeat in 2 hours if needed 10 tablet 5     spironolactone (ALDACTONE) 50 MG tablet Take 1 tablet (50 mg total) by mouth daily. 90 tablet 3     No current facility-administered medications for this visit.

## 2021-06-22 NOTE — PATIENT INSTRUCTIONS - HE
Labs will be available on [employer].  If you aren't signed up, then we'll mail them out.  If anything needs more immediate follow up, we'll also call.    I'll send in the spironolactone and refill of maxalt.    Take care!

## 2021-06-24 ENCOUNTER — COMMUNICATION - HEALTHEAST (OUTPATIENT)
Dept: CARDIOLOGY | Facility: CLINIC | Age: 41
End: 2021-06-24
Payer: COMMERCIAL

## 2021-06-25 NOTE — PROGRESS NOTES
Progress Notes by Angelic Costa MD at 9/14/2017 10:30 AM     Author: Angelic Costa MD Service: -- Author Type: Physician    Filed: 9/18/2017  2:56 PM Encounter Date: 9/14/2017 Status: Signed    : Angelic Costa MD (Physician)           Click to link to [facility] HEART CARE NOTE    Thank you, Dr. Pimentel, for asking us to see Larissa Elliott at the [facility].      Assessment/Recommendations   Assessment:    This is a 37 year old woman who is here in cardiac clinic for an initial consultation for complaints of palpitations that feels like a fluttering in her chest. At times this occurs with loss of breath.  No chest pain or presyncopal symptoms.  She had a negative workup in 2012 in [place] with a normal echocardiogram and holter monitor.      Plan:  1. Given the worsening symptoms will proceed with a 24 hour holter monitor to reevaluate.  2. Check hemoglobin, BMP and TSH.  3.  Echocardiogram showed no evidence of structural heart disease.          History of Present Illness    Ms. Larissa Elliott is a 37 y.o. female with history of palpitations with a normal cardiac workup in the past who is here for an initial cardiac consultation.  She moved here from [place] a few years ago.  In 2012 she had been experiencing a fluttering sensation in her chest that felt like there was a butterfly in her chest.  She would feel it with exercise and at times it felt like it was taking her breath away.   No chest discomfort.   Her echocardiogram demonstrated an LVEF of 65% with no significant valvular disease.  Her 24 hour holter monitor demonstrated sinus tachycardia during her episode of fluttering and otherwise rare ectopy.  She reports that her symptoms improved and then she moved here to [place].  Recently she has been experiencing recurrent symptoms and it is occurring daily.  No chest pain.  No light headedness or syncope.        Physical Examination  Review of Systems   Vitals:    09/14/17 1027   BP: 120/66   Pulse: 84   Resp: 20     Body mass index is 26.06 kg/(m^2).  Wt Readings from Last 3 Encounters:   09/14/17 159 lb (72.1 kg)   04/04/17 160 lb 5 oz (72.7 kg)   01/19/16 162 lb 11.2 oz (73.8 kg)       General Appearance:   alert, no apparent distress   HEENT:  no scleral icterus; the mucous membranes are pink and moist                                  Neck: jugular venous pressure normal   Chest: the spine is straight and the chest is symmetric   Lungs:   respirations unlabored; the lungs are clear to auscultation   Cardiovascular:   regular rhythm with normal first and second heart sounds and no murmurs or gallops; carotid pulses are intact and there are no carotid bruits.   Abdomen:  no organomegaly, masses, bruits, or tenderness; bowel sounds are present   Extremities: no cyanosis, clubbing, or edema   Skin: no xanthelasma    General: Night Sweats  Eyes: WNL  Ears/Nose/Throat: WNL  Lungs: Cough  Heart: Shortness of Breath with activity (palpitations)  Stomach: Heartburn  Bladder: WNL  Muscle/Joints: WNL  Skin: WNL  Nervous System: WNL  Mental Health: WNL     Blood: WNL     Medical History  Surgical History Family History Social History   Past Medical History:   Diagnosis Date   ? Abnormal Pap smear of cervix    ? Female infertility    ? PCOS (polycystic ovarian syndrome)     Past Surgical History:   Procedure Laterality Date   ? back surgery Bilateral 2003    Disc ectomy L5/S1   ? BACK SURGERY Left 2003    L5 S1   ? ENDOMETRIAL ABLATION     ? EXPLORATORY LAPAROTOMY     ? GYNECOLOGIC CRYOSURGERY     ? LUMBAR DISCECTOMY Left 1/30/2015    Procedure: LEFT LUMBAR 4-5 MICRODISCECTOMY AND REVISION LEFT LUMBAR 5-SACRAL 1 MICRODISCECTOMY;  Surgeon: Mick Bolton MD;  Location: [facility];  Service:    ? LUMBAR DISCECTOMY Left 1/30/2015    Procedure: LEFT L4-5 MICRODISCECTOMY AND REVISION LEFT L5-S1 MICRODISCECTOMY;  Surgeon: Mick Davis  MD Hoang;  Location: [facility] Main OR;  Service:     Family History   Problem Relation Age of Onset   ? Asthma Father    ? Asthma Brother    ? Breast cancer Maternal Grandmother    ? COPD Paternal Grandfather     Social History     Social History   ? Marital status:      Spouse name: N/A   ? Number of children: N/A   ? Years of education: N/A     Occupational History   ? Not on file.     Social History Main Topics   ? Smoking status: Former Smoker   ? Smokeless tobacco: Never Used   ? Alcohol use No   ? Drug use: No   ? Sexual activity: Yes     Partners: Male     Other Topics Concern   ? Not on file     Social History Narrative          Medications  Allergies   Current Outpatient Prescriptions   Medication Sig Dispense Refill   ? azelastine (ASTELIN) 137 mcg (0.1 %) nasal spray 2 sprays into each nostril 2 (two) times a day. Use in each nostril as directed 30 mL 11   ? omeprazole (PRILOSEC) 20 MG capsule Take 20 mg by mouth 2 (two) times a day before meals.     ? rizatriptan (MAXALT) 10 MG tablet Take 1 tablet (10 mg total) by mouth as needed for migraine. May repeat in 2 hours if needed 10 tablet 5     No current facility-administered medications for this visit.       Allergies   Allergen Reactions   ? Latex Rash         Lab Results    Chemistry/lipid CBC Cardiac Enzymes/BNP/TSH/INR   Lab Results   Component Value Date    CREATININE 0.88 09/14/2017    BUN 15 09/14/2017    K 5.1 (H) 09/14/2017     09/14/2017     09/14/2017    CO2 26 09/14/2017    Lab Results   Component Value Date    WBC 4.6 09/14/2017    HGB 14.5 09/14/2017    HCT 42.5 09/14/2017    MCV 93 09/14/2017     09/14/2017    Lab Results   Component Value Date    TSH 1.37 09/14/2017

## 2021-06-25 NOTE — PROGRESS NOTES
[address] [address]  Dept: [phone]  Dept Fax: [phone]  Primary Provider: Orly Almanza MD  Pre-op Performing Provider: ORLY ALMANZA      PREOPERATIVE EVALUATION:  Today's date: 5/27/2021    Larissa Elliott is a 41 y.o. female who presents for a preoperative evaluation.    Surgical Information:  Surgery/Procedure: L4/5 redo microdiscectomy  Surgery Location: [facility]  Surgeon: Dr. Bolton  Surgery Date: 6/3/21  Time of Surgery: Unknown  Where patient plans to recover: At home with family  Fax number for surgical facility: Note does not need to be faxed, will be available electronically in Epic.    Type of Anesthesia Anticipated: Local with MAC    Assessment & Plan      The proposed surgical procedure is considered LOW risk.    Larissa was seen today for pre-op exam.    Diagnoses and all orders for this visit:    Preop examination  -     HM2(CBC w/o Differential)  -     INR  -     Basic Metabolic Panel  -     Asymptomatic COVID-19 Virus (CORONAVIRUS) PCR; Future    Foraminal stenosis of lumbar region    Lumbar radiculopathy    Mild major depression (H)    Need for shingles vaccine    Other orders  -     Varicella Zoster, Recombinant Vaccine IM           Risks and Recommendations:  The patient has the following additional risks and recommendations for perioperative complications:   - No identified additional risk factors other than previously addressed    Medication Instructions:  Patient is to take all scheduled medications on the day of surgery    RECOMMENDATION:  APPROVAL GIVEN to proceed with proposed procedure, without further diagnostic evaluation.    Review of external notes as documented above   Subjective     HPI related to upcoming procedure: redoing her L4-L5 microdiscectomy ( had already done 2 times in the past) , having lot of radicular pain on right side.     Preop Questions 5/27/2021   Have you ever had a heart attack or stroke? No    Have you ever had surgery on your heart or blood vessels, such as a stent placement, a coronary artery bypass, or surgery on an artery in your head, neck, heart, or legs? No   Do you have chest pain with activity? No   Do you have a history of  heart failure? No   Do you currently have a cold, bronchitis or symptoms of other infection? No   Do you have a cough, shortness of breath, or wheezing? No   Do you or anyone in your family have previous history of blood clots? No   Do you or does anyone in your family have a serious bleeding problem such as prolonged bleeding following surgeries or cuts? No   Have you ever had problems with anemia or been told to take iron pills? No   Have you had any abnormal blood loss such as black, tarry or bloody stools, or abnormal vaginal bleeding? No   Have you ever had a blood transfusion? No   Are you willing to have a blood transfusion if it is medically needed before, during, or after your surgery? Yes   Have you or any of your relatives ever had problems with anesthesia? No   Do you have sleep apnea, excessive snoring or daytime drowsiness? No   Do you have any artifical heart valves or other implanted medical devices like a pacemaker, defibrillator, or continuous glucose monitor? No   Do you have artificial joints? No   Are you allergic to latex? Yes -    Is there any chance that you may be pregnant? No         Health Care Directive:  Patient does not have a Health Care Directive or Living Will: Discussed advance care planning with patient; information given to patient to review.    Preoperative Review of :    reviewed - no record of controlled substances prescribed.    See problem list for active medical problems.  Problems all longstanding and stable, except as noted/documented.  See ROS for pertinent symptoms related to these conditions.      Review of Systems  Constitutional, neuro, ENT, endocrine, pulmonary, cardiac, gastrointestinal, genitourinary,  musculoskeletal, integument and psychiatric systems are negative, except as otherwise noted.      Patient Active Problem List    Diagnosis Date Noted     Mild major depression (H) 05/27/2021     Carpal tunnel syndrome of left wrist 01/29/2021     Foraminal stenosis of lumbar region 11/25/2020     Intractable periodic headache syndrome 02/26/2020     PCOS (polycystic ovarian syndrome) 07/19/2019     Dietary counseling and surveillance 05/31/2018     Symptomatic varicose veins, bilateral 09/15/2015     Dyspepsia      Past Medical History:   Diagnosis Date     Abnormal Pap smear of cervix     Was told Precancerous cells & HPV     History of anesthesia complications     slow wake up     Moderate major depression (H) 2/19/2021     PCOS (polycystic ovarian syndrome)      Past Surgical History:   Procedure Laterality Date     back surgery Bilateral 2003    Disc ectomy L5/S1     BACK SURGERY Left 2003    L5 S1     ENDOMETRIAL ABLATION       EXPLORATORY LAPAROTOMY       GYNECOLOGIC CRYOSURGERY       LUMBAR DISCECTOMY Left 1/30/2015    Procedure: LEFT LUMBAR 4-5 MICRODISCECTOMY AND REVISION LEFT LUMBAR 5-SACRAL 1 MICRODISCECTOMY;  Surgeon: Mick Bolton MD;  Location: [facility] OR;  Service:      LUMBAR DISCECTOMY Left 1/30/2015    Procedure: LEFT L4-5 MICRODISCECTOMY AND REVISION LEFT L5-S1 MICRODISCECTOMY;  Surgeon: Mick Bolton MD;  Location: [facility] Main OR;  Service:      VT LAMNOTMY INCL W/DCMPRSN NRV ROOT 1 INTRSPC LUMBR Right 3/5/2020    Procedure: MICRODISCECTOMY RIGHT LUMBAR 4-5 LATERAL RECESS DECOMPRESSION RIGHT LUMBAR 4-5;  Surgeon: Mick Bolton MD;  Location: [facility] Main OR;  Service: Spine     VT REVISE MEDIAN N/CARPAL TUNNEL SURG Left 2/26/2021    Procedure: LEFT CARPAL TUNNEL RELEASE;  Surgeon: Anna Hameed MD;  Location: [facility] Main OR;  Service: Neurosurgery     SPINE SURGERY       Current Outpatient Medications   Medication Sig Dispense Refill  "    cholecalciferol, vitamin D3, 1,000 unit (25 mcg) tablet Take 2,000 Units by mouth daily.       escitalopram oxalate (LEXAPRO) 10 MG tablet TAKE 1 TABLET(10 MG) BY MOUTH DAILY FOR 2 WEEKS, THEN INCREASE TO 1 1/2 TABLETS(15MG) DAILY (Patient taking differently: Take 5 mg by mouth daily. ) 135 tablet 3     gabapentin (NEURONTIN) 100 MG capsule Take 100 mg by mouth 3 (three) times a day. 90 capsule 2     levonorgestrel (MIRENA) 20 mcg/24 hr (5 years) IUD 1 each by Intrauterine route.        multivitamin with minerals (THERA-M) 9 mg iron-400 mcg Tab tablet Take 1 tablet by mouth daily.       spironolactone (ALDACTONE) 50 MG tablet TAKE 1 TABLET BY MOUTH ONCE DAILY 90 tablet 3     SUMAtriptan (IMITREX) 25 MG tablet Take 25 mg by mouth as needed for migraine.       topiramate (TOPAMAX) 25 MG tablet Take 25 mg by mouth daily.       No current facility-administered medications for this visit.        Allergies   Allergen Reactions     Latex Rash       Social History     Tobacco Use     Smoking status: Former Smoker     Smokeless tobacco: Never Used   Substance Use Topics     Alcohol use: No      Family History   Problem Relation Age of Onset     Asthma Father      Sarcoidosis Father      Migraines Father      Asthma Brother      Breast cancer Maternal Grandmother         Premenopausal     COPD Paternal Grandfather      Psoriasis Mother      Social History     Substance and Sexual Activity   Drug Use No        Objective     /62 (Patient Site: Left Arm, Patient Position: Sitting, Cuff Size: Adult Regular)   Pulse 87   Temp 98.6  F (37  C)   Ht 5' 6\" (1.676 m)   Wt 178 lb 8 oz (81 kg)   SpO2 98%   BMI 28.81 kg/m    Physical Exam    GENERAL APPEARANCE: healthy, alert and no distress     EYES: EOMI, PERRL     HENT: ear canals and TM's normal and nose and mouth without ulcers or lesions     NECK: no adenopathy, no asymmetry, masses, or scars and thyroid normal to palpation     RESP: lungs clear to auscultation - no " rales, rhonchi or wheezes     CV: regular rates and rhythm, normal S1 S2, no S3 or S4 and no murmur, click or rub     ABDOMEN:  soft, nontender, no HSM or masses and bowel sounds normal     MS: extremities normal- no gross deformities noted, no evidence of inflammation in joints, FROM in all extremities.     SKIN: no suspicious lesions or rashes     NEURO: Normal strength and tone, sensory exam grossly normal, mentation intact and speech normal     PSYCH: mentation appears normal. and affect normal/bright     LYMPHATICS: No cervical adenopathy    Recent Labs   Lab Test 04/20/21  1039 02/19/21  1017   HGB 13.4 13.6    251   INR  --  0.99    139   K 4.6 4.2   CREATININE 0.86 0.77        PRE-OP Diagnostics:   Labs pending at this time. Results will be reviewed when available.  No EKG required, no history of coronary heart disease, significant arrhythmia, peripheral arterial disease or other structural heart disease.    REVISED CARDIAC RISK INDEX (RCRI)   The patient has the following serious cardiovascular risks for perioperative complications:   - No serious cardiac risks = 0 points    RCRI INTERPRETATION: 0 points: Class I (very low risk - 0.4% complication rate)         Signed Electronically by: Orly Almanza MD    Copy of this evaluation report is provided to requesting physician.

## 2021-06-26 NOTE — ANESTHESIA POSTPROCEDURE EVALUATION
Patient: Larissa Elliott  Procedure(s):  RIGHT LUMBAR 4-5 REVISION MICRODISCECTOMY (Right)  Anesthesia type: general    Patient location: Phase II Recovery  Last vitals:   Vitals Value Taken Time   /59 06/03/21 1020   Temp  06/03/21 1104   Pulse 72 06/03/21 1020   Resp 14 06/03/21 1020   SpO2 96 % 06/03/21 1020     Post vital signs: stable  Level of consciousness: awake and responds to simple questions  Post-anesthesia pain: pain controlled  Post-anesthesia nausea and vomiting: no  Pulmonary: unassisted, return to baseline  Cardiovascular: stable and blood pressure at baseline  Hydration: adequate  Anesthetic events: no    QCDR Measures:  ASA# 11 - Natali-op Cardiac Arrest: ASA11B - Patient did NOT experience unanticipated cardiac arrest  ASA# 12 - Natali-op Mortality Rate: ASA12B - Patient did NOT die  ASA# 13 - PACU Re-Intubation Rate: ASA13B - Patient did NOT require a new airway mgmt  ASA# 10 - Composite Anes Safety: ASA10A - No serious adverse event    Additional Notes:

## 2021-06-26 NOTE — ANESTHESIA PREPROCEDURE EVALUATION
Anesthesia Evaluation      Patient summary reviewed   History of anesthetic complications     Airway   Mallampati: I  Neck ROM: full   Pulmonary - normal exam    breath sounds clear to auscultation                         Cardiovascular - negative ROS and normal exam  Exercise tolerance: > or = 4 METS  Rate: normal,         Neuro/Psych    (+) neuromuscular disease,  depression,     Endo/Other - negative ROS      GI/Hepatic/Renal - negative ROS      Other findings: PONV history      Dental - normal exam                        Anesthesia Plan  Planned anesthetic: general endotracheal  Plan full TIVA with propofol gtt.  Mg, ketamine as adjuncts.  PONV prophylaxis with scop patch, propofol gtt, decadron, zofran.  ASA 2   Induction: intravenous   Anesthetic plan and risks discussed with: patient and spouse    Post-op plan: routine recovery

## 2021-06-26 NOTE — ANESTHESIA CARE TRANSFER NOTE
Last vitals:   Vitals:    06/03/21 0910   BP: 108/64   Pulse: 77   Resp: 14   Temp: 36.2  C (97.1  F)   SpO2: 100%     Patient's level of consciousness is drowsy  Spontaneous respirations: yes  Maintains airway independently: yes  Dentition unchanged: yes  Oropharynx: oropharynx clear of all foreign objects    QCDR Measures:  ASA# 20 - Surgical Safety Checklist: WHO surgical safety checklist completed prior to induction    PQRS# 430 - Adult PONV Prevention: 4558F - Pt received => 2 anti-emetic agents (different classes) preop & intraop  ASA# 8 - Peds PONV Prevention: NA - Not pediatric patient, not GA or 2 or more risk factors NOT present  PQRS# 424 - Natali-op Temp Management: 4559F - At least one body temp DOCUMENTED => 35.5C or 95.9F within required timeframe  PQRS# 426 - PACU Transfer Protocol: - Transfer of care checklist used  ASA# 14 - Acute Post-op Pain: ASA14B - Patient did NOT experience pain >= 7 out of 10

## 2021-06-26 NOTE — PATIENT INSTRUCTIONS - HE
Preparing for Surgery    What you should do:    If you smoke, quit. Smokers may be up to twice as likely to experience complications after surgery such as pneumonia and respiratory distress.  A week of not smoking before surgery can   help you recover faster    If you have a Health Care Directive, please bring a signed copy to the hospital.    Follow the fasting instructions given to you by your surgeon    Make plans for support when you return home from the hospital (who will help you with household chores and driving while you recover?)    Your medicine plan:     Stop all vitamins, mineral and other supplements 1 week before surgery.    Take all other regular medicines with a little water the morning of your surgery or procedure     Do not take any aspirin, AdvilÆ (ibuprofen), or AleveÆ (naproxen) for 7 days before your surgery.  These can increase the risk of bleeding complications during and after your surgery    It is OK to take Tylenol  (acetaminophen) within 7 days of surgery.    Please leave your medicines at home. The hospital will give you any medicines you may need during your hospital stay.    Who should you see if the after hospital plan is not working?    Call your surgeon for any of the following:  o Your incision has any signs of separation  o Signs of infection (increasing redness, swelling, tenderness, warmth, change in appearance, or increased drainage)  o Temperature greater than 101 degrees Fahrenheit  o Drainage from your incision that is green, creamy, sticky, or if it lasts for more than 2 weeks  o Severe pain that is not relieved by medicine, rest or ice    Call your primary care clinic for any of the following:  o If you gain more than three pounds in one day or five pounds in one week  o For trouble breathing more than usual  o Have severe constipation, diarrhea or nausea  o Nausea (upset stomach) and vomiting and/or diarrhea that will not stop  o Blood in your urine or stool    Call [phone]  if you feel you are having a medical emergency    When should you be seen again?    Your surgeon will let you know when to be seen for surgical follow-up       If you stay overnight in the hospital, you may also be scheduled to see your primary care provider within 5 days of going home from the hospital to make sure medicines are not causing problems and you understand how to care for yourself.        Orly Almanza MD 5/27/2021 2:52 PM

## 2021-06-27 NOTE — PROGRESS NOTES
Progress Notes by Orly Almanza MD at 7/19/2019  9:40 AM     Author: Orly Almanza MD Service: -- Author Type: Physician    Filed: 7/19/2019 11:39 AM Encounter Date: 7/19/2019 Status: Signed    : Orly Almanza MD (Physician)       FEMALE PREVENTATIVE EXAM    Assessment and Plan:       Larissa was seen today for establish care, annual exam and headache.    Routine general medical examination at a health care facility    PCOS (polycystic ovarian syndrome)  -     Glycosylated Hemoglobin A1c  -     Thyroid Stimulating Hormone (TSH)    Screening for metabolic disorder  -     Glycosylated Hemoglobin A1c  -     Lipid Cascade    Screening for cervical cancer  Comments:  pap was done at [facility] feb 2019 was .wnl will do BUD    Migraine with aura and without status migrainosus, not intractable  -     Ambulatory referral to Neurology    We will follow-up after neuro evaluation for her migraine headaches.  Preventive medication discussed with the patient including Topamax and Inderal which we like to wait till she has a complete evaluation done  She is currently on spironolactone 50 mg dose which she is very happy with helping with her polycystic ovarian syndrome symptoms  She will reach out to us when she needs refill on her medication  We will follow-up on the lab with change in treatment plan based on the result    Next follow up:  1 yr for annual physical    Immunization Reviewed and if needed ordered please see A/P    There are no preventive care reminders to display for this patient.    Immunization History   Administered Date(s) Administered   ? Influenza, inj, historic,unspecified 09/01/2014, 10/05/2017, 10/05/2017   ? Influenza,seasonal quad, PF 01/24/2014   ? Td,adult,historic,unspecified 07/07/2014     I discussed the following with the patient:   Adult Healthy Living: Importance of regular exercise  Healthy nutrition  Getting adequate sleep  Stress management  Supplement use  Herbal medications/alternative  medical therapies    The following high BMI interventions were performed this visit: dietary management education, guidance, and counseling    I have had an Advance Directives discussion with the patient.    Subjective:   Chief Complaint: Larissa Elliott is an 39 y.o. female here for a preventative health visit.     HPI:     Migraine headache  Larissa Elliott is a 39 y.o. 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 Home treatment has included takes Excedrin Migraine right away and then take Imitrex oral ( but now have to take 20 mg ) with some improvement. Headaches are occurring once per week. Generally, the headaches last about several hours. Work attendance or other daily activities are affected by the headaches. The patient denies depression, dizziness, loss of balance, muscle weakness, numbness of extremities, speech difficulties, vision problems and vomiting in the early morning.      No LMP recorded (lmp unknown). Patient has had an implant.       Healthy Habits  Are you taking a daily aspirin? No  Do you typically exercising at least 40 min, 3-4 times per week?  Yes  Do you usually eat at least 4 servings of fruit and vegetables a day, include whole grains and fiber and avoid regularly eating high fat foods? Yes  Have you had an eye exam in the past two years? Yes  Do you see a dentist twice per year? Yes  Do you have any concerns regarding sleep? No    Safety Screen  If you own firearms, are they secured in a locked gun cabinet or with trigger locks? Yes    Do you feel you are safe where you are living?: Yes (7/19/2019  9:50 AM)  Do you feel you are safe  "in your relationship(s)?: Yes (7/19/2019  9:50 AM)      Review of Systems:  Please see above.  The rest of the review of systems are negative for all systems.     Pap History:   Yes - updated in Problem List and Health Maintenance accordingly    Cancer Screening       Status Date      PAP SMEAR Next Due 9/10/2020      Done 9/10/2015 Pap scanned under Media tab          Patient Care Team:  Orly Almanza MD as PCP - General (Family Medicine)        History     Reviewed By Date/Time Sections Reviewed    Jacque Zhao CMA 7/19/2019 10:38 AM Family, Social Documentation    Orly Almanza MD 7/19/2019 10:15 AM Medical, Surgical    Jacque Zhao [facility] 7/19/2019  9:51 AM Tobacco            Objective:   Vital Signs:   Visit Vitals  /68 (Patient Site: Left Arm, Patient Position: Sitting, Cuff Size: Adult Regular)   Pulse 88   Ht 5' 5.75\" (1.67 m)   Wt 161 lb 8 oz (73.3 kg)   LMP  (LMP Unknown) Comment: Mirena   Breastfeeding? No   BMI 26.27 kg/m         PHYSICAL EXAM  Physical Exam:  General Appearance:  Appears comfortable, Alert, cooperative, no distress,   Head: Normocephalic, without obvious abnormality, atraumatic  Eyes: PERRL, conjunctiva/corneas clear, EOM's intact, both eyes             Nose: Nares normal, no drainage   Throat: Lips, mucosa, and tongue normal; teeth and gums normal  Neck: Supple, symmetrical, trachea midline, no adenopathy;                      Lungs: Clear to auscultation bilaterally, respirations unlabored  Heart: Regular rate and rhythm, S1 and S2 normal, no murmur, rubs or gallop  Abdomen: Soft, non-tender, bowel sounds active all four quadrants,   no masses, no organomegaly  Extremities: Extremities normal, atraumatic, no cyanosis or edema  Pulses: DP pulses are 1-2+ bilat.    Skin: no rashes or lesions  Neurologic: normal and equal strength bilat in upper and lower extremities   lla               Medication List           Accurate as of 7/19/19 11:35 AM. If you have any questions, " ask your nurse or doctor.               CONTINUE taking these medications    MIRENA 20 mcg/24 hours (5 yrs) 52 mg IUD  Generic drug:  levonorgestrel        rizatriptan 10 MG tablet  Also known as:  MAXALT  INSTRUCTIONS:  Take 1 tablet (10 mg total) by mouth as needed for migraine. May repeat in 2 hours if needed        spironolactone 50 MG tablet  Also known as:  ALDACTONE  INSTRUCTIONS:  Take 1 tablet (50 mg total) by mouth daily.        tretinoin 0.025 % cream  Also known as:  RETIN-A           STOP taking these medications    pantoprazole 40 MG tablet  Also known as:  PROTONIX  Stopped by:  Orly Almanza MD            Additional Screenings Completed Today:

## 2021-07-06 VITALS — BODY MASS INDEX: 28.87 KG/M2 | BODY MASS INDEX: 28.87 KG/M2 | WEIGHT: 173.5 LBS | HEIGHT: 65 IN

## 2021-07-14 PROBLEM — F33.0 MILD EPISODE OF RECURRENT MAJOR DEPRESSIVE DISORDER (H): Status: RESOLVED | Noted: 2021-02-19 | Resolved: 2021-05-27

## 2021-08-02 DIAGNOSIS — M54.16 LUMBAR RADICULOPATHY: Primary | ICD-10-CM

## 2021-08-05 RX ORDER — GABAPENTIN 100 MG/1
100 CAPSULE ORAL 3 TIMES DAILY
Qty: 270 CAPSULE | Refills: 4 | Status: SHIPPED | OUTPATIENT
Start: 2021-08-05 | End: 2023-09-07

## 2021-08-05 NOTE — TELEPHONE ENCOUNTER
Routing refill request to provider for review/approval because:  Drug not on the [facility] refill protocol   ___________________________________________________________    Copy of Last Rx:        Last office visit with provider:  5/27/21   ___________________________________________________________    Requested Prescriptions   Pending Prescriptions Disp Refills     gabapentin (NEURONTIN) 100 MG capsule [Pharmacy Med Name: GABAPENTIN 100MG CAPSULES] 90 capsule      Sig: TAKE 1 CAPSULE BY MOUTH THREE TIMES DAILY       There is no refill protocol information for this order          Bebe Wasserman RN 08/04/21 8:36 PM

## 2021-08-15 ENCOUNTER — HEALTH MAINTENANCE LETTER (OUTPATIENT)
Age: 41
End: 2021-08-15

## 2021-09-01 ENCOUNTER — OFFICE VISIT (OUTPATIENT)
Dept: NEUROLOGY | Facility: CLINIC | Age: 41
End: 2021-09-01
Payer: COMMERCIAL

## 2021-09-01 VITALS
BODY MASS INDEX: 29.66 KG/M2 | HEART RATE: 72 BPM | WEIGHT: 178 LBS | DIASTOLIC BLOOD PRESSURE: 78 MMHG | SYSTOLIC BLOOD PRESSURE: 117 MMHG | HEIGHT: 65 IN

## 2021-09-01 DIAGNOSIS — M48.061 LUMBAR FORAMINAL STENOSIS: ICD-10-CM

## 2021-09-01 DIAGNOSIS — G43.809 OTHER MIGRAINE WITHOUT STATUS MIGRAINOSUS, NOT INTRACTABLE: ICD-10-CM

## 2021-09-01 DIAGNOSIS — R25.3 MUSCLE TWITCHING: ICD-10-CM

## 2021-09-01 DIAGNOSIS — R74.8 ELEVATED CK: Primary | ICD-10-CM

## 2021-09-01 PROCEDURE — 99214 OFFICE O/P EST MOD 30 MIN: CPT | Performed by: PSYCHIATRY & NEUROLOGY

## 2021-09-01 RX ORDER — SUMATRIPTAN 50 MG/1
50 TABLET, FILM COATED ORAL
Qty: 18 TABLET | Refills: 11 | Status: SHIPPED | OUTPATIENT
Start: 2021-09-01 | End: 2023-01-26

## 2021-09-01 RX ORDER — TOPIRAMATE 25 MG/1
25 TABLET, FILM COATED ORAL DAILY
Qty: 90 TABLET | Refills: 3 | Status: SHIPPED | OUTPATIENT
Start: 2021-09-01 | End: 2022-09-06

## 2021-09-01 ASSESSMENT — MIFFLIN-ST. JEOR: SCORE: 1473.28

## 2021-09-01 NOTE — PROGRESS NOTES
NEUROLOGY OUTPATIENT PROGRESS NOTE  Sep 1, 2021     CHIEF COMPLAINT/REASON FOR VISIT/REASON FOR CONSULT  Patient presents with:  Migraine  Refill Request: Refill request for Sumatriptan     REASON FOR CONSULTATION- Headaches      HISTORY OF PRESENT ILLNESS  Larissa Elliott is a 41 year old female seen for evaluation of headaches. Headache started when she was younger in college. Headaches were once every week. Headaches are more on the left side throbbing. There was photophobia and photophobia. She does feel nauseous with the headaches. Maxalt caused side effects as result she was switched to Imitrex. She has found walnuts to be a trigger for headaches. Birth control in the placebo week has also been thought to be causing her headaches. She was started on Topamax 25 mg and the headaches have decreased in frequency. Since last visit headaches have been under good control except in the last week where she had continuous multiple headaches. She is not sure what made the headaches worse possibly with changes in season. Imitrex does work as abortive therapy. She is using Excedrin Migraine for milder headaches. There is still around 2 a month except for the rare flareup like last week.    She previously complained of pain in the left leg. She does have a history of L5-S1 microdiscectomy. Pain is more of a cramping/twitching sensation in the thigh and the foot of the left side. MRI lumbar spine did not show any significant lesions that would explain this. It did show previous surgical changes. EMG was negative as well. Blood work was checked for muscle disease and her CK was elevated but no clear cause for it was identified. Repeat CK was also elevated. More recent CK checked by her primary care doctor came back normal.    Since last time patient had worsening pain on the right side. She was identified to have a disc herniation and needed urgent surgery. She is about 6 weeks postop. Her strength is coming back and her  balance is improving. Denies any other neurological issues. Patient denies any triggers that might have caused the disc to herniate.    11/28/20  Patient returns today.  Headaches have slightly worsened.  She is having 5-6 headache days a month.  Headaches are unchanged in nature.  Generally the headaches would be once a week.  She does have vision issues with the headache.  Imitrex does work as abortive therapy.  Patient will have shows if she is too late in taking the medication.  She also been put on antianxiety medication Klonopin recently.  She is going through her divorce which is causing her distress and making headaches worse.  Reports no other triggers.  Headaches unchanged in nature.  Remains on Topamax has issues with taste but no other side effects.    Patient continues to have pain in the right leg.  This more in the back and the hip.  She is planning on going and seeing her orthopedic surgeon again.    Patient denies any muscle pain or muscle twitching.  This was stopped.  CK was still elevated on repeat check.    2/23/21  Patient returns today.  1.  In terms of the headaches these are happening about 3-4 times a month.  Reports no changes in the nature of the headache.  States on Topamax.  Does complain of some taste issues especially when drinking soda.  Denies any other new neurological issues.  Imitrex does work as abortive therapy.  2.  Patient reports no ongoing muscle twitching.  Denies any other weakness or other symptoms.  Previously CK was elevated.  3.  Patient reports that she has been diagnosed with carpal tunnel in both upper extremities.  Has surgery planned for left upper extremity.  She reports no repetitive activity and thinks it is related to pregnancy that happened several years ago.  4.  In terms of her right leg pain she was seen by the surgeon.  She was found to have another rerupture of her lumbar disc.  She is supposed to get repeat surgery done but wants to try doing more  conservative measures during the wintertime and will consider surgery during the summertime    9/1/21  Patient returns today  1.  She remains on Topamax for headaches.  Denies any side effects.  Headaches have 2-3 times a month.  Sumatriptan does work as abortive therapy.  Previously she was having some taste changes with the Topamax and these are still persistent but not bothersome.  2.  She has had shingles had Covid since she was last seen.  3.  She continued to have right leg pain needing repeat surgery  4.  Previously there was concerns for carpal tunnel in both hands.  She has had surgery on the left side continues to have numbness on the right side.  5.  Muscle twitching/fasciculation is manageable and not bothersome.  6.  Stress is about the same.  She has been working from home.    Previous history is reviewed and this is unchanged.    PAST MEDICAL/SURGICAL HISTORY  Past Medical History:   Diagnosis Date     Abnormal Pap smear of cervix     Was told Precancerous cells & HPV     History of anesthesia complications     slow wake up     Migraines      Moderate major depression (H) 2/19/2021     PCOS (polycystic ovarian syndrome)      Patient Active Problem List   Diagnosis     Chronic migraine     Elevated creatine kinase level     Foraminal stenosis of lumbar region     Muscle twitch     PCOS (polycystic ovarian syndrome)     Symptomatic varicose veins, bilateral   Significant for migraine headaches. L5-S1 microdiscectomy surgery      FAMILY HISTORY  Family History   Problem Relation Age of Onset     Migraines Father      Migraines Brother      Seizure Disorder Brother      Asthma Father      Sarcoidosis Father      Asthma Brother      Breast Cancer Maternal Grandmother         Premenopausal     Chronic Obstructive Pulmonary Disease Paternal Grandfather      Psoriasis Mother        SOCIAL HISTORY  Social History     Tobacco Use     Smoking status: Former Smoker     Smokeless tobacco: Never Used   Substance  "Use Topics     Alcohol use: Yes     Alcohol/week: 1.0 standard drinks     Comment: Special occasions only     Drug use: No       SYSTEMS REVIEW  Twelve-system ROS was done and other than the HPI this was negative.   No other new issues reported.    MEDICATIONS  acetaminophen (TYLENOL) 500 MG tablet, Take 500 mg by mouth  cholecalciferol 25 MCG (1000 UT) TABS, Take 2,000 Units by mouth  escitalopram (LEXAPRO) 10 MG tablet, Take 5 mg by mouth   gabapentin (NEURONTIN) 100 MG capsule, Take 1 capsule (100 mg) by mouth 3 times daily  Multiple Vitamins-Minerals (MULTIVITAMIN ADULT PO),   spironolactone (ALDACTONE) 50 MG tablet, Take 50 mg by mouth  clonazePAM (KLONOPIN) 0.5 MG tablet, Take 0.5 mg by mouth as needed (Patient not taking: Reported on 9/1/2021)  hydrOXYzine (VISTARIL) 25 MG capsule,   ondansetron (ZOFRAN) 8 MG tablet,   oxyCODONE (ROXICODONE) 5 MG tablet,   study - metFORMIN vs. placebo, IDS# [id number], 500 MG tablet, On Day 1, take one tablet in the morning. On Days 2-5, take one tablet in the morning and evening. On Days 6-14, take one tablet in the morning and two tablets in the evening. (Patient not taking: Reported on 9/1/2021)    No current facility-administered medications on file prior to visit.       PHYSICAL EXAMINATION  VITALS: /78 (BP Location: Right arm, Patient Position: Sitting)   Pulse 72   Ht 1.651 m (5' 5\")   Wt 80.7 kg (178 lb)   BMI 29.62 kg/m    GENERAL: Healthy appearing, alert, no acute distress, normal habitus.  CARDIOVASCULAR: Extremities warm and well-perfused.  NEUROLOGICAL:  Patient is awake and oriented to self, place and time.  Attention span is normal.  Memory is grossly intact.  Language is fluent and follows commands appropriately.  Appropriate fund of knowledge. Cranial nerves 2-12 are intact. There is no pronator drift.  Motor exam shows 5/5 strength in all extremities.  Tone is symmetric bilaterally in upper and lower extremities.  (Previously reflexes are symmetric and " 2+ in upper extremities and lower extremities.)  Finger to nose is without dysmetria.    Gait is normal and the patient is able to do tandem walk .         DIAGNOSTICS  MRI L spine  1.  Interval postoperative changes left discectomy at L4-L5 with   resolution of previously present left paracentral disc protrusion. Mild   right lateral recess narrowing, similar to prior. Overall mild canal   narrowing. Mild to moderate bilateral foraminal   narrowing, similar to prior.  2.  Interval resolution of left paracentral disc protrusion/extrusion at   L5-S1. No significant canal or foraminal narrowing.  3.  Tiny central disc protrusion with annular tear at L3-L4. Mild   bilateral lateral recess narrowing. Overall mild canal narrowing. No   significant change.  4.  Normal distal cord.      EMG  CLINICAL INTERPRETATION:  This is a normal nerve conduction and EMG study except absent left superficial peroneal sensory SNAP which could be artifactual or represent early sensory neuropathy. Further clinical correlation is needed.     MRI brain  MPRESSION:   1.  Normal head MRI.   2.  No acute/subacute infarction, intracranial hemorrhage, mass, mass   effect, or hydrocephalus.     RELEVANT LABS  .  Component      Latest Ref Rng & Units 12/5/2019 1/22/2020 2/26/2020 11/24/2020   CK, Total      30 - 190 U/L 256 (H) 398 (H) 121 454 (H)     Component Latest Ref Rng & Units 1/9/2019 7/19/2019   Sodium 136 - 145 mmol/L 140   Potassium 3.5 - 5.0 mmol/L 4.6   Chloride 98 - 107 mmol/L 107   CO2 22 - 31 mmol/L 21 (L)   Anion Gap, Calculation 5 - 18 mmol/L 12   Glucose 70 - 125 mg/dL 79   BUN 8 - 22 mg/dL 9   Creatinine 0.60 - 1.10 mg/dL 0.84   GFR MDRD Af Amer >60 mL/min/1.73m2 >60   GFR MDRD Non Af Amer >60 mL/min/1.73m2 >60   Bilirubin, Total 0.0 - 1.0 mg/dL 0.6   Calcium 8.5 - 10.5 mg/dL 9.4   Protein, Total 6.0 - 8.0 g/dL 6.7   ALBUMIN 3.5 - 5.0 g/dL 4.0   Alkaline Phosphatase 45 - 120 U/L 40 (L)   AST 0 - 40 U/L 44 (H)   ALT 0 - 45  U/L 39   Cholesterol <=199 mg/dL 205 (H)   Triglycerides <=149 mg/dL 101   HDL Cholesterol >=50 mg/dL 52   LDL Calculated <=129 mg/dL 133 (H)   Patient Fasting > 8hrs? Yes   Hemoglobin A1c 3.5 - 6.0 % 5.4   TSH 0.30 - 5.00 uIU/mL 1.49     IMPRESSION/REPORT/PLAN  Other migraine without status migrainosus, not intractable  Elevated CK  Muscle twitching  Stress  Lumbar foraminal stenosis    This is a 41 year old female episodic migraines.  Headaches are appropriately controlled with Topamax 25 mg every night.  We will continue this.  Monitor for side effects.  Currently the side effects are tolerable.  We will continue Imitrex for abortive therapy.    In terms of her muscle fasciculations/twitching EMG has not shown possible etiology.  Her blood work for CK was elevated but this was of unclear etiology.  Possibly she does have chronic elevated CK.  Did discuss biopsy in the past but will hold off as it is unlikely to show muscle disease and her symptoms are stable.    Continue to monitor bilateral carpal tunnel.  She has had surgery for the left side.    Would defer management of the lumbar formal stenosis to her surgeon.    I can see her back in 1 year.    -     SUMAtriptan (IMITREX) 50 MG tablet; Take 1 tablet (50 mg) by mouth at onset of headache for migraine May repeat in 2 hours. Max 2 tablets/24 hours.  -     topiramate (TOPAMAX) 25 MG tablet; Take 1 tablet (25 mg) by mouth daily      Return in about 1 year (around 9/1/2022) for In-Clinic Visit.     Over 30 minutes were spent coordinating the care for the patient on the day of the encounter.  This includes previsit, during visit and post visit activities as documented above.  Multiple problems reviewed/discussed today.  Prescription management.  (Activities include but not inclusive of reviewing chart, reviewing outside records, reviewing labs and imaging study results as well as the images, patient visit time including getting history and exam,  use  if applicable, review of test results with the patient and coming up with a plan in a shared model, counseling patient and family, education and answering patient questions, EMR , EMR diagnosis entry and problem list management, medication reconciliation and prescription management if applicable, paperwork if applicable, printing documents and documentation of the visit activities.)      Rj Lopes MD  Neurologist  [facility]rology [facility]  Tel:- [phone]    This note was dictated using voice recognition software.  Any grammatical or context distortions are unintentional and inherent to the software.

## 2021-09-01 NOTE — LETTER
9/1/2021         RE: Larissa Elliott  [address]  [facility] [address]        Dear Colleague,    Thank you for referring your patient, Larissa Elliott, to the [facility] [place]. Please see a copy of my visit note below.    NEUROLOGY OUTPATIENT PROGRESS NOTE  Sep 1, 2021     CHIEF COMPLAINT/REASON FOR VISIT/REASON FOR CONSULT  Patient presents with:  Migraine  Refill Request: Refill request for Sumatriptan     REASON FOR CONSULTATION- Headaches      HISTORY OF PRESENT ILLNESS  Larissa Elliott is a 41 year old female seen for evaluation of headaches. Headache started when she was younger in college. Headaches were once every week. Headaches are more on the left side throbbing. There was photophobia and photophobia. She does feel nauseous with the headaches. Maxalt caused side effects as result she was switched to Imitrex. She has found walnuts to be a trigger for headaches. Birth control in the placebo week has also been thought to be causing her headaches. She was started on Topamax 25 mg and the headaches have decreased in frequency. Since last visit headaches have been under good control except in the last week where she had continuous multiple headaches. She is not sure what made the headaches worse possibly with changes in season. Imitrex does work as abortive therapy. She is using Excedrin Migraine for milder headaches. There is still around 2 a month except for the rare flareup like last week.    She previously complained of pain in the left leg. She does have a history of L5-S1 microdiscectomy. Pain is more of a cramping/twitching sensation in the thigh and the foot of the left side. MRI lumbar spine did not show any significant lesions that would explain this. It did show previous surgical changes. EMG was negative as well. Blood work was checked for muscle disease and her CK was elevated but no clear cause for it was identified. Repeat CK was also elevated. More recent CK  checked by her primary care doctor came back normal.    Since last time patient had worsening pain on the right side. She was identified to have a disc herniation and needed urgent surgery. She is about 6 weeks postop. Her strength is coming back and her balance is improving. Denies any other neurological issues. Patient denies any triggers that might have caused the disc to herniate.    11/28/20  Patient returns today.  Headaches have slightly worsened.  She is having 5-6 headache days a month.  Headaches are unchanged in nature.  Generally the headaches would be once a week.  She does have vision issues with the headache.  Imitrex does work as abortive therapy.  Patient will have shows if she is too late in taking the medication.  She also been put on antianxiety medication Klonopin recently.  She is going through her divorce which is causing her distress and making headaches worse.  Reports no other triggers.  Headaches unchanged in nature.  Remains on Topamax has issues with taste but no other side effects.    Patient continues to have pain in the right leg.  This more in the back and the hip.  She is planning on going and seeing her orthopedic surgeon again.    Patient denies any muscle pain or muscle twitching.  This was stopped.  CK was still elevated on repeat check.    2/23/21  Patient returns today.  1.  In terms of the headaches these are happening about 3-4 times a month.  Reports no changes in the nature of the headache.  States on Topamax.  Does complain of some taste issues especially when drinking soda.  Denies any other new neurological issues.  Imitrex does work as abortive therapy.  2.  Patient reports no ongoing muscle twitching.  Denies any other weakness or other symptoms.  Previously CK was elevated.  3.  Patient reports that she has been diagnosed with carpal tunnel in both upper extremities.  Has surgery planned for left upper extremity.  She reports no repetitive activity and thinks it is  related to pregnancy that happened several years ago.  4.  In terms of her right leg pain she was seen by the surgeon.  She was found to have another rerupture of her lumbar disc.  She is supposed to get repeat surgery done but wants to try doing more conservative measures during the wintertime and will consider surgery during the summertime    9/1/21  Patient returns today  1.  She remains on Topamax for headaches.  Denies any side effects.  Headaches have 2-3 times a month.  Sumatriptan does work as abortive therapy.  Previously she was having some taste changes with the Topamax and these are still persistent but not bothersome.  2.  She has had shingles had Covid since she was last seen.  3.  She continued to have right leg pain needing repeat surgery  4.  Previously there was concerns for carpal tunnel in both hands.  She has had surgery on the left side continues to have numbness on the right side.  5.  Muscle twitching/fasciculation is manageable and not bothersome.  6.  Stress is about the same.  She has been working from home.    Previous history is reviewed and this is unchanged.    PAST MEDICAL/SURGICAL HISTORY  Past Medical History:   Diagnosis Date     Abnormal Pap smear of cervix     Was told Precancerous cells & HPV     History of anesthesia complications     slow wake up     Migraines      Moderate major depression (H) 2/19/2021     PCOS (polycystic ovarian syndrome)      Patient Active Problem List   Diagnosis     Chronic migraine     Elevated creatine kinase level     Foraminal stenosis of lumbar region     Muscle twitch     PCOS (polycystic ovarian syndrome)     Symptomatic varicose veins, bilateral   Significant for migraine headaches. L5-S1 microdiscectomy surgery      FAMILY HISTORY  Family History   Problem Relation Age of Onset     Migraines Father      Migraines Brother      Seizure Disorder Brother      Asthma Father      Sarcoidosis Father      Asthma Brother      Breast Cancer Maternal  "Grandmother         Premenopausal     Chronic Obstructive Pulmonary Disease Paternal Grandfather      Psoriasis Mother        SOCIAL HISTORY  Social History     Tobacco Use     Smoking status: Former Smoker     Smokeless tobacco: Never Used   Substance Use Topics     Alcohol use: Yes     Alcohol/week: 1.0 standard drinks     Comment: Special occasions only     Drug use: No       SYSTEMS REVIEW  Twelve-system ROS was done and other than the HPI this was negative.   No other new issues reported.    MEDICATIONS  acetaminophen (TYLENOL) 500 MG tablet, Take 500 mg by mouth  cholecalciferol 25 MCG (1000 UT) TABS, Take 2,000 Units by mouth  escitalopram (LEXAPRO) 10 MG tablet, Take 5 mg by mouth   gabapentin (NEURONTIN) 100 MG capsule, Take 1 capsule (100 mg) by mouth 3 times daily  Multiple Vitamins-Minerals (MULTIVITAMIN ADULT PO),   spironolactone (ALDACTONE) 50 MG tablet, Take 50 mg by mouth  clonazePAM (KLONOPIN) 0.5 MG tablet, Take 0.5 mg by mouth as needed (Patient not taking: Reported on 9/1/2021)  hydrOXYzine (VISTARIL) 25 MG capsule,   ondansetron (ZOFRAN) 8 MG tablet,   oxyCODONE (ROXICODONE) 5 MG tablet,   study - metFORMIN vs. placebo, IDS# [id number], 500 MG tablet, On Day 1, take one tablet in the morning. On Days 2-5, take one tablet in the morning and evening. On Days 6-14, take one tablet in the morning and two tablets in the evening. (Patient not taking: Reported on 9/1/2021)    No current facility-administered medications on file prior to visit.       PHYSICAL EXAMINATION  VITALS: /78 (BP Location: Right arm, Patient Position: Sitting)   Pulse 72   Ht 1.651 m (5' 5\")   Wt 80.7 kg (178 lb)   BMI 29.62 kg/m    GENERAL: Healthy appearing, alert, no acute distress, normal habitus.  CARDIOVASCULAR: Extremities warm and well-perfused.  NEUROLOGICAL:  Patient is awake and oriented to self, place and time.  Attention span is normal.  Memory is grossly intact.  Language is fluent and follows commands " appropriately.  Appropriate fund of knowledge. Cranial nerves 2-12 are intact. There is no pronator drift.  Motor exam shows 5/5 strength in all extremities.  Tone is symmetric bilaterally in upper and lower extremities.  (Previously reflexes are symmetric and 2+ in upper extremities and lower extremities.)  Finger to nose is without dysmetria.    Gait is normal and the patient is able to do tandem walk .         DIAGNOSTICS  MRI L spine  1.  Interval postoperative changes left discectomy at L4-L5 with   resolution of previously present left paracentral disc protrusion. Mild   right lateral recess narrowing, similar to prior. Overall mild canal   narrowing. Mild to moderate bilateral foraminal   narrowing, similar to prior.  2.  Interval resolution of left paracentral disc protrusion/extrusion at   L5-S1. No significant canal or foraminal narrowing.  3.  Tiny central disc protrusion with annular tear at L3-L4. Mild   bilateral lateral recess narrowing. Overall mild canal narrowing. No   significant change.  4.  Normal distal cord.      EMG  CLINICAL INTERPRETATION:  This is a normal nerve conduction and EMG study except absent left superficial peroneal sensory SNAP which could be artifactual or represent early sensory neuropathy. Further clinical correlation is needed.     MRI brain  MPRESSION:   1.  Normal head MRI.   2.  No acute/subacute infarction, intracranial hemorrhage, mass, mass   effect, or hydrocephalus.     RELEVANT LABS  .  Component      Latest Ref Rng & Units 12/5/2019 1/22/2020 2/26/2020 11/24/2020   CK, Total      30 - 190 U/L 256 (H) 398 (H) 121 454 (H)     Component Latest Ref Rng & Units 1/9/2019 7/19/2019   Sodium 136 - 145 mmol/L 140   Potassium 3.5 - 5.0 mmol/L 4.6   Chloride 98 - 107 mmol/L 107   CO2 22 - 31 mmol/L 21 (L)   Anion Gap, Calculation 5 - 18 mmol/L 12   Glucose 70 - 125 mg/dL 79   BUN 8 - 22 mg/dL 9   Creatinine 0.60 - 1.10 mg/dL 0.84   GFR MDRD Af Amer >60 mL/min/1.73m2 >60    GFR MDRD Non Af Amer >60 mL/min/1.73m2 >60   Bilirubin, Total 0.0 - 1.0 mg/dL 0.6   Calcium 8.5 - 10.5 mg/dL 9.4   Protein, Total 6.0 - 8.0 g/dL 6.7   ALBUMIN 3.5 - 5.0 g/dL 4.0   Alkaline Phosphatase 45 - 120 U/L 40 (L)   AST 0 - 40 U/L 44 (H)   ALT 0 - 45 U/L 39   Cholesterol <=199 mg/dL 205 (H)   Triglycerides <=149 mg/dL 101   HDL Cholesterol >=50 mg/dL 52   LDL Calculated <=129 mg/dL 133 (H)   Patient Fasting > 8hrs? Yes   Hemoglobin A1c 3.5 - 6.0 % 5.4   TSH 0.30 - 5.00 uIU/mL 1.49     IMPRESSION/REPORT/PLAN  Other migraine without status migrainosus, not intractable  Elevated CK  Muscle twitching  Stress  Lumbar foraminal stenosis    This is a 41 year old female episodic migraines.  Headaches are appropriately controlled with Topamax 25 mg every night.  We will continue this.  Monitor for side effects.  Currently the side effects are tolerable.  We will continue Imitrex for abortive therapy.    In terms of her muscle fasciculations/twitching EMG has not shown possible etiology.  Her blood work for CK was elevated but this was of unclear etiology.  Possibly she does have chronic elevated CK.  Did discuss biopsy in the past but will hold off as it is unlikely to show muscle disease and her symptoms are stable.    Continue to monitor bilateral carpal tunnel.  She has had surgery for the left side.    Would defer management of the lumbar formal stenosis to her surgeon.    I can see her back in 1 year.    -     SUMAtriptan (IMITREX) 50 MG tablet; Take 1 tablet (50 mg) by mouth at onset of headache for migraine May repeat in 2 hours. Max 2 tablets/24 hours.  -     topiramate (TOPAMAX) 25 MG tablet; Take 1 tablet (25 mg) by mouth daily      Return in about 1 year (around 9/1/2022) for In-Clinic Visit.     Over 30 minutes were spent coordinating the care for the patient on the day of the encounter.  This includes previsit, during visit and post visit activities as documented above.  Multiple problems  reviewed/discussed today.  Prescription management.  (Activities include but not inclusive of reviewing chart, reviewing outside records, reviewing labs and imaging study results as well as the images, patient visit time including getting history and exam,  use if applicable, review of test results with the patient and coming up with a plan in a shared model, counseling patient and family, education and answering patient questions, EMR , EMR diagnosis entry and problem list management, medication reconciliation and prescription management if applicable, paperwork if applicable, printing documents and documentation of the visit activities.)      Rj Lopes MD  Neurologist  [facility]  Tel:- [phone]    This note was dictated using voice recognition software.  Any grammatical or context distortions are unintentional and inherent to the software.        Again, thank you for allowing me to participate in the care of your patient.        Sincerely,        Rj Lopes MD

## 2021-09-01 NOTE — NURSING NOTE
Chief Complaint   Patient presents with     Migraine     Refill Request     Refill request for Sumatriptan      Chyna Mcpherson MA on 9/1/2021 at 1:32 PM

## 2021-10-09 ENCOUNTER — HOSPITAL ENCOUNTER (OUTPATIENT)
Dept: MAMMOGRAPHY | Facility: CLINIC | Age: 41
Discharge: HOME | End: 2021-10-09
Attending: FAMILY MEDICINE | Admitting: FAMILY MEDICINE
Payer: COMMERCIAL

## 2021-10-09 DIAGNOSIS — Z12.31 SCREENING MAMMOGRAM, ENCOUNTER FOR: ICD-10-CM

## 2021-10-09 PROCEDURE — 77063 BREAST TOMOSYNTHESIS BI: CPT

## 2021-10-11 ENCOUNTER — HEALTH MAINTENANCE LETTER (OUTPATIENT)
Age: 41
End: 2021-10-11

## 2021-10-16 ENCOUNTER — IMMUNIZATION (OUTPATIENT)
Dept: FAMILY MEDICINE | Facility: CLINIC | Age: 41
End: 2021-10-16
Payer: COMMERCIAL

## 2021-10-16 PROCEDURE — 90686 IIV4 VACC NO PRSV 0.5 ML IM: CPT

## 2021-10-16 PROCEDURE — 90471 IMMUNIZATION ADMIN: CPT

## 2021-11-02 ENCOUNTER — OFFICE VISIT (OUTPATIENT)
Dept: FAMILY MEDICINE | Facility: CLINIC | Age: 41
End: 2021-11-02
Payer: COMMERCIAL

## 2021-11-02 VITALS
SYSTOLIC BLOOD PRESSURE: 110 MMHG | OXYGEN SATURATION: 99 % | WEIGHT: 180.9 LBS | HEIGHT: 66 IN | BODY MASS INDEX: 29.07 KG/M2 | DIASTOLIC BLOOD PRESSURE: 70 MMHG | HEART RATE: 90 BPM

## 2021-11-02 DIAGNOSIS — Z86.19 H/O HERPES ZOSTER: ICD-10-CM

## 2021-11-02 DIAGNOSIS — G43.719 INTRACTABLE CHRONIC MIGRAINE WITHOUT AURA AND WITHOUT STATUS MIGRAINOSUS: ICD-10-CM

## 2021-11-02 DIAGNOSIS — R53.82 CHRONIC FATIGUE: ICD-10-CM

## 2021-11-02 DIAGNOSIS — Z97.5 IUD (INTRAUTERINE DEVICE) IN PLACE: ICD-10-CM

## 2021-11-02 DIAGNOSIS — Z13.228 SCREENING FOR METABOLIC DISORDER: ICD-10-CM

## 2021-11-02 DIAGNOSIS — Z00.01 ENCOUNTER FOR ROUTINE ADULT MEDICAL EXAM WITH ABNORMAL FINDINGS: Primary | ICD-10-CM

## 2021-11-02 PROBLEM — F32.0 MILD MAJOR DEPRESSION (H): Status: ACTIVE | Noted: 2021-05-27

## 2021-11-02 PROBLEM — G56.02 CARPAL TUNNEL SYNDROME OF LEFT WRIST: Status: ACTIVE | Noted: 2021-01-29

## 2021-11-02 LAB
CHOLEST SERPL-MCNC: 214 MG/DL
ERYTHROCYTE [DISTWIDTH] IN BLOOD BY AUTOMATED COUNT: 12.3 % (ref 10–15)
FASTING STATUS PATIENT QL REPORTED: YES
HBA1C MFR BLD: 5.3 % (ref 0–5.6)
HCT VFR BLD AUTO: 39.5 % (ref 35–47)
HDLC SERPL-MCNC: 53 MG/DL
HGB BLD-MCNC: 13.4 G/DL (ref 11.7–15.7)
LDLC SERPL CALC-MCNC: 143 MG/DL
MCH RBC QN AUTO: 32.1 PG (ref 26.5–33)
MCHC RBC AUTO-ENTMCNC: 33.9 G/DL (ref 31.5–36.5)
MCV RBC AUTO: 95 FL (ref 78–100)
PLATELET # BLD AUTO: 254 10E3/UL (ref 150–450)
RBC # BLD AUTO: 4.17 10E6/UL (ref 3.8–5.2)
TRIGL SERPL-MCNC: 91 MG/DL
TSH SERPL DL<=0.005 MIU/L-ACNC: 1.07 UIU/ML (ref 0.3–5)
WBC # BLD AUTO: 4.4 10E3/UL (ref 4–11)

## 2021-11-02 PROCEDURE — 96127 BRIEF EMOTIONAL/BEHAV ASSMT: CPT | Mod: 59 | Performed by: FAMILY MEDICINE

## 2021-11-02 PROCEDURE — 85027 COMPLETE CBC AUTOMATED: CPT | Performed by: FAMILY MEDICINE

## 2021-11-02 PROCEDURE — 99396 PREV VISIT EST AGE 40-64: CPT | Mod: 25 | Performed by: FAMILY MEDICINE

## 2021-11-02 PROCEDURE — 83036 HEMOGLOBIN GLYCOSYLATED A1C: CPT | Performed by: FAMILY MEDICINE

## 2021-11-02 PROCEDURE — 82306 VITAMIN D 25 HYDROXY: CPT | Performed by: FAMILY MEDICINE

## 2021-11-02 PROCEDURE — 90471 IMMUNIZATION ADMIN: CPT | Performed by: FAMILY MEDICINE

## 2021-11-02 PROCEDURE — 80061 LIPID PANEL: CPT | Performed by: FAMILY MEDICINE

## 2021-11-02 PROCEDURE — 90750 HZV VACC RECOMBINANT IM: CPT | Performed by: FAMILY MEDICINE

## 2021-11-02 PROCEDURE — 99214 OFFICE O/P EST MOD 30 MIN: CPT | Mod: 25 | Performed by: FAMILY MEDICINE

## 2021-11-02 PROCEDURE — 36415 COLL VENOUS BLD VENIPUNCTURE: CPT | Performed by: FAMILY MEDICINE

## 2021-11-02 PROCEDURE — 84443 ASSAY THYROID STIM HORMONE: CPT | Performed by: FAMILY MEDICINE

## 2021-11-02 ASSESSMENT — ANXIETY QUESTIONNAIRES
IF YOU CHECKED OFF ANY PROBLEMS ON THIS QUESTIONNAIRE, HOW DIFFICULT HAVE THESE PROBLEMS MADE IT FOR YOU TO DO YOUR WORK, TAKE CARE OF THINGS AT HOME, OR GET ALONG WITH OTHER PEOPLE: NOT DIFFICULT AT ALL
4. TROUBLE RELAXING: NOT AT ALL
1. FEELING NERVOUS, ANXIOUS, OR ON EDGE: NOT AT ALL
7. FEELING AFRAID AS IF SOMETHING AWFUL MIGHT HAPPEN: NOT AT ALL
GAD7 TOTAL SCORE: 0
5. BEING SO RESTLESS THAT IT IS HARD TO SIT STILL: NOT AT ALL
3. WORRYING TOO MUCH ABOUT DIFFERENT THINGS: NOT AT ALL
6. BECOMING EASILY ANNOYED OR IRRITABLE: NOT AT ALL
2. NOT BEING ABLE TO STOP OR CONTROL WORRYING: NOT AT ALL

## 2021-11-02 ASSESSMENT — PATIENT HEALTH QUESTIONNAIRE - PHQ9: SUM OF ALL RESPONSES TO PHQ QUESTIONS 1-9: 4

## 2021-11-02 ASSESSMENT — MIFFLIN-ST. JEOR: SCORE: 1494.37

## 2021-11-02 NOTE — PROGRESS NOTES
SUBJECTIVE:   CC: [id number]  who presents for preventive health visit.       Patient has been advised of split billing requirements and indicates understanding: Yes    Healthy Habits:     Getting at least 3 servings of Calcium per day:  Yes    Bi-annual eye exam:  NO    Dental care twice a year:  Yes    Sleep apnea or symptoms of sleep apnea:  None    Diet:  Regular (no restrictions)    Frequency of exercise:  1 day/week    Duration of exercise:  15-30 minutes    Taking medications regularly:  Yes    Medication side effects:  None    PHQ-2 Total Score: 1    Additional concerns today:  No      Chronic fatigue: A lot of things happening in last 1-1/2-year, going through a nasty divorce, chronic back pain but finally had some relief after the recent microdiscectomy, carpal tunnels surgery, then had a Covid infection and herpes zoster.  Also continue to struggle with depression anxiety which seems to be better controlled now  Also sleep issues and migraine headaches also could be contributing factors to the chronic fatigue.  But patient would like to get the CBC done just to make sure low hemoglobin is not the cause    Depression and Anxiety Follow-Up  How are you doing with your depression since your last visit? Improved  Currently only taking 5 mg of lexapro,   How are you doing with your anxiety since your last visit?  Improved  Also divorce proceedings in final stage so happy about that   Are you having other symptoms that might be associated with depression or anxiety? Yes:  chronic fatigue   Have you had a significant life event? Relationship Concerns   Do you have any concerns with your use of alcohol or other drugs? No    Social History     Tobacco Use     Smoking status: Former Smoker     Smokeless tobacco: Never Used   Substance Use Topics     Alcohol use: Yes     Alcohol/week: 1.0 standard drinks     Comment: Special occasions only     Drug use: No     PHQ 8/31/2020 2/19/2021 11/2/2021   PHQ-9 Total Score 11  3 4   Q9: Thoughts of better off dead/self-harm past 2 weeks Not at all Not at all Not at all     CLAYTON-7 SCORE 8/31/2020 2/19/2021   Total Score 20 7     Last PHQ-9 11/2/2021   1.  Little interest or pleasure in doing things 0   2.  Feeling down, depressed, or hopeless 1   3.  Trouble falling or staying asleep, or sleeping too much 1   4.  Feeling tired or having little energy 2   5.  Poor appetite or overeating 0   6.  Feeling bad about yourself 0   7.  Trouble concentrating 0   8.  Moving slowly or restless 0   Q9: Thoughts of better off dead/self-harm past 2 weeks 0   PHQ-9 Total Score 4   Difficulty at work, home, or with people Not difficult at all     CLAYTON-7  2/19/2021   1. Feeling nervous, anxious, or on edge 1   2. Not being able to stop or control worrying 1   3. Worrying too much about different things 1   4. Trouble relaxing 1   5. Being so restless that it is hard to sit still 1   6. Becoming easily annoyed or irritable 1   7. Feeling afraid, as if something awful might happen 1   CLAYTON-7 Total Score 7   If you checked any problems, how difficult have they made it for you to do your work, take care of things at home, or get along with other people? Not difficult at all           PHQ 8/31/2020 2/19/2021 11/2/2021   PHQ-9 Total Score 11 3 4   Q9: Thoughts of better off dead/self-harm past 2 weeks Not at all Not at all Not at all        Today's PHQ-2 Score:   PHQ-2 ( 1999 Pfizer) 11/2/2021   Q1: Little interest or pleasure in doing things 0   Q2: Feeling down, depressed or hopeless 1   PHQ-2 Score 1   Q1: Little interest or pleasure in doing things Not at all   Q2: Feeling down, depressed or hopeless Several days   PHQ-2 Score 1       Abuse: Current or Past (Physical, Sexual or Emotional) - No  Do you feel safe in your environment? Yes    Have you ever done Advance Care Planning? (For example, a Health Directive, POLST, or a discussion with a medical provider or your loved ones about your wishes): No, advance  care planning information given to patient to review.  Patient declined advance care planning discussion at this time.    Social History     Tobacco Use     Smoking status: Former Smoker     Smokeless tobacco: Never Used   Substance Use Topics     Alcohol use: Yes     Alcohol/week: 1.0 standard drinks     Comment: Special occasions only       If you drink alcohol do you typically have >3 drinks per day or >7 drinks per week? No    ASSESSMENT/PLAN:   Larissa was seen today for physical.    Diagnoses and all orders for this visit:    Encounter for routine adult medical exam with abnormal findings  -     TD (ADULT, 7+) PRESERVE FREE    Screening for metabolic disorder  -     Hemoglobin A1c; Future  -     Lipid Profile; Future  -     Hemoglobin A1c  -     Lipid Profile    Intractable chronic migraine without aura and without status migrainosus  Comments:  stable on topamax    Chronic fatigue  Comments:  patient currently on topamax, gabapentine and spironolactone, all of them can cause dehydration , recommend plenty of fluid through out the day  Follow-up on her CBC  Orders:  -     CBC with platelets; Future  -     Vitamin D deficiency screening; Future  -     TSH with free T4 reflex; Future  -     CBC with platelets  -     Vitamin D deficiency screening  -     TSH with free T4 reflex    IUD (intrauterine device) in place  Comments:  stable, currently have mirena replaced 2018    H/O herpes zoster  Comments:  h/o herpes zoster after her recent Dx of COVID in June 2021 , will do vaccine #1 dose today  Orders:  -     ZOSTER VACCINE RECOMBINANT ADJUVANTED (SHINGRIX)        Patient has been advised of split billing requirements and indicates understanding: Yes    COUNSELING:  Reviewed preventive health counseling, as reflected in patient instructions       Regular exercise       Healthy diet/nutrition       Vision screening       Hearing screening       Contraception       Advance Care Planning    Estimated body mass index  "is 29.65 kg/m  as calculated from the following:    Height as of this encounter: 1.664 m (5' 5.5\").    Weight as of this encounter: 82.1 kg (180 lb 14.4 oz).        She reports that she has quit smoking. She has never used smokeless tobacco.      Reviewed orders with patient.  Reviewed health maintenance and updated orders accordingly - Yes  Lab work is in process  Labs reviewed in EPIC  BP Readings from Last 3 Encounters:   11/02/21 110/70   09/01/21 117/78   05/27/21 106/62    Wt Readings from Last 3 Encounters:   11/02/21 82.1 kg (180 lb 14.4 oz)   09/01/21 80.7 kg (178 lb)   06/03/21 78.7 kg (173 lb 8 oz)                 Patient Active Problem List   Diagnosis     Chronic migraine     Elevated creatine kinase level     Foraminal stenosis of lumbar region     Muscle twitch     PCOS (polycystic ovarian syndrome)     Symptomatic varicose veins, bilateral     Mild major depression (H)     Carpal tunnel syndrome of left wrist     IUD (intrauterine device) in place     Past Surgical History:   Procedure Laterality Date     BACK SURGERY Left 2003    L5 S1     ENDOMETRIAL ABLATION       GYNECOLOGIC CRYOSURGERY       HC REVISE MEDIAN N/CARPAL TUNNEL SURG Left 2/26/2021    Procedure: LEFT CARPAL TUNNEL RELEASE;  Surgeon: Anna Hameed MD;  Location: [facility];  Service: Neurosurgery     LAPAROTOMY EXPLORATORY       LUMBAR DISCECTOMY Left 1/30/2015    Procedure: LEFT LUMBAR 4-5 MICRODISCECTOMY AND REVISION LEFT LUMBAR 5-SACRAL 1 MICRODISCECTOMY;  Surgeon: Mick Bolton MD;  Location: [facility] OR;  Service:      LUMBAR DISCECTOMY Left 1/30/2015    Procedure: LEFT L4-5 MICRODISCECTOMY AND REVISION LEFT L5-S1 MICRODISCECTOMY;  Surgeon: Mick Bolton MD;  Location: [facility] OR;  Service:      OTHER SURGICAL HISTORY Bilateral 2003    back surgeryDisc ectomy L5/S1     IN LAMNOTMY INCL W/DCMPRSN NRV ROOT 1 INTRSPC LUMBR Right 3/5/2020    Procedure: MICRODISCECTOMY RIGHT LUMBAR " 4-5 LATERAL RECESS DECOMPRESSION RIGHT LUMBAR 4-5;  Surgeon: Mick Bolton MD;  Location: [facility] Main OR;  Service: Spine     NE LAMNOTMY INCL W/DCMPRSN NRV ROOT 1 INTRSPC LUMBR Right 6/3/2021    Procedure: RIGHT LUMBAR 4-5 REVISION MICRODISCECTOMY;  Surgeon: Mick Bolton MD;  Location: [facility] OR;  Service: Spine     SPINE SURGERY         Social History     Tobacco Use     Smoking status: Former Smoker     Smokeless tobacco: Never Used   Substance Use Topics     Alcohol use: Yes     Alcohol/week: 1.0 standard drinks     Comment: Special occasions only     Family History   Problem Relation Age of Onset     Migraines Father      Migraines Brother      Seizure Disorder Brother      Asthma Father      Sarcoidosis Father      Asthma Brother      Breast Cancer Maternal Grandmother         Premenopausal     Chronic Obstructive Pulmonary Disease Paternal Grandfather      Psoriasis Mother            Current Outpatient Medications   Medication Sig Dispense Refill     acetaminophen (TYLENOL) 500 MG tablet Take 500 mg by mouth       cholecalciferol 25 MCG (1000 UT) TABS Take 2,000 Units by mouth       escitalopram (LEXAPRO) 10 MG tablet Take 5 mg by mouth        gabapentin (NEURONTIN) 100 MG capsule Take 1 capsule (100 mg) by mouth 3 times daily 270 capsule 4     Multiple Vitamins-Minerals (MULTIVITAMIN ADULT PO)        spironolactone (ALDACTONE) 50 MG tablet Take 50 mg by mouth       SUMAtriptan (IMITREX) 50 MG tablet Take 1 tablet (50 mg) by mouth at onset of headache for migraine May repeat in 2 hours. Max 2 tablets/24 hours. 18 tablet 11     topiramate (TOPAMAX) 25 MG tablet Take 1 tablet (25 mg) by mouth daily 90 tablet 3     Allergies   Allergen Reactions     Latex Rash       Recent Labs   Lab Test 05/27/21  1458 04/20/21  1039 02/19/21  1017 02/26/20  1413 12/05/19  0837 12/05/19  0837 07/19/19  1013 01/09/19  0829 01/09/19  0829   A1C  --   --   --   --   --  5.0 5.4  --   --    LDL   --   --   --  94  --   --  133*  --  117   HDL  --   --   --  40*  --   --  52  --  52   TRIG  --   --   --  127  --   --  101  --  77   ALT  --   --   --   --   --  20  --   --  39   CR 0.88 0.86   < > 0.75   < > 0.93  --    < > 0.84   GFRESTIMATED >60 >60   < > >60   < > >60  --    < > >60   GFRESTBLACK >60 >60   < > >60   < > >60  --    < > >60   POTASSIUM 4.4 4.6   < > 3.9   < > 4.3  --    < > 4.6   TSH  --   --   --   --   --  0.87 1.49   < > 0.93    < > = values in this interval not displayed.        Breast Cancer Screening:  Any new diagnosis of family breast, ovarian, or bowel cancer? No    Pertinent mammograms are reviewed under the imaging tab.    History of abnormal Pap smear: NO - age 30-65 PAP every 5 years with negative HPV co-testing recommended     Reviewed and updated as needed this visit by clinical staff  Tobacco  Allergies  Meds  Problems  Med Hx  Surg Hx  Fam Hx          Reviewed and updated as needed this visit by Provider  Tobacco  Allergies  Meds  Problems  Med Hx  Surg Hx  Fam Hx         Past Medical History:   Diagnosis Date     Abnormal Pap smear of cervix     Was told Precancerous cells & HPV     History of anesthesia complications     slow wake up     Migraines      Moderate major depression (H) 2/19/2021     PCOS (polycystic ovarian syndrome)       Past Surgical History:   Procedure Laterality Date     BACK SURGERY Left 2003    L5 S1     ENDOMETRIAL ABLATION       GYNECOLOGIC CRYOSURGERY       HC REVISE MEDIAN N/CARPAL TUNNEL SURG Left 2/26/2021    Procedure: LEFT CARPAL TUNNEL RELEASE;  Surgeon: Anna Hameed MD;  Location: [facility] OR;  Service: Neurosurgery     LAPAROTOMY EXPLORATORY       LUMBAR DISCECTOMY Left 1/30/2015    Procedure: LEFT LUMBAR 4-5 MICRODISCECTOMY AND REVISION LEFT LUMBAR 5-SACRAL 1 MICRODISCECTOMY;  Surgeon: Mick Bolton MD;  Location: [facility] OR;  Service:      LUMBAR DISCECTOMY Left 1/30/2015    Procedure: LEFT  "L4-5 MICRODISCECTOMY AND REVISION LEFT L5-S1 MICRODISCECTOMY;  Surgeon: Mick Bolton MD;  Location: [facility] Main OR;  Service:      OTHER SURGICAL HISTORY Bilateral 2003    back surgeryDisc ectomy L5/S1     OK LAMNOTMY INCL W/DCMPRSN NRV ROOT 1 INTRSPC LUMBR Right 3/5/2020    Procedure: MICRODISCECTOMY RIGHT LUMBAR 4-5 LATERAL RECESS DECOMPRESSION RIGHT LUMBAR 4-5;  Surgeon: Mick Bolton MD;  Location: [facility] OR;  Service: Spine     OK LAMNOTMY INCL W/DCMPRSN NRV ROOT 1 INTRSPC LUMBR Right 6/3/2021    Procedure: RIGHT LUMBAR 4-5 REVISION MICRODISCECTOMY;  Surgeon: Mick Bolton MD;  Location: [facility] Main OR;  Service: Spine     SPINE SURGERY         Review of Systems       OBJECTIVE:   /70 (BP Location: Left arm, Patient Position: Sitting, Cuff Size: Adult Large)   Pulse 90   Ht 1.664 m (5' 5.5\")   Wt 82.1 kg (180 lb 14.4 oz)   SpO2 99%   BMI 29.65 kg/m    Physical Exam  GENERAL: healthy, alert and no distress  EYES: Eyes grossly normal to inspection, PERRL and conjunctivae and sclerae normal  HENT: ear canals and TM's normal, nose and mouth without ulcers or lesions  NECK: no adenopathy, no asymmetry, masses, or scars and thyroid normal to palpation  RESP: lungs clear to auscultation - no rales, rhonchi or wheezes  BREAST: normal without masses, tenderness or nipple discharge and no palpable axillary masses or adenopathy  CV: regular rate and rhythm, normal S1 S2, no S3 or S4, no murmur, click or rub, no peripheral edema and peripheral pulses strong  ABDOMEN: soft, nontender, no hepatosplenomegaly, no masses and bowel sounds normal   (female): deferred  MS: no gross musculoskeletal defects noted, no edema  SKIN: no suspicious lesions or rashes  PSYCH: mentation appears normal, affect normal/bright    Diagnostic Test Results:  Labs reviewed in [facility]      Counseling Resources:  ATP IV Guidelines  Pooled Cohorts Equation Calculator  Breast Cancer Risk " Calculator  BRCA-Related Cancer Risk Assessment: FHS-7 Tool  FRAX Risk Assessment  ICSI Preventive Guidelines  Dietary Guidelines for Americans, 2010  USDA's MyPlate  ASA Prophylaxis  Lung CA Screening    Orly Almanza MD  [facility]

## 2021-11-03 LAB — DEPRECATED CALCIDIOL+CALCIFEROL SERPL-MC: 85 UG/L (ref 30–80)

## 2022-01-12 VITALS — HEIGHT: 65 IN | WEIGHT: 173.5 LBS | BODY MASS INDEX: 28.91 KG/M2

## 2022-01-18 VITALS — HEIGHT: 66 IN | WEIGHT: 166 LBS | BODY MASS INDEX: 26.68 KG/M2

## 2022-01-18 VITALS
HEART RATE: 70 BPM | DIASTOLIC BLOOD PRESSURE: 75 MMHG | SYSTOLIC BLOOD PRESSURE: 112 MMHG | WEIGHT: 166 LBS | BODY MASS INDEX: 25.16 KG/M2 | HEIGHT: 68 IN | RESPIRATION RATE: 16 BRPM

## 2022-01-18 VITALS
HEART RATE: 87 BPM | OXYGEN SATURATION: 98 % | BODY MASS INDEX: 28.69 KG/M2 | DIASTOLIC BLOOD PRESSURE: 62 MMHG | TEMPERATURE: 98.6 F | HEIGHT: 66 IN | WEIGHT: 178.5 LBS | SYSTOLIC BLOOD PRESSURE: 106 MMHG

## 2022-01-18 VITALS — WEIGHT: 176 LBS | BODY MASS INDEX: 28.28 KG/M2 | HEIGHT: 66 IN

## 2022-01-18 VITALS
WEIGHT: 176 LBS | BODY MASS INDEX: 28.28 KG/M2 | HEART RATE: 88 BPM | HEIGHT: 66 IN | RESPIRATION RATE: 16 BRPM | DIASTOLIC BLOOD PRESSURE: 78 MMHG | OXYGEN SATURATION: 98 % | SYSTOLIC BLOOD PRESSURE: 115 MMHG

## 2022-01-18 VITALS
DIASTOLIC BLOOD PRESSURE: 66 MMHG | WEIGHT: 166.8 LBS | HEIGHT: 66 IN | HEART RATE: 80 BPM | SYSTOLIC BLOOD PRESSURE: 98 MMHG | BODY MASS INDEX: 26.81 KG/M2

## 2022-01-18 VITALS
WEIGHT: 175.9 LBS | BODY MASS INDEX: 28.27 KG/M2 | SYSTOLIC BLOOD PRESSURE: 102 MMHG | DIASTOLIC BLOOD PRESSURE: 70 MMHG | HEIGHT: 66 IN | HEART RATE: 80 BPM

## 2022-01-18 VITALS
DIASTOLIC BLOOD PRESSURE: 68 MMHG | WEIGHT: 166.5 LBS | HEART RATE: 76 BPM | SYSTOLIC BLOOD PRESSURE: 100 MMHG | BODY MASS INDEX: 27.08 KG/M2

## 2022-01-18 VITALS
RESPIRATION RATE: 18 BRPM | SYSTOLIC BLOOD PRESSURE: 113 MMHG | HEART RATE: 93 BPM | OXYGEN SATURATION: 98 % | DIASTOLIC BLOOD PRESSURE: 64 MMHG

## 2022-01-18 VITALS
DIASTOLIC BLOOD PRESSURE: 68 MMHG | SYSTOLIC BLOOD PRESSURE: 114 MMHG | WEIGHT: 183.4 LBS | HEART RATE: 68 BPM | BODY MASS INDEX: 28.09 KG/M2

## 2022-01-18 ASSESSMENT — PATIENT HEALTH QUESTIONNAIRE - PHQ9
SUM OF ALL RESPONSES TO PHQ QUESTIONS 1-9: 11
SUM OF ALL RESPONSES TO PHQ QUESTIONS 1-9: 3

## 2022-02-17 PROBLEM — G43.709 CHRONIC MIGRAINE WITHOUT AURA: Status: ACTIVE | Noted: 2020-11-25

## 2022-04-03 DIAGNOSIS — D06.9 HIGH GRADE SQUAMOUS INTRAEPITHELIAL LESION (HGSIL), GRADE 3 CIN, ON BIOPSY OF CERVIX: ICD-10-CM

## 2022-04-03 DIAGNOSIS — L70.0 ACNE VULGARIS: Primary | ICD-10-CM

## 2022-04-04 RX ORDER — SPIRONOLACTONE 50 MG/1
TABLET, FILM COATED ORAL
Qty: 90 TABLET | Refills: 2 | Status: SHIPPED | OUTPATIENT
Start: 2022-04-04 | End: 2023-01-04

## 2022-04-04 NOTE — TELEPHONE ENCOUNTER
"spironolactone (ALDACTONE) 50 MG tablet 90 tablet 3 3/7/2021  No   Sig: TAKE 1 TABLET BY MOUTH ONCE DAILY   Sent to pharmacy as: spironolactone 50 mg tablet (ALDACTONE)   E-Prescribing Status: Receipt confirmed by pharmacy (3/7/2021  4:27 PM CST)     Last Written Prescription Date:  3/7/2021  Last Fill Quantity: 90,  # refills: 3   Last office visit provider:  11/2/2021     Requested Prescriptions   Pending Prescriptions Disp Refills     spironolactone (ALDACTONE) 50 MG tablet [Pharmacy Med Name: SPIRONOLACTONE 50 MG TABLET] 30 tablet 1     Sig: TAKE 1 TABLET BY MOUTH ONCE DAILY       Diuretics (Including Combos) Protocol Passed - 4/3/2022  8:35 AM        Passed - Blood pressure under 140/90 in past 12 months     BP Readings from Last 3 Encounters:   11/02/21 110/70   09/01/21 117/78   05/27/21 106/62                 Passed - Recent (12 mo) or future (30 days) visit within the authorizing provider's specialty     Patient has had an office visit with the authorizing provider or a provider within the authorizing providers department within the previous 12 mos or has a future within next 30 days. See \"Patient Info\" tab in inbasket, or \"Choose Columns\" in Meds & Orders section of the refill encounter.              Passed - Medication is active on med list        Passed - Patient is age 18 or older        Passed - No active pregancy on record        Passed - Normal serum creatinine on file in past 12 months     Recent Labs   Lab Test 05/27/21  1458   CR 0.88              Passed - Normal serum potassium on file in past 12 months     Recent Labs   Lab Test 05/27/21  1458   POTASSIUM 4.4                    Passed - Normal serum sodium on file in past 12 months     Recent Labs   Lab Test 05/27/21  1458                 Passed - No positive pregnancy test in past 12 months             Ashley Red RN 04/04/22 3:47 PM  "

## 2022-05-05 ENCOUNTER — TRANSFERRED RECORDS (OUTPATIENT)
Dept: HEALTH INFORMATION MANAGEMENT | Facility: CLINIC | Age: 42
End: 2022-05-05
Payer: COMMERCIAL

## 2022-05-13 ENCOUNTER — TRANSFERRED RECORDS (OUTPATIENT)
Dept: HEALTH INFORMATION MANAGEMENT | Facility: CLINIC | Age: 42
End: 2022-05-13
Payer: COMMERCIAL

## 2022-07-07 ENCOUNTER — TRANSFERRED RECORDS (OUTPATIENT)
Dept: HEALTH INFORMATION MANAGEMENT | Facility: CLINIC | Age: 42
End: 2022-07-07

## 2022-07-14 ENCOUNTER — OFFICE VISIT (OUTPATIENT)
Dept: FAMILY MEDICINE | Facility: CLINIC | Age: 42
End: 2022-07-14
Payer: COMMERCIAL

## 2022-07-14 VITALS
HEART RATE: 72 BPM | SYSTOLIC BLOOD PRESSURE: 110 MMHG | HEIGHT: 66 IN | DIASTOLIC BLOOD PRESSURE: 72 MMHG | BODY MASS INDEX: 29.3 KG/M2 | WEIGHT: 182.3 LBS

## 2022-07-14 DIAGNOSIS — F32.0 MILD MAJOR DEPRESSION (H): ICD-10-CM

## 2022-07-14 DIAGNOSIS — K58.2 IRRITABLE BOWEL SYNDROME WITH BOTH CONSTIPATION AND DIARRHEA: ICD-10-CM

## 2022-07-14 DIAGNOSIS — Z97.5 IUD (INTRAUTERINE DEVICE) IN PLACE: ICD-10-CM

## 2022-07-14 DIAGNOSIS — Z00.01 ENCOUNTER FOR ROUTINE ADULT MEDICAL EXAM WITH ABNORMAL FINDINGS: Primary | ICD-10-CM

## 2022-07-14 DIAGNOSIS — Z13.228 SCREENING FOR METABOLIC DISORDER: ICD-10-CM

## 2022-07-14 DIAGNOSIS — Z12.4 SCREENING FOR CERVICAL CANCER: ICD-10-CM

## 2022-07-14 DIAGNOSIS — G43.709 CHRONIC MIGRAINE WITHOUT AURA WITHOUT STATUS MIGRAINOSUS, NOT INTRACTABLE: ICD-10-CM

## 2022-07-14 PROBLEM — R74.8 ELEVATED CREATINE KINASE LEVEL: Status: RESOLVED | Noted: 2020-11-25 | Resolved: 2022-07-14

## 2022-07-14 PROBLEM — R25.3 MUSCLE TWITCH: Status: RESOLVED | Noted: 2020-11-25 | Resolved: 2022-07-14

## 2022-07-14 LAB
CHOLEST SERPL-MCNC: 187 MG/DL
HBA1C MFR BLD: 5.1 % (ref 0–5.6)
HDLC SERPL-MCNC: 60 MG/DL
LDLC SERPL CALC-MCNC: 107 MG/DL
NONHDLC SERPL-MCNC: 127 MG/DL
TRIGL SERPL-MCNC: 99 MG/DL

## 2022-07-14 PROCEDURE — 96127 BRIEF EMOTIONAL/BEHAV ASSMT: CPT | Performed by: FAMILY MEDICINE

## 2022-07-14 PROCEDURE — G0145 SCR C/V CYTO,THINLAYER,RESCR: HCPCS | Performed by: FAMILY MEDICINE

## 2022-07-14 PROCEDURE — 36415 COLL VENOUS BLD VENIPUNCTURE: CPT | Performed by: FAMILY MEDICINE

## 2022-07-14 PROCEDURE — 87624 HPV HI-RISK TYP POOLED RSLT: CPT | Performed by: FAMILY MEDICINE

## 2022-07-14 PROCEDURE — 99214 OFFICE O/P EST MOD 30 MIN: CPT | Mod: 25 | Performed by: FAMILY MEDICINE

## 2022-07-14 PROCEDURE — G0124 SCREEN C/V THIN LAYER BY MD: HCPCS | Performed by: PATHOLOGY

## 2022-07-14 PROCEDURE — 80061 LIPID PANEL: CPT | Performed by: FAMILY MEDICINE

## 2022-07-14 PROCEDURE — 83036 HEMOGLOBIN GLYCOSYLATED A1C: CPT | Performed by: FAMILY MEDICINE

## 2022-07-14 ASSESSMENT — ENCOUNTER SYMPTOMS
HEARTBURN: 0
DYSURIA: 0
JOINT SWELLING: 0
FREQUENCY: 0
COUGH: 0
NERVOUS/ANXIOUS: 1
WEAKNESS: 0
MYALGIAS: 0
PALPITATIONS: 0
FEVER: 0
NAUSEA: 0
HEMATURIA: 0
BREAST MASS: 0
PARESTHESIAS: 0
ARTHRALGIAS: 0
SHORTNESS OF BREATH: 0
SORE THROAT: 0
HEMATOCHEZIA: 0
EYE PAIN: 0
HEADACHES: 0
CONSTIPATION: 0
DIZZINESS: 0
ABDOMINAL PAIN: 0
CHILLS: 0
DIARRHEA: 0

## 2022-07-14 ASSESSMENT — ANXIETY QUESTIONNAIRES
2. NOT BEING ABLE TO STOP OR CONTROL WORRYING: NOT AT ALL
4. TROUBLE RELAXING: NOT AT ALL
7. FEELING AFRAID AS IF SOMETHING AWFUL MIGHT HAPPEN: NOT AT ALL
GAD7 TOTAL SCORE: 0
3. WORRYING TOO MUCH ABOUT DIFFERENT THINGS: NOT AT ALL
5. BEING SO RESTLESS THAT IT IS HARD TO SIT STILL: NOT AT ALL
6. BECOMING EASILY ANNOYED OR IRRITABLE: NOT AT ALL
1. FEELING NERVOUS, ANXIOUS, OR ON EDGE: NOT AT ALL

## 2022-07-14 ASSESSMENT — PATIENT HEALTH QUESTIONNAIRE - PHQ9
SUM OF ALL RESPONSES TO PHQ QUESTIONS 1-9: 0
SUM OF ALL RESPONSES TO PHQ QUESTIONS 1-9: 0
10. IF YOU CHECKED OFF ANY PROBLEMS, HOW DIFFICULT HAVE THESE PROBLEMS MADE IT FOR YOU TO DO YOUR WORK, TAKE CARE OF THINGS AT HOME, OR GET ALONG WITH OTHER PEOPLE: NOT DIFFICULT AT ALL

## 2022-07-14 NOTE — PROGRESS NOTES
SUBJECTIVE:   CC: Larissa Elliott 42 year old   who presents for preventive health visit.       Patient has been advised of split billing requirements and indicates understanding: Yes    I spent 20 minutes with the patient total  from the the prevent visit, >50% of which was in counseling regarding the patient's medical issues as noted above.      Healthy Habits:     Getting at least 3 servings of Calcium per day:  Yes    Bi-annual eye exam:  Yes    Dental care twice a year:  Yes    Sleep apnea or symptoms of sleep apnea:  None    Diet:  Regular (no restrictions)    Frequency of exercise:  2-3 days/week    Duration of exercise:  45-60 minutes    Taking medications regularly:  Yes    Medication side effects:  None    PHQ-2 Total Score: 0    Additional concerns today:  No          Depression and Anxiety Follow-Up    How are you doing with your depression since your last visit? Improved     How are you doing with your anxiety since your last visit?  Improved     Are you having other symptoms that might be associated with depression or anxiety? Yes:  gastritis , IBS symptoms, wt gain    Have you had a significant life event? Relationship Concerns     Do you have any concerns with your use of alcohol or other drugs? No    Social History     Tobacco Use     Smoking status: Former Smoker     Smokeless tobacco: Never Used   Substance Use Topics     Alcohol use: Yes     Alcohol/week: 1.0 standard drink     Comment: Special occasions only     Drug use: No     PHQ 2/19/2021 11/2/2021 7/14/2022   PHQ-9 Total Score 3 4 0   Q9: Thoughts of better off dead/self-harm past 2 weeks Not at all Not at all Not at all     CLAYTON-7 SCORE 8/31/2020 2/19/2021   Total Score 20 7     Last PHQ-9 7/14/2022   1.  Little interest or pleasure in doing things 0   2.  Feeling down, depressed, or hopeless 0   3.  Trouble falling or staying asleep, or sleeping too much 0   4.  Feeling tired or having little energy 0   5.  Poor appetite or  overeating 0   6.  Feeling bad about yourself 0   7.  Trouble concentrating 0   8.  Moving slowly or restless 0   Q9: Thoughts of better off dead/self-harm past 2 weeks 0   PHQ-9 Total Score 0   Difficulty at work, home, or with people -     CLAYTON-7  2/19/2021   1. Feeling nervous, anxious, or on edge 1   2. Not being able to stop or control worrying 1   3. Worrying too much about different things 1   4. Trouble relaxing 1   5. Being so restless that it is hard to sit still 1   6. Becoming easily annoyed or irritable 1   7. Feeling afraid, as if something awful might happen 1   CLAYTON-7 Total Score 7   If you checked any problems, how difficult have they made it for you to do your work, take care of things at home, or get along with other people? Not difficult at all       Suicide Assessment Five-step Evaluation and Treatment (SAFE-T)    PHQ 2/19/2021 11/2/2021 7/14/2022   PHQ-9 Total Score 3 4 0   Q9: Thoughts of better off dead/self-harm past 2 weeks Not at all Not at all Not at all        Today's PHQ-2 Score:   PHQ-2 ( 1999 [employer]) 7/14/2022   Q1: Little interest or pleasure in doing things 0   Q2: Feeling down, depressed or hopeless 0   PHQ-2 Score 0   PHQ-2 Total Score (12-17 Years)- Positive if 3 or more points; Administer PHQ-A if positive -   Q1: Little interest or pleasure in doing things Not at all   Q2: Feeling down, depressed or hopeless Not at all   PHQ-2 Score 0       Abuse: Current or Past (Physical, Sexual or Emotional) - No  Do you feel safe in your environment? Yes    Have you ever done Advance Care Planning? (For example, a Health Directive, POLST, or a discussion with a medical provider or your loved ones about your wishes): No, advance care planning information given to patient to review.  Advanced care planning was discussed at today's visit.    Social History     Tobacco Use     Smoking status: Former Smoker     Smokeless tobacco: Never Used   Substance Use Topics     Alcohol use: Yes      "Alcohol/week: 1.0 standard drink     Comment: Special occasions only           ASSESSMENT/PLAN:   Larissa was seen today for physical.    Diagnoses and all orders for this visit:    Encounter for routine adult medical exam with abnormal findings    Irritable bowel syndrome with both constipation and diarrhea  Comments:  Had both upper and lower endoscopy on July 7.  Everything came back normal symptoms are more likely IBS does have appointment with nutritionist    IUD (intrauterine device) in place    Mild major depression (H)  Comments:  stable on current med    Screening for metabolic disorder  -     Hemoglobin A1c; Future  -     Lipid Profile; Future  -     Hemoglobin A1c  -     Lipid Profile    Screening for cervical cancer  -     Pap screen with HPV - recommended age 30 - 65 years    Chronic migraine without aura without status migrainosus, not intractable  Comments:  stable on topamax    Other orders  -     REVIEW OF HEALTH MAINTENANCE PROTOCOL ORDERS        Patient has been advised of split billing requirements and indicates understanding: Yes    COUNSELING:  Reviewed preventive health counseling, as reflected in patient instructions       Regular exercise       Healthy diet/nutrition       Vision screening       Hearing screening       Contraception       Advance Care Planning    Estimated body mass index is 29.87 kg/m  as calculated from the following:    Height as of this encounter: 1.664 m (5' 5.5\").    Weight as of this encounter: 82.7 kg (182 lb 4.8 oz).    Weight management plan: Discussed healthy diet and exercise guidelines    She reports that she has quit smoking. She has never used smokeless tobacco.      Reviewed orders with patient.  Reviewed health maintenance and updated orders accordingly - Yes  Lab work is in process  Labs reviewed in EPIC  BP Readings from Last 3 Encounters:   07/14/22 110/72   11/02/21 110/70   09/01/21 117/78    Wt Readings from Last 3 Encounters:   07/14/22 82.7 kg (182 lb " 4.8 oz)   11/02/21 82.1 kg (180 lb 14.4 oz)   09/01/21 80.7 kg (178 lb)                 Patient Active Problem List   Diagnosis     Chronic migraine     Foraminal stenosis of lumbar region     PCOS (polycystic ovarian syndrome)     Symptomatic varicose veins, bilateral     Mild major depression (H)     Carpal tunnel syndrome of left wrist     IUD (intrauterine device) in place     Past Surgical History:   Procedure Laterality Date     BACK SURGERY Left 2003    L5 S1     ENDOMETRIAL ABLATION       GYNECOLOGIC CRYOSURGERY       HC REVISE MEDIAN N/CARPAL TUNNEL SURG Left 2/26/2021    Procedure: LEFT CARPAL TUNNEL RELEASE;  Surgeon: Anna Hameed MD;  Location: [facility] Main OR;  Service: Neurosurgery     LAPAROTOMY EXPLORATORY       LUMBAR DISCECTOMY Left 1/30/2015    Procedure: LEFT LUMBAR 4-5 MICRODISCECTOMY AND REVISION LEFT LUMBAR 5-SACRAL 1 MICRODISCECTOMY;  Surgeon: Mick Bolton MD;  Location: [facility] Main OR;  Service:      LUMBAR DISCECTOMY Left 1/30/2015    Procedure: LEFT L4-5 MICRODISCECTOMY AND REVISION LEFT L5-S1 MICRODISCECTOMY;  Surgeon: Mick Bolton MD;  Location: [facility] Main OR;  Service:      OTHER SURGICAL HISTORY Bilateral 2003    back surgeryDisc ectomy L5/S1     [place] LAMNOTMY INCL W/DCMPRSN NRV ROOT 1 INTRSPC LUMBR Right 3/5/2020    Procedure: MICRODISCECTOMY RIGHT LUMBAR 4-5 LATERAL RECESS DECOMPRESSION RIGHT LUMBAR 4-5;  Surgeon: Mick Bolton MD;  Location: [facility] Main OR;  Service: Spine     [place] LAMNOTMY INCL W/DCMPRSN NRV ROOT 1 INTRSPC LUMBR Right 6/3/2021    Procedure: RIGHT LUMBAR 4-5 REVISION MICRODISCECTOMY;  Surgeon: Mick Bolton MD;  Location: [facility] Main OR;  Service: Spine     SPINE SURGERY         Social History     Tobacco Use     Smoking status: Former Smoker     Smokeless tobacco: Never Used   Substance Use Topics     Alcohol use: Yes     Alcohol/week: 1.0 standard drink     Comment: Special occasions only      Family History   Problem Relation Age of Onset     Migraines Father      Migraines Brother      Seizure Disorder Brother      Asthma Father      Sarcoidosis Father      Asthma Brother      Breast Cancer Maternal Grandmother         Premenopausal     Chronic Obstructive Pulmonary Disease Paternal Grandfather      Psoriasis Mother            Current Outpatient Medications   Medication Sig Dispense Refill     acetaminophen (TYLENOL) 500 MG tablet Take 500 mg by mouth       cholecalciferol 25 MCG (1000 UT) TABS Take 2,000 Units by mouth       escitalopram (LEXAPRO) 10 MG tablet Take 5 mg by mouth        levonorgestrel (MIRENA) 20 MCG/DAY IUD 1 each by Intrauterine route once Placed in 2020       Multiple Vitamins-Minerals (MULTIVITAMIN ADULT PO)        spironolactone (ALDACTONE) 50 MG tablet TAKE 1 TABLET BY MOUTH ONCE DAILY 90 tablet 2     SUMAtriptan (IMITREX) 50 MG tablet Take 1 tablet (50 mg) by mouth at onset of headache for migraine May repeat in 2 hours. Max 2 tablets/24 hours. 18 tablet 11     topiramate (TOPAMAX) 25 MG tablet Take 1 tablet (25 mg) by mouth daily 90 tablet 3     gabapentin (NEURONTIN) 100 MG capsule Take 1 capsule (100 mg) by mouth 3 times daily (Patient taking differently: Take 100 mg by mouth daily) 270 capsule 4     Allergies   Allergen Reactions     Latex Rash       Recent Labs   Lab Test 07/14/22  1550 11/02/21  1121 05/27/21  1458 04/20/21  1039 02/19/21  1017 02/26/20  1413 12/05/19  0837 07/19/19  1013 01/09/19  0829 01/09/19  0829   A1C 5.1 5.3  --   --   --   --  5.0 5.4   < >  --    LDL  --  143*  --   --   --  94  --  133*  --  117   HDL  --  53  --   --   --  40*  --  52  --  52   TRIG  --  91  --   --   --  127  --  101  --  77   ALT  --   --   --   --   --   --  20  --   --  39   CR  --   --  0.88 0.86   < > 0.75 0.93  --   --  0.84   GFRESTIMATED  --   --  >60 >60   < > >60 >60  --   --  >60   GFRESTBLACK  --   --  >60 >60   < > >60 >60  --   --  >60   POTASSIUM  --   --  4.4 4.6    < > 3.9 4.3  --   --  4.6   TSH  --  1.07  --   --   --   --  0.87 1.49  --  0.93    < > = values in this interval not displayed.        Breast Cancer Screening:  Any new diagnosis of family breast, ovarian, or bowel cancer? No    Pertinent mammograms are reviewed under the imaging tab.    History of abnormal Pap smear: NO - age 30-65 PAP every 5 years with negative HPV co-testing recommended     Reviewed and updated as needed this visit by clinical staff   Tobacco  Allergies  Meds  Problems               Reviewed and updated as needed this visit by Provider     Meds  Problems              Past Medical History:   Diagnosis Date     Abnormal Pap smear of cervix     Was told Precancerous cells & HPV     History of anesthesia complications     slow wake up     Migraines      Moderate major depression (H) 2/19/2021     PCOS (polycystic ovarian syndrome)       Past Surgical History:   Procedure Laterality Date     BACK SURGERY Left 2003    L5 S1     ENDOMETRIAL ABLATION       GYNECOLOGIC CRYOSURGERY       HC REVISE MEDIAN N/CARPAL TUNNEL SURG Left 2/26/2021    Procedure: LEFT CARPAL TUNNEL RELEASE;  Surgeon: Anna Hameed MD;  [place] [facility] [place];  Service: Neurosurgery     LAPAROTOMY EXPLORATORY       LUMBAR DISCECTOMY Left 1/30/2015    Procedure: LEFT LUMBAR 4-5 MICRODISCECTOMY AND REVISION LEFT LUMBAR 5-SACRAL 1 MICRODISCECTOMY;  Surgeon: Mick Bolton MD;  Location: [facility] OR;  Service:      LUMBAR DISCECTOMY Left 1/30/2015    Procedure: LEFT L4-5 MICRODISCECTOMY AND REVISION LEFT L5-S1 MICRODISCECTOMY;  Surgeon: Mick Bolton MD;  Location: [facility];  Service:      OTHER SURGICAL HISTORY Bilateral 2003    back surgeryDisc ectomy L5/S1     [place] LAMNOTMY INCL W/DCMPRSN NRV ROOT 1 INTRSPC LUMBR Right 3/5/2020    Procedure: MICRODISCECTOMY RIGHT LUMBAR 4-5 LATERAL RECESS DECOMPRESSION RIGHT LUMBAR 4-5;  Surgeon: Mick Bolton MD;  Location:  "[facility] OR;  Service: Spine     [place] LAMNOTMY INCL W/DCMPRSN NRV ROOT 1 INTRSPC LUMBR Right 6/3/2021    Procedure: RIGHT LUMBAR 4-5 REVISION MICRODISCECTOMY;  Surgeon: Mick Bolton MD;  Location: [facility];  Service: Spine     SPINE SURGERY         Review of Systems   Constitutional: Negative for chills and fever.   HENT: Negative for congestion, ear pain, hearing loss and sore throat.    Eyes: Negative for pain and visual disturbance.   Respiratory: Negative for cough and shortness of breath.    Cardiovascular: Negative for chest pain, palpitations and peripheral edema.   Gastrointestinal: Negative for abdominal pain, constipation, diarrhea, heartburn, hematochezia and nausea.   Breasts:  Negative for tenderness, breast mass and discharge.   Genitourinary: Negative for dysuria, frequency, genital sores, hematuria, pelvic pain, urgency, vaginal bleeding and vaginal discharge.   Musculoskeletal: Negative for arthralgias, joint swelling and myalgias.   Skin: Negative for rash.   Neurological: Negative for dizziness, weakness, headaches and paresthesias.   Psychiatric/Behavioral: Negative for mood changes. The patient is nervous/anxious.           OBJECTIVE:   /72 (BP Location: Left arm, Patient Position: Sitting, Cuff Size: Adult Regular)   Pulse 72   Ht 1.664 m (5' 5.5\")   Wt 82.7 kg (182 lb 4.8 oz)   LMP  (LMP Unknown)   Breastfeeding No   BMI 29.87 kg/m    Physical Exam  GENERAL: healthy, alert and no distress  EYES: Eyes grossly normal to inspection, PERRL and conjunctivae and sclerae normal  HENT: ear canals and TM's normal, nose and mouth without ulcers or lesions  NECK: no adenopathy, no asymmetry, masses, or scars and thyroid normal to palpation  RESP: lungs clear to auscultation - no rales, rhonchi or wheezes  BREAST: normal without masses, tenderness or nipple discharge and no palpable axillary masses or adenopathy  CV: regular rate and rhythm, normal S1 S2, no S3 or " S4, no murmur, click or rub, no peripheral edema and peripheral pulses strong  ABDOMEN: soft, nontender, no hepatosplenomegaly, no masses and bowel sounds normal   (female): normal female external genitalia, normal urethral meatus , vaginal mucosa pink, moist, well rugated and normal cervix, adnexae, and uterus without masses.  MS: no gross musculoskeletal defects noted, no edema  SKIN: no suspicious lesions or rashes  PSYCH: mentation appears normal, affect normal/bright    Diagnostic Test Results:  Labs reviewed in [facility]      Counseling Resources:  ATP IV Guidelines  Pooled Cohorts Equation Calculator  Breast Cancer Risk Calculator  BRCA-Related Cancer Risk Assessment: FHS-7 Tool  FRAX Risk Assessment  ICSI Preventive Guidelines  Dietary Guidelines for Americans, 2010  [employer] MyPlate  ASA Prophylaxis  Lung CA Screening    Orly Almanza MD  [facility]  Answers for HPI/ROS submitted by the patient on 7/14/2022  If you checked off any problems, how difficult have these problems made it for you to do your work, take care of things at home, or get along with other people?: Not difficult at all  PHQ9 TOTAL SCORE: 0

## 2022-07-20 LAB
BKR LAB AP GYN ADEQUACY: ABNORMAL
BKR LAB AP GYN INTERPRETATION: ABNORMAL
BKR LAB AP HPV REFLEX: ABNORMAL
BKR LAB AP PREVIOUS ABNORMAL: ABNORMAL
PATH REPORT.COMMENTS IMP SPEC: ABNORMAL
PATH REPORT.COMMENTS IMP SPEC: ABNORMAL
PATH REPORT.RELEVANT HX SPEC: ABNORMAL

## 2022-07-22 ENCOUNTER — PATIENT OUTREACH (OUTPATIENT)
Dept: FAMILY MEDICINE | Facility: CLINIC | Age: 42
End: 2022-07-22

## 2022-07-22 LAB
HUMAN PAPILLOMA VIRUS 16 DNA: NEGATIVE
HUMAN PAPILLOMA VIRUS 18 DNA: NEGATIVE
HUMAN PAPILLOMA VIRUS FINAL DIAGNOSIS: ABNORMAL
HUMAN PAPILLOMA VIRUS OTHER HR: POSITIVE

## 2022-07-25 ASSESSMENT — ANXIETY QUESTIONNAIRES: GAD7 TOTAL SCORE: 0

## 2022-08-17 ENCOUNTER — OFFICE VISIT (OUTPATIENT)
Dept: FAMILY MEDICINE | Facility: CLINIC | Age: 42
End: 2022-08-17
Payer: COMMERCIAL

## 2022-08-17 VITALS
HEART RATE: 76 BPM | BODY MASS INDEX: 30.12 KG/M2 | SYSTOLIC BLOOD PRESSURE: 124 MMHG | DIASTOLIC BLOOD PRESSURE: 76 MMHG | WEIGHT: 183.8 LBS

## 2022-08-17 DIAGNOSIS — R87.613 PAPANICOLAOU SMEAR OF CERVIX WITH HIGH GRADE SQUAMOUS INTRAEPITHELIAL LESION (HGSIL): Primary | ICD-10-CM

## 2022-08-17 PROCEDURE — 88305 TISSUE EXAM BY PATHOLOGIST: CPT | Performed by: PATHOLOGY

## 2022-08-17 PROCEDURE — 99207 PR NO CHARGE LOS: CPT | Performed by: FAMILY MEDICINE

## 2022-08-17 PROCEDURE — 57454 BX/CURETT OF CERVIX W/SCOPE: CPT | Performed by: FAMILY MEDICINE

## 2022-08-17 NOTE — PROGRESS NOTES
Larissa Elliott is a .42 year old female who presents for repeat colposcopy, referred by Orly Almanza  for abnormal pap smear The prior pap showed :    Pt reported hx of precancerous cell and +  HPV  9/10/15 [facility] pap, Neg HPV  2019 [facility] pap  2021 [facility] pap, + HR HPV  2021 [place]: neg. With Dr Claudia [facility]  7/14/22 [facility], +HR HPV, not 16/18. Plan [place] bef 10/14/22          Previous history of abnormal paps?: Yes   History of cryotherapy (freezing)?: : No  History of veneral diseases: : No  Do you desire testing for any of these diseases? : No  History of genital warts:  No  Visible warts now?:  No  Previously treated? If so, how?:  No     No LMP recorded (lmp unknown). (Menstrual status: IUD).    Type of contraception: IUD Mirena  Age at first sexual intercourse: <18  Number of sexual partners (lifetime): <3  History   Smoking Status     Former Smoker   Smokeless Tobacco     Never Used       History of sexual abuse:  No    Allergies   Allergen Reactions     Adhesive Tape Rash     Latex Rash       PROCEDURE:  Before the procedure, it was ensured that the patient was educated regarding the nature of her findings to date, the implications of them, and what was to be done. She has been made aware of the role of HPV, the natural history of infection, ways to minimize her future risk, the effect of HPV on the cervix, and treatment options available should they be indicated. The   pathophysiology of the cervix, including a discussion of squamous vs. endometrial cells, and squamous metaplasia have all been reviewed, using illustrations and sketches. The details of the colposcopic procedure were reviewed, as well as the risks of missed diagnoses, pain, infection and bleeding. All questions were answered before proceeding, and informed consent was therefore obtained.    Bimanual examination: was performed and was unremarkable.    The following examination was done with the colposcope:    Unenhanced examination of the cervix  was normal pap wasn't repeated today     Pap repeated?:  No  SCJ seen?:  yes  Endocervical speculum needed?:  Yes   ECC done?:  Yes   Lugol's solution used?:  Yes   Satisfactory examination?:  yes    Vaginal vault: normal to cursory inspection   Urethra normal?:  yes  Labia normal?:  yes  Perineum normal?:  yes  Rectum normal?:  yes    FINDINGS:    Cervix: acetowhitening noted 10-2, acetowhite area from 10:00 to 2:00, HPV effect at 10:00 -2:00and punctation noted at 10-2:00  Procedure: biopsies taken (not including ECC): 3.    Procedure summary: Patient tolerated procedure well     Assessment: HPV related changes and HARITHA 1      Plan: Specimens labelled and sent to pathology., Will base further treatment on pathology findings., treatment options discussed with patient, post biopsy instructions given to patient, and call to discuss Pathology results    Orly Almanza MD 8/17/2022 7:42 AM   [facility].  [phone]

## 2022-08-19 LAB
PATH REPORT.COMMENTS IMP SPEC: NORMAL
PATH REPORT.FINAL DX SPEC: NORMAL
PATH REPORT.GROSS SPEC: NORMAL
PATH REPORT.MICROSCOPIC SPEC OTHER STN: NORMAL
PATH REPORT.RELEVANT HX SPEC: NORMAL
PHOTO IMAGE: NORMAL

## 2022-08-24 NOTE — TELEPHONE ENCOUNTER
E-prescribing error on 08/22/2022 for spironolactone 50mg. Writer called Pharmacy. Confirmed pt still has 120 tabs left on their refill. Will disregard e-prescribe error.    Alana Mcmullen RN

## 2022-09-04 DIAGNOSIS — G43.809 OTHER MIGRAINE WITHOUT STATUS MIGRAINOSUS, NOT INTRACTABLE: ICD-10-CM

## 2022-09-06 RX ORDER — TOPIRAMATE 25 MG/1
TABLET, FILM COATED ORAL
Qty: 30 TABLET | Refills: 0 | Status: SHIPPED | OUTPATIENT
Start: 2022-09-06 | End: 2022-09-13

## 2022-09-06 NOTE — TELEPHONE ENCOUNTER
Refill request for topiramate 25mg  Last follow-up 9//1/21; Pt due for follow-up will send message via [employer].  Medication T'd for review and signature  JULIO Potts ATC on 9/6/2022 at 8:01 AM    topiramate (TOPAMAX) 25 MG tablet 90 tablet 3 9/1/2021  No   Sig - Route: Take 1 tablet (25 mg) by mouth daily - Oral

## 2022-09-07 ENCOUNTER — LAB REQUISITION (OUTPATIENT)
Dept: LAB | Facility: CLINIC | Age: 42
End: 2022-09-07

## 2022-09-07 ENCOUNTER — TRANSFERRED RECORDS (OUTPATIENT)
Dept: HEALTH INFORMATION MANAGEMENT | Facility: CLINIC | Age: 42
End: 2022-09-07

## 2022-09-07 DIAGNOSIS — R87.613 HIGH GRADE SQUAMOUS INTRAEPITHELIAL LESION ON CYTOLOGIC SMEAR OF CERVIX (HGSIL): ICD-10-CM

## 2022-09-07 PROCEDURE — 87624 HPV HI-RISK TYP POOLED RSLT: CPT | Performed by: OBSTETRICS & GYNECOLOGY

## 2022-09-13 ENCOUNTER — PATIENT OUTREACH (OUTPATIENT)
Dept: FAMILY MEDICINE | Facility: CLINIC | Age: 42
End: 2022-09-13

## 2022-09-13 RX ORDER — TOPIRAMATE 25 MG/1
25 TABLET, FILM COATED ORAL DAILY
Qty: 90 TABLET | Refills: 0 | Status: SHIPPED | OUTPATIENT
Start: 2022-09-13 | End: 2022-12-22

## 2022-09-13 NOTE — TELEPHONE ENCOUNTER
Refill request for Topamax  Next follow up 1/26/23  Medication T'd for review and signature  Felicitas Babb CMA on 9/13/2022 at 9:17 AM

## 2022-09-13 NOTE — TELEPHONE ENCOUNTER
Health Call Center    Phone Message    May a detailed message be left on voicemail: yes     Reason for Call: Medication Question or concern regarding medication   Prescription Clarification  Name of Medication: topiramate (TOPAMAX) 25 MG tablet, SUMAtriptan (IMITREX) 50 MG tablet   Prescribing Provider: Rj Lopes MD   Pharmacy: [facility]/pharmacy #[id number] [place], [place] - [address] [place] [address] & [address]    What on the order needs clarification? Pt was told via [employer] message that pt could not get her refills until she is seen by Dr. Lopes. Pt scheduled appt with Dr. Lopes for 01/26 (his 1st available). Pt can't wait until January for her medications. She is out. If Dr. Lopes wants pt to be seen sooner, then he would have to work pt in for a sooner appt. Otherwise she would like her Rx's sent to her pharmacy today. Please advise.      Action Taken: Message routed to:  Other: [facility]    Travel Screening: Not Applicable

## 2022-09-19 ENCOUNTER — OFFICE VISIT (OUTPATIENT)
Dept: FAMILY MEDICINE | Facility: CLINIC | Age: 42
End: 2022-09-19
Payer: COMMERCIAL

## 2022-09-19 VITALS
HEIGHT: 66 IN | WEIGHT: 185.13 LBS | SYSTOLIC BLOOD PRESSURE: 101 MMHG | DIASTOLIC BLOOD PRESSURE: 51 MMHG | BODY MASS INDEX: 29.75 KG/M2 | HEART RATE: 78 BPM | OXYGEN SATURATION: 97 %

## 2022-09-19 DIAGNOSIS — R87.613 PAPANICOLAOU SMEAR OF CERVIX WITH HIGH GRADE SQUAMOUS INTRAEPITHELIAL LESION (HGSIL): ICD-10-CM

## 2022-09-19 DIAGNOSIS — Z01.818 PREOP EXAMINATION: Primary | ICD-10-CM

## 2022-09-19 DIAGNOSIS — Z97.5 IUD (INTRAUTERINE DEVICE) IN PLACE: ICD-10-CM

## 2022-09-19 PROBLEM — N80.9 ENDOMETRIOSIS: Status: ACTIVE | Noted: 2022-09-19

## 2022-09-19 LAB
ERYTHROCYTE [DISTWIDTH] IN BLOOD BY AUTOMATED COUNT: 11.8 % (ref 10–15)
HCT VFR BLD AUTO: 37.2 % (ref 35–47)
HGB BLD-MCNC: 12.7 G/DL (ref 11.7–15.7)
INR PPP: 1.04 (ref 0.85–1.15)
MCH RBC QN AUTO: 32.6 PG (ref 26.5–33)
MCHC RBC AUTO-ENTMCNC: 34.1 G/DL (ref 31.5–36.5)
MCV RBC AUTO: 96 FL (ref 78–100)
PLATELET # BLD AUTO: 241 10E3/UL (ref 150–450)
RBC # BLD AUTO: 3.89 10E6/UL (ref 3.8–5.2)
WBC # BLD AUTO: 4.8 10E3/UL (ref 4–11)

## 2022-09-19 PROCEDURE — 99214 OFFICE O/P EST MOD 30 MIN: CPT | Performed by: FAMILY MEDICINE

## 2022-09-19 PROCEDURE — 36415 COLL VENOUS BLD VENIPUNCTURE: CPT | Performed by: FAMILY MEDICINE

## 2022-09-19 PROCEDURE — 85027 COMPLETE CBC AUTOMATED: CPT | Performed by: FAMILY MEDICINE

## 2022-09-19 PROCEDURE — 85610 PROTHROMBIN TIME: CPT | Performed by: FAMILY MEDICINE

## 2022-09-19 NOTE — PROGRESS NOTES
[facility]  [address] [place] [address]  Phone: [phone]  Fax: [phone]  Primary Provider: Orly Almanza  Pre-op Performing Provider: ORLY ALMANZA      PREOPERATIVE EVALUATION:  Today's date: 9/19/2022    Larissa Elliott is a 42 year old female who presents for a preoperative evaluation.    Surgical Information:  Surgery/Procedure: LOOP ELECTROSURGICAL EXCISION PROCEDURE (LEEP)  Surgery Location: [facility]   Surgeon: Dr. Uyen Forbes  Surgery Date: 09/26/22  Time of Surgery: 7:30 am  Where patient plans to recover: At home with family  Fax number for surgical facility:     Type of Anesthesia Anticipated: Local with MAC    Larissa was seen today for pre-op exam.    Diagnoses and all orders for this visit:    Preop examination  Comments:  There for the surgery, patient will do home COVID rapid test and take a photo of the test prior to her procedure  Orders:  -     CBC with platelets; Future  -     INR; Future  -     CBC with platelets  -     INR    Papanicolaou smear of cervix with high grade squamous intraepithelial lesion (HGSIL)  Comments:  plan to do LEEP procedure     IUD (intrauterine device) in place    Other orders  -     INFLUENZA VACCINE IM > 6 MONTHS VALENT IIV4 (AFLURIA/FLUZONE); Future        Subjective     HPI related to upcoming procedure: LEEP procedure for high-grade lesion on cervical Pap smear    Preop Questions 9/19/2022   1. Have you ever had a heart attack or stroke? No   2. Have you ever had surgery on your heart or blood vessels, such as a stent placement, a coronary artery bypass, or surgery on an artery in your head, neck, heart, or legs? No   3. Do you have chest pain with activity? No   4. Do you have a history of  heart failure? No   5. Do you currently have a cold, bronchitis or symptoms of other infection? No   6. Do you have a cough, shortness of breath, or wheezing? No   7. Do you or anyone in your family have previous  history of blood clots? No   8. Do you or does anyone in your family have a serious bleeding problem such as prolonged bleeding following surgeries or cuts? No   9. Have you ever had problems with anemia or been told to take iron pills? No   10. Have you had any abnormal blood loss such as black, tarry or bloody stools, or abnormal vaginal bleeding? No   11. Have you ever had a blood transfusion? No   12. Are you willing to have a blood transfusion if it is medically needed before, during, or after your surgery? Yes   13. Have you or any of your relatives ever had problems with anesthesia? No   14. Do you have sleep apnea, excessive snoring or daytime drowsiness? No   15. Do you have any artifical heart valves or other implanted medical devices like a pacemaker, defibrillator, or continuous glucose monitor? No   16. Do you have artificial joints? No   17. Are you allergic to latex? YES:    18. Is there any chance that you may be pregnant? No       Health Care Directive:  Patient does not have a Health Care Directive or Living Will: Discussed advance care planning with patient; information given to patient to review.    Preoperative Review of :   reviewed - no record of controlled substances prescribed.      Status of Chronic Conditions:  See problem list for active medical problems.  Problems all longstanding and stable, except as noted/documented.  See ROS for pertinent symptoms related to these conditions.      Review of Systems  CONSTITUTIONAL: NEGATIVE for fever, chills, change in weight  ENT/MOUTH: NEGATIVE for ear, mouth and throat problems  RESP: NEGATIVE for significant cough or SOB  CV: NEGATIVE for chest pain, palpitations or peripheral edema    Patient Active Problem List    Diagnosis Date Noted     Papanicolaou smear of cervix with high grade squamous intraepithelial lesion (HGSIL) 07/14/2022     Priority: High     Pt reported hx of precancerous cell and +  HPV  9/10/15 NIL pap, Neg HPV  2019 NIL  pap  2021 HSIL pap, + HR HPV  2021 [place]: neg. With Dr Claudia [facility]  7/14/22 HSIL, +HR HPV, not 16/18. Plan [place] bef 10/14/22  08/17/22 [place] Bx HARITHA III ECC No HARITHA. Plan LEEP due bef 11/17/22.  Provider released the results and recommendations to the pt through [email or web address], pt viewed.          Endometriosis 09/19/2022     Priority: Medium     IUD (intrauterine device) in place 11/02/2021     Priority: Medium     Mild major depression (H) 05/27/2021     Priority: Medium     Carpal tunnel syndrome of left wrist 01/29/2021     Priority: Medium     Formatting of this note might be different from the original.  Added automatically from request for surgery [id number]       Chronic migraine 11/25/2020     Priority: Medium     Replacing diagnoses that were inactivated after the 10/1/2021 regulatory import.       Foraminal stenosis of lumbar region 11/25/2020     Priority: Medium     PCOS (polycystic ovarian syndrome) 07/19/2019     Priority: Medium     Symptomatic varicose veins, bilateral 09/15/2015     Priority: Medium      Past Medical History:   Diagnosis Date     Abnormal Pap smear of cervix     Was told Precancerous cells & HPV     History of anesthesia complications     slow wake up     Migraines      Moderate major depression (H) 2/19/2021     PCOS (polycystic ovarian syndrome)      Past Surgical History:   Procedure Laterality Date     BACK SURGERY Left 2003    L5 S1     ENDOMETRIAL ABLATION       GYNECOLOGIC CRYOSURGERY       HC REVISE MEDIAN N/CARPAL TUNNEL SURG Left 2/26/2021    Procedure: LEFT CARPAL TUNNEL RELEASE;  Surgeon: Anna Hameed MD;  Location: [facility] OR;  Service: Neurosurgery     LAPAROTOMY EXPLORATORY       LUMBAR DISCECTOMY Left 1/30/2015    Procedure: LEFT LUMBAR 4-5 MICRODISCECTOMY AND REVISION LEFT LUMBAR 5-SACRAL 1 MICRODISCECTOMY;  Surgeon: Mick Bolton MD;  Location: [facility] OR;  Service:      LUMBAR DISCECTOMY Left 1/30/2015    Procedure: LEFT L4-5  MICRODISCECTOMY AND REVISION LEFT L5-S1 MICRODISCECTOMY;  Surgeon: Mick Bolton MD;  Location: [facility] Main OR;  Service:      OTHER SURGICAL HISTORY Bilateral 2003    back surgeryDisc ectomy L5/S1     ME LAMNOTMY INCL W/DCMPRSN NRV ROOT 1 INTRSPC LUMBR Right 3/5/2020    Procedure: MICRODISCECTOMY RIGHT LUMBAR 4-5 LATERAL RECESS DECOMPRESSION RIGHT LUMBAR 4-5;  Surgeon: Mick Bolton MD;  Location: [facility] Main OR;  Service: Spine     ME LAMNOTMY INCL W/DCMPRSN NRV ROOT 1 INTRSPC LUMBR Right 6/3/2021    Procedure: RIGHT LUMBAR 4-5 REVISION MICRODISCECTOMY;  Surgeon: Mick Bolton MD;  Location: [facility] Main OR;  Service: Spine     SPINE SURGERY       Current Outpatient Medications   Medication Sig Dispense Refill     acetaminophen (TYLENOL) 500 MG tablet Take 500 mg by mouth       cholecalciferol 25 MCG (1000 UT) TABS Take 2,000 Units by mouth       levonorgestrel (MIRENA) 20 MCG/DAY IUD 1 each by Intrauterine route once Placed in 2020       Multiple Vitamins-Minerals (MULTIVITAMIN ADULT PO)        spironolactone (ALDACTONE) 50 MG tablet TAKE 1 TABLET BY MOUTH ONCE DAILY 90 tablet 2     SUMAtriptan (IMITREX) 50 MG tablet Take 1 tablet (50 mg) by mouth at onset of headache for migraine May repeat in 2 hours. Max 2 tablets/24 hours. 18 tablet 11     topiramate (TOPAMAX) 25 MG tablet Take 1 tablet (25 mg) by mouth daily 90 tablet 0     gabapentin (NEURONTIN) 100 MG capsule Take 1 capsule (100 mg) by mouth 3 times daily (Patient taking differently: Take 100 mg by mouth daily) 270 capsule 4       Allergies   Allergen Reactions     Adhesive Tape Rash     Latex Rash        Social History     Tobacco Use     Smoking status: Former Smoker     Smokeless tobacco: Never Used   Substance Use Topics     Alcohol use: Yes     Alcohol/week: 1.0 standard drink     Comment: Special occasions only     Family History   Problem Relation Age of Onset     Migraines Father      Migraines Brother   "    Seizure Disorder Brother      Asthma Father      Sarcoidosis Father      Asthma Brother      Breast Cancer Maternal Grandmother         Premenopausal     Chronic Obstructive Pulmonary Disease Paternal Grandfather      Psoriasis Mother      History   Drug Use No         Objective     /51 (BP Location: Left arm, Patient Position: Sitting, Cuff Size: Adult Large)   Pulse 78   Ht 1.664 m (5' 5.5\")   Wt 84 kg (185 lb 2 oz)   SpO2 97%   BMI 30.34 kg/m      Physical Exam  GENERAL APPEARANCE: healthy, alert and no distress  HENT: ear canals and TM's normal and nose and mouth without ulcers or lesions  RESP: lungs clear to auscultation - no rales, rhonchi or wheezes  CV: regular rate and rhythm, normal S1 S2, no S3 or S4 and no murmur, click or rub   ABDOMEN: soft, nontender, no HSM or masses and bowel sounds normal  NEURO: Normal strength and tone, sensory exam grossly normal, mentation intact and speech normal    Recent Labs   Lab Test 07/14/22  1550 11/02/21  1121 06/03/21  0703 05/27/21  1458 05/27/21  1458 04/20/21  1039 02/19/21  1017   HGB  --  13.4 13.2 13.3  --  13.4 13.6   PLT  --  254 270 249  --  261 251   INR  --   --   --   --  1.02  --  0.99   NA  --   --   --  140  --  139 139   POTASSIUM  --   --   --  4.4  --  4.6 4.2   CR  --   --   --  0.88  --  0.86 0.77   A1C 5.1 5.3  --   --   --   --   --         Diagnostics:  Labs pending at this time.  Results will be reviewed when available.   No EKG required for low risk surgery (cataract, skin procedure, breast biopsy, etc).    Revised Cardiac Risk Index (RCRI):  The patient has the following serious cardiovascular risks for perioperative complications:   - No serious cardiac risks = 0 points     RCRI Interpretation: 0 points: Class I (very low risk - 0.4% complication rate)           Signed Electronically by: Orly Almanza MD  Copy of this evaluation report is provided to requesting physician.      "

## 2022-09-23 ENCOUNTER — ANESTHESIA EVENT (OUTPATIENT)
Dept: SURGERY | Facility: AMBULATORY SURGERY CENTER | Age: 42
End: 2022-09-23
Payer: COMMERCIAL

## 2022-09-24 ENCOUNTER — HEALTH MAINTENANCE LETTER (OUTPATIENT)
Age: 42
End: 2022-09-24

## 2022-09-26 ENCOUNTER — HOSPITAL ENCOUNTER (OUTPATIENT)
Facility: AMBULATORY SURGERY CENTER | Age: 42
Discharge: HOME | End: 2022-09-26
Attending: OBSTETRICS & GYNECOLOGY
Payer: COMMERCIAL

## 2022-09-26 ENCOUNTER — ANESTHESIA (OUTPATIENT)
Dept: SURGERY | Facility: AMBULATORY SURGERY CENTER | Age: 42
End: 2022-09-26
Payer: COMMERCIAL

## 2022-09-26 ENCOUNTER — TRANSFERRED RECORDS (OUTPATIENT)
Dept: HEALTH INFORMATION MANAGEMENT | Facility: CLINIC | Age: 42
End: 2022-09-26

## 2022-09-26 VITALS
WEIGHT: 185.19 LBS | RESPIRATION RATE: 16 BRPM | SYSTOLIC BLOOD PRESSURE: 117 MMHG | BODY MASS INDEX: 29.76 KG/M2 | DIASTOLIC BLOOD PRESSURE: 54 MMHG | HEIGHT: 66 IN | HEART RATE: 69 BPM | TEMPERATURE: 97.1 F | OXYGEN SATURATION: 97 %

## 2022-09-26 DIAGNOSIS — D06.9 CARCINOMA IN SITU OF CERVIX UTERI: ICD-10-CM

## 2022-09-26 LAB
HCG UR QL: NEGATIVE
INTERNAL QC OK POCT: NORMAL
POCT KIT EXPIRATION DATE: NORMAL
POCT KIT LOT NUMBER: NORMAL

## 2022-09-26 DEVICE — IMPLANTABLE DEVICE: Type: IMPLANTABLE DEVICE | Site: CERVIX | Status: FUNCTIONAL

## 2022-09-26 RX ORDER — FENTANYL CITRATE 0.05 MG/ML
50 INJECTION, SOLUTION INTRAMUSCULAR; INTRAVENOUS
Status: DISCONTINUED | OUTPATIENT
Start: 2022-09-26 | End: 2022-09-27 | Stop reason: HOSPADM

## 2022-09-26 RX ORDER — ACETAMINOPHEN 325 MG/1
975 TABLET ORAL ONCE
Status: COMPLETED | OUTPATIENT
Start: 2022-09-26 | End: 2022-09-26

## 2022-09-26 RX ORDER — SODIUM CHLORIDE, SODIUM LACTATE, POTASSIUM CHLORIDE, CALCIUM CHLORIDE 600; 310; 30; 20 MG/100ML; MG/100ML; MG/100ML; MG/100ML
INJECTION, SOLUTION INTRAVENOUS CONTINUOUS PRN
Status: DISCONTINUED | OUTPATIENT
Start: 2022-09-26 | End: 2022-09-26

## 2022-09-26 RX ORDER — PROPOFOL 10 MG/ML
INJECTION, EMULSION INTRAVENOUS CONTINUOUS PRN
Status: DISCONTINUED | OUTPATIENT
Start: 2022-09-26 | End: 2022-09-26

## 2022-09-26 RX ORDER — FERRIC SUBSULFATE 0.21 G/G
LIQUID TOPICAL PRN
Status: DISCONTINUED | OUTPATIENT
Start: 2022-09-26 | End: 2022-09-26 | Stop reason: HOSPADM

## 2022-09-26 RX ORDER — IODINE AND POTASSIUM IODIDE 50; 100 MG/ML; MG/ML
LIQUID ORAL PRN
Status: DISCONTINUED | OUTPATIENT
Start: 2022-09-26 | End: 2022-09-26 | Stop reason: HOSPADM

## 2022-09-26 RX ORDER — OXYCODONE HYDROCHLORIDE 5 MG/1
5 TABLET ORAL EVERY 4 HOURS PRN
Status: DISCONTINUED | OUTPATIENT
Start: 2022-09-26 | End: 2022-09-27 | Stop reason: HOSPADM

## 2022-09-26 RX ORDER — DEXAMETHASONE SODIUM PHOSPHATE 4 MG/ML
INJECTION, SOLUTION INTRA-ARTICULAR; INTRALESIONAL; INTRAMUSCULAR; INTRAVENOUS; SOFT TISSUE PRN
Status: DISCONTINUED | OUTPATIENT
Start: 2022-09-26 | End: 2022-09-26

## 2022-09-26 RX ORDER — SODIUM CHLORIDE, SODIUM LACTATE, POTASSIUM CHLORIDE, CALCIUM CHLORIDE 600; 310; 30; 20 MG/100ML; MG/100ML; MG/100ML; MG/100ML
INJECTION, SOLUTION INTRAVENOUS CONTINUOUS
Status: DISCONTINUED | OUTPATIENT
Start: 2022-09-26 | End: 2022-09-27 | Stop reason: HOSPADM

## 2022-09-26 RX ORDER — IBUPROFEN 800 MG/1
800 TABLET, FILM COATED ORAL ONCE
Status: DISCONTINUED | OUTPATIENT
Start: 2022-09-26 | End: 2022-09-27 | Stop reason: HOSPADM

## 2022-09-26 RX ORDER — ONDANSETRON 2 MG/ML
4 INJECTION INTRAMUSCULAR; INTRAVENOUS EVERY 30 MIN PRN
Status: DISCONTINUED | OUTPATIENT
Start: 2022-09-26 | End: 2022-09-27 | Stop reason: HOSPADM

## 2022-09-26 RX ORDER — PROPOFOL 10 MG/ML
INJECTION, EMULSION INTRAVENOUS PRN
Status: DISCONTINUED | OUTPATIENT
Start: 2022-09-26 | End: 2022-09-26

## 2022-09-26 RX ORDER — KETOROLAC TROMETHAMINE 30 MG/ML
INJECTION, SOLUTION INTRAMUSCULAR; INTRAVENOUS PRN
Status: DISCONTINUED | OUTPATIENT
Start: 2022-09-26 | End: 2022-09-26

## 2022-09-26 RX ORDER — ACETAMINOPHEN 325 MG/1
975 TABLET ORAL ONCE
Status: DISCONTINUED | OUTPATIENT
Start: 2022-09-26 | End: 2022-09-27 | Stop reason: HOSPADM

## 2022-09-26 RX ORDER — LIDOCAINE 40 MG/G
CREAM TOPICAL
Status: DISCONTINUED | OUTPATIENT
Start: 2022-09-26 | End: 2022-09-27 | Stop reason: HOSPADM

## 2022-09-26 RX ORDER — CEFAZOLIN SODIUM 1 G/3ML
INJECTION, POWDER, FOR SOLUTION INTRAMUSCULAR; INTRAVENOUS PRN
Status: DISCONTINUED | OUTPATIENT
Start: 2022-09-26 | End: 2022-09-26

## 2022-09-26 RX ORDER — LIDOCAINE HYDROCHLORIDE 20 MG/ML
INJECTION, SOLUTION INFILTRATION; PERINEURAL PRN
Status: DISCONTINUED | OUTPATIENT
Start: 2022-09-26 | End: 2022-09-26

## 2022-09-26 RX ORDER — MEPERIDINE HYDROCHLORIDE 25 MG/ML
12.5 INJECTION INTRAMUSCULAR; INTRAVENOUS; SUBCUTANEOUS
Status: DISCONTINUED | OUTPATIENT
Start: 2022-09-26 | End: 2022-09-27 | Stop reason: HOSPADM

## 2022-09-26 RX ORDER — ONDANSETRON 2 MG/ML
INJECTION INTRAMUSCULAR; INTRAVENOUS PRN
Status: DISCONTINUED | OUTPATIENT
Start: 2022-09-26 | End: 2022-09-26

## 2022-09-26 RX ORDER — HYDROMORPHONE HCL IN WATER/PF 6 MG/30 ML
0.4 PATIENT CONTROLLED ANALGESIA SYRINGE INTRAVENOUS EVERY 5 MIN PRN
Status: CANCELLED | OUTPATIENT
Start: 2022-09-26

## 2022-09-26 RX ORDER — FENTANYL CITRATE 0.05 MG/ML
50 INJECTION, SOLUTION INTRAMUSCULAR; INTRAVENOUS EVERY 5 MIN PRN
Status: CANCELLED | OUTPATIENT
Start: 2022-09-26

## 2022-09-26 RX ORDER — ONDANSETRON 4 MG/1
4 TABLET, ORALLY DISINTEGRATING ORAL EVERY 30 MIN PRN
Status: DISCONTINUED | OUTPATIENT
Start: 2022-09-26 | End: 2022-09-27 | Stop reason: HOSPADM

## 2022-09-26 RX ORDER — FENTANYL CITRATE 50 UG/ML
INJECTION, SOLUTION INTRAMUSCULAR; INTRAVENOUS PRN
Status: DISCONTINUED | OUTPATIENT
Start: 2022-09-26 | End: 2022-09-26

## 2022-09-26 RX ADMIN — Medication 50 MCG: at 08:30

## 2022-09-26 RX ADMIN — FENTANYL CITRATE 25 MCG: 50 INJECTION, SOLUTION INTRAMUSCULAR; INTRAVENOUS at 08:06

## 2022-09-26 RX ADMIN — ONDANSETRON 4 MG: 2 INJECTION INTRAMUSCULAR; INTRAVENOUS at 08:01

## 2022-09-26 RX ADMIN — ACETAMINOPHEN 975 MG: 325 TABLET ORAL at 07:04

## 2022-09-26 RX ADMIN — SODIUM CHLORIDE, SODIUM LACTATE, POTASSIUM CHLORIDE, CALCIUM CHLORIDE: 600; 310; 30; 20 INJECTION, SOLUTION INTRAVENOUS at 07:04

## 2022-09-26 RX ADMIN — Medication 100 MCG: at 08:04

## 2022-09-26 RX ADMIN — FENTANYL CITRATE 50 MCG: 50 INJECTION, SOLUTION INTRAMUSCULAR; INTRAVENOUS at 07:54

## 2022-09-26 RX ADMIN — OXYCODONE HYDROCHLORIDE 5 MG: 5 TABLET ORAL at 09:25

## 2022-09-26 RX ADMIN — PROPOFOL 50 MG: 10 INJECTION, EMULSION INTRAVENOUS at 08:01

## 2022-09-26 RX ADMIN — PROPOFOL 40 MG: 10 INJECTION, EMULSION INTRAVENOUS at 08:05

## 2022-09-26 RX ADMIN — SODIUM CHLORIDE, SODIUM LACTATE, POTASSIUM CHLORIDE, CALCIUM CHLORIDE: 600; 310; 30; 20 INJECTION, SOLUTION INTRAVENOUS at 07:54

## 2022-09-26 RX ADMIN — FENTANYL CITRATE 25 MCG: 50 INJECTION, SOLUTION INTRAMUSCULAR; INTRAVENOUS at 08:33

## 2022-09-26 RX ADMIN — DEXAMETHASONE SODIUM PHOSPHATE 10 MG: 4 INJECTION, SOLUTION INTRA-ARTICULAR; INTRALESIONAL; INTRAMUSCULAR; INTRAVENOUS; SOFT TISSUE at 08:01

## 2022-09-26 RX ADMIN — Medication 50 MCG: at 08:14

## 2022-09-26 RX ADMIN — PROPOFOL 160 MCG/KG/MIN: 10 INJECTION, EMULSION INTRAVENOUS at 08:03

## 2022-09-26 RX ADMIN — CEFAZOLIN SODIUM 2 G: 1 INJECTION, POWDER, FOR SOLUTION INTRAMUSCULAR; INTRAVENOUS at 08:04

## 2022-09-26 RX ADMIN — FENTANYL CITRATE 50 MCG: 0.05 INJECTION, SOLUTION INTRAMUSCULAR; INTRAVENOUS at 09:16

## 2022-09-26 RX ADMIN — LIDOCAINE HYDROCHLORIDE 3 ML: 20 INJECTION, SOLUTION INFILTRATION; PERINEURAL at 08:01

## 2022-09-26 RX ADMIN — KETOROLAC TROMETHAMINE 15 MG: 30 INJECTION, SOLUTION INTRAMUSCULAR; INTRAVENOUS at 08:33

## 2022-09-26 RX ADMIN — SODIUM CHLORIDE, SODIUM LACTATE, POTASSIUM CHLORIDE, CALCIUM CHLORIDE: 600; 310; 30; 20 INJECTION, SOLUTION INTRAVENOUS at 09:22

## 2022-09-26 ASSESSMENT — LIFESTYLE VARIABLES: TOBACCO_USE: 1

## 2022-09-26 NOTE — DISCHARGE INSTRUCTIONS
If you have any questions or concerns regarding your procedure, please contact Dr. Forbes, her office number is [phone].    You received an IV medication today called Toradol. You received this medication at 0833. This is a NSAID. Therefore, do not take any NSAIDS (Ibuprofen products, Advil, Motrin, etc) until 2:35 pm.     You have received 975 mg of Acetaminophen (Tylenol) at 7:04 AM. Please do not take an additional dose of Tylenol until after 1:04 PM     Do not exceed 4,000 mg of acetaminophen during a 24 hour period and keep in mind that acetaminophen can also be found in many over-the-counter cold medications as well as narcotics that may be given for pain.     Discharge Instructions for Gynecological Procedures (i.e., Laparoscopy, Hysteroscopy, LEEP or Cold Knife Cone Biopsy)     Activity:  You may resume normal activities as your abdominal discomfort disappears.  You may expect some discomfort under the ribs and shoulder area for the first 24 hours.  In nearly all cases, this will disappear shortly after the first day.  It is certainly permissible to climb stairs, and do ordinary, quiet activities.  More vigorous activities such as sports, and work may be resumed in 48 - 72 hours as seems to befit your situation.  You should not bathe for one week, but you may shower any time.     Office Visit:  Please call a day or two after your surgery to make an appointment in 1 - 2 weeks to discuss the results of your surgery and have your check-up.       When to call your healthcare provider:     Vaginal Discharge:  You may have some bloody vaginal discharge for as long as one week.  Pads may be used any time.  Avoid placing anything in your vagina (tampons, douches, intercourse) for two weeks.  Temperature:  If you develop a temperature elevation of 101.5 degrees or higher, please call immediately or go to the Emergency Room if after office hours.  Restrictions:  Due to the effects of general anesthesia, please do  not drive a car, drink alcoholic beverages, or operate complex machinery in the first 24 hours following surgery.     If you develop any vomiting, or abdominal pain that is worsening over time, or any vaginal bleeding heavier than usual menstrual period (more than one pad change each hour), please contact Dr. Forbes, her office number is [phone].    Discharge Instructions: After Your Surgery, regarding Anesthesia    You ve just had surgery. During surgery, you were given medicine called anesthesia to keep you relaxed and free of pain. After surgery, you may have some pain or nausea. This is common. Here are some tips for feeling better and getting well after surgery.    Going home    Your healthcare provider will show you how to take care of yourself when you go home. He or she will also answer your questions. Have an adult family member or friend drive you home. For the first 24 hours after your surgery:    Don't drive or use heavy equipment.  Don't make important decisions or sign legal papers.  Don't drink alcohol.  Have an adult stay with you for the next 24 hours. He or she can watch for problems and help keep you safe.  Be sure to go to all follow-up visits with your healthcare provider. And rest after your surgery for as long as your healthcare provider tells you to.    Coping with pain    *Pain after surgery is normal and expected*    If you have pain after surgery, pain medicine will help you feel better. Take it as told, before pain becomes severe. Also, ask your healthcare provider or pharmacist about other ways to control pain. This might be with heat, ice, or relaxation. And follow any other instructions your surgeon or nurse gives you.    Tips for taking pain medicine    To get the best relief possible, remember these points:    Pain medicines can upset your stomach. Taking them with a little food may help.  Most pain relievers taken by mouth need at least 20 to 30 minutes to start to work.  Don't  wait till your pain becomes severe before you take your medicine. Try to time your medicine so that you can take it before starting an activity. This might be before you get dressed, go for a walk, or sit down for dinner.  Constipation is a common side effect of pain medicines. You can take medicines such as laxatives (Miralax) or stool softeners to help ease constipation. Drinking lots of fluids and eating foods such as fruits and vegetables that are high in fiber can also help.  Drinking alcohol and taking pain medicine can cause dizziness and slow your breathing. It can even be deadly. Don't drink alcohol while taking pain medicine.  Pain medicine can make you react more slowly to things. Don't drive or run machinery while taking pain medicine.  Your healthcare provider may tell you to take acetaminophen to help ease your pain. Ask him or her how much you are supposed to take each day. Acetaminophen or other pain relievers may interact with your prescription medicines or other over-the-counter (OTC) medicines. Some prescription medicines have acetaminophen and other ingredients. Using both prescription and OTC acetaminophen for pain can cause you to overdose. Read the labels on your OTC medicines with care. This will help you to clearly know the list of ingredients, how much to take, and any warnings. It may also help you not take too much acetaminophen. If you have questions or don't understand the information, ask your pharmacist or healthcare provider to explain it to you before you take the OTC medicine.    Managing nausea    Some people have an upset stomach after surgery. This is often because of anesthesia, pain, or pain medicine, or the stress of surgery. These tips will help you handle nausea and eat healthy foods as you get better. If you were on a special food plan before surgery, ask your healthcare provider if you should follow it while you get better. These tips may help:    Don't push yourself to  eat. Your body will tell you when to eat and how much.  Start off with clear liquids and soup. They are easier to digest.  Next try semi-solid foods, such as mashed potatoes, applesauce, and gelatin, as you feel ready.  Slowly move to solid foods. Don t eat fatty, rich, or spicy foods at first.  Don't force yourself to have 3 large meals a day. Instead eat smaller amounts more often.  Take pain medicines with a small amount of solid food, such as crackers or toast, to prevent nausea.    If you have obstructive sleep apnea    You were given anesthesia medicine during surgery to keep you comfortable and free of pain. After surgery, you may have more apnea spells because of this medicine and other medicines you were given. The spells may last longer than usual.   At home:    Keep using the continuous positive airway pressure (CPAP) device when you sleep. Unless your healthcare provider tells you not to, use it when you sleep, day or night. CPAP is a common device used to treat obstructive sleep apnea.  Talk with your provider before taking any pain medicine, muscle relaxants, or sedatives. Your provider will tell you about the possible dangers of taking these medicines.

## 2022-09-26 NOTE — ANESTHESIA PREPROCEDURE EVALUATION
Anesthesia Pre-Procedure Evaluation    Patient: Larissa Elliott   MRN: [id number] : 1980        Procedure : Procedure(s):  LOOP ELECTROSURGICAL EXCISION PROCEDURE (LEEP)          Past Medical History:   Diagnosis Date     Abnormal Pap smear of cervix     Was told Precancerous cells & HPV     History of anesthesia complications     slow wake up     Migraines      Moderate major depression (H) 2021     PCOS (polycystic ovarian syndrome)       Past Surgical History:   Procedure Laterality Date     BACK SURGERY Left     L5 S1     ENDOMETRIAL ABLATION       GYNECOLOGIC CRYOSURGERY       HC REVISE MEDIAN N/CARPAL TUNNEL SURG Left 2021    Procedure: LEFT CARPAL TUNNEL RELEASE;  Surgeon: Anna Hameed MD;  Location: [facility] Main OR;  Service: Neurosurgery     LAPAROTOMY EXPLORATORY       LUMBAR DISCECTOMY Left 2015    Procedure: LEFT LUMBAR 4-5 MICRODISCECTOMY AND REVISION LEFT LUMBAR 5-SACRAL 1 MICRODISCECTOMY;  Surgeon: Mick Bolton MD;  Location: [facility] OR;  Service:      LUMBAR DISCECTOMY Left 2015    Procedure: LEFT L4-5 MICRODISCECTOMY AND REVISION LEFT L5-S1 MICRODISCECTOMY;  Surgeon: Mick Bolton MD;  Location: [facility] Main OR;  Service:      OTHER SURGICAL HISTORY Bilateral     back surgeryDisc ectomy L5/S1     DC LAMNOTMY INCL W/DCMPRSN NRV ROOT 1 INTRSPC LUMBR Right 3/5/2020    Procedure: MICRODISCECTOMY RIGHT LUMBAR 4-5 LATERAL RECESS DECOMPRESSION RIGHT LUMBAR 4-5;  Surgeon: Mick Bolton MD;  Location: [facility] Main OR;  Service: Spine     DC LAMNOTMY INCL W/DCMPRSN NRV ROOT 1 INTRSPC LUMBR Right 6/3/2021    Procedure: RIGHT LUMBAR 4-5 REVISION MICRODISCECTOMY;  Surgeon: Mick Bolton MD;  Location: [facility] Main OR;  Service: Spine     SPINE SURGERY        Allergies   Allergen Reactions     Adhesive Tape Rash     Latex Rash      Social History     Tobacco Use     Smoking status: Former Smoker      Smokeless tobacco: Never Used   Substance Use Topics     Alcohol use: Yes     Alcohol/week: 1.0 standard drink     Comment: Special occasions only      Wt Readings from Last 1 Encounters:   09/19/22 84 kg (185 lb 2 oz)        Anesthesia Evaluation   Pt has had prior anesthetic.     No history of anesthetic complications       ROS/MED HX  ENT/Pulmonary:     (+) tobacco use, Past use,     Neurologic:     (+) migraines,     Cardiovascular:  - neg cardiovascular ROS     METS/Exercise Tolerance: >4 METS    Hematologic:  - neg hematologic  ROS     Musculoskeletal:  - neg musculoskeletal ROS     GI/Hepatic:  - neg GI/hepatic ROS     Renal/Genitourinary:  - neg Renal ROS     Endo: Comment: Polycystic ovarian syndrome    (+) Obesity (BMI 30),     Psychiatric/Substance Use:     (+) psychiatric history depression     Infectious Disease:       Malignancy:       Other: Comment: HARITHA  H/o endometriosis           Physical Exam    Airway        Mallampati: II   TM distance: > 3 FB   Neck ROM: full   Mouth opening: > 3 cm    Respiratory Devices and Support         Dental  no notable dental history         Cardiovascular   cardiovascular exam normal          Pulmonary   pulmonary exam normal                OUTSIDE LABS:  CBC:   Lab Results   Component Value Date    WBC 4.8 09/19/2022    WBC 4.4 11/02/2021    HGB 12.7 09/19/2022    HGB 13.4 11/02/2021    HCT 37.2 09/19/2022    HCT 39.5 11/02/2021     09/19/2022     11/02/2021     BMP:   Lab Results   Component Value Date     05/27/2021     04/20/2021    POTASSIUM 4.4 05/27/2021    POTASSIUM 4.6 04/20/2021    CHLORIDE 104 05/27/2021    CHLORIDE 107 04/20/2021    CO2 26 05/27/2021    CO2 24 04/20/2021    BUN 14 05/27/2021    BUN 11 04/20/2021    CR 0.88 05/27/2021    CR 0.86 04/20/2021    GLC 81 05/27/2021    GLC 83 04/20/2021     COAGS:   Lab Results   Component Value Date    PTT 28 02/19/2021    INR 1.04 09/19/2022     POC:   Lab Results   Component Value Date     HCG Negative 06/03/2021     HEPATIC:   Lab Results   Component Value Date    ALBUMIN 4.1 12/05/2019    PROTTOTAL 7.2 12/05/2019    PROTTOTAL 6.7 12/05/2019    ALT 20 12/05/2019    AST 21 12/05/2019    ALKPHOS 39 (L) 12/05/2019    BILITOTAL 0.5 12/05/2019     OTHER:   Lab Results   Component Value Date    A1C 5.1 07/14/2022    SULAIMAN 9.6 05/27/2021    MAG 1.9 12/05/2019    TSH 1.07 11/02/2021    SED 4 12/05/2019       Anesthesia Plan    ASA Status:  2   NPO Status:  NPO Appropriate    Anesthesia Type: MAC.              Consents    Anesthesia Plan(s) and associated risks, benefits, and realistic alternatives discussed. Questions answered and patient/representative(s) expressed understanding.     - Discussed: Risks, Benefits and Alternatives for BOTH SEDATION and the PROCEDURE were discussed     - Discussed with:  Patient      - Extended Intubation/Ventilatory Support Discussed: No.      - Patient is DNR/DNI Status: No         Postoperative Care    Pain management: Multi-modal analgesia.   PONV prophylaxis: Ondansetron (or other 5HT-3), Dexamethasone or Solumedrol     Comments:                Josué Romero MD

## 2022-09-26 NOTE — OP NOTE
Procedure Date: 09/26/2022    PREOPERATIVE DIAGNOSIS:  Cervical intraepithelial neoplasia 3, Mirena intrauterine device.    POSTOPERATIVE DIAGNOSIS:  Cervical intraepithelial neoplasia 3, Mirena device.    PROCEDURE:  Removal and insertion of Mirena IUD, LEEP cone biopsy.    HISTORY:  The patient is a 42-year-old para 3-0-0-3, who has a history of HARITHA 3.  She has a Mirena IUD and because of the size of her cervix, it was recommended that it would be doubtful to not cut the string.  Therefore, removal and reinsertion of Mirena IUD and LEEP cone biopsy were reviewed.  Risks, benefits, alternatives reviewed, questions answered, permit signed.    DESCRIPTION OF PROCEDURE:  The patient was taken to the operating room under MAC, placed in the dorsal lithotomy position, prepped and draped in the usual manner.  A timeout was undertaken.  A bivalve speculum was placed.  Colposcopy was performed 3% acetic acid was applied to the cervix.  Squamocolumnar junction was noted.  Approximately 6 mL of 1% lidocaine with epinephrine was instilled.  The large LEEP was then used, taking the bed down to approximately 5-6 mm opened at 12 o'clock.  Shavings were done circumferentially around the LEEP to obtain any abnormal tissue.  This was sent as a path specimen with superficial LEEP and shavings.  A deeper LEEP was then undertaken, taking the bed down to 7-8 mm.  An ECC was then performed and cautery was used with excellent hemostasis.  A tenaculum was then placed.  The uterus sounded to 8 and the Mirena was easily inserted and strings cut appropriately.  There was minimal cautery, left avoiding the string inferior at 6 o'clock with good hemostasis.  Monsel's was applied.  Tenaculum removed.  No bleeding noted.  Lincoln removed and patient transferred to [facility] in stable condition.    SURGEON:  Uyen Forbes.    COMPLICATIONS:  None.    ESTIMATED BLOOD LOSS:  3 mL    Uyen Forbes MD        D: 09/26/2022   T: 09/26/2022   MT: sd    Name:      LETITIA REYES  MRN:      [id number]        Account:        [id number]   :      1980           Procedure Date: 2022     Document: [id number]    cc:  [facility]

## 2022-09-26 NOTE — ANESTHESIA POSTPROCEDURE EVALUATION
Patient: Larissa Elliott    Procedure: Procedure(s):  LOOP ELECTROSURGICAL EXCISION PROCEDURE (LEEP), REMOVAL AND INSERTION OF MIRENA INTRAUTERINE DEVICE       Anesthesia Type:  MAC    Note:  Disposition: Outpatient   Postop Pain Control: Uneventful            Sign Out: Well controlled pain   PONV: No   Neuro/Psych: Uneventful            Sign Out: Acceptable/Baseline neuro status   Airway/Respiratory: Uneventful            Sign Out: Acceptable/Baseline resp. status   CV/Hemodynamics: Uneventful            Sign Out: Acceptable CV status; No obvious hypovolemia; No obvious fluid overload   Other NRE: NONE   DID A NON-ROUTINE EVENT OCCUR? No           Last vitals:  Vitals Value Taken Time   /54 09/26/22 0930   Temp 97.1  F (36.2  C) 09/26/22 0838   Pulse 77 09/26/22 0935   Resp 16 09/26/22 0838   SpO2 99 % 09/26/22 0935   Vitals shown include unvalidated device data.    Electronically Signed By: Josué Romero MD  September 26, 2022  10:51 AM

## 2022-09-26 NOTE — ANESTHESIA CARE TRANSFER NOTE
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    Anesthesia Type:   MAC     Note:      Level of Consciousness: drowsy  Oxygen Supplementation: room air    Independent Airway: airway patency satisfactory and stable  Dentition: dentition unchanged  Vital Signs Stable: post-procedure vital signs reviewed and stable  Report to RN Given: handoff report given  Patient transferred to: Phase II    Handoff Report: Identifed the Patient, Identified the Reponsible Provider, Reviewed the pertinent medical history, Discussed the surgical course, Reviewed Intra-OP anesthesia mangement and issues during anesthesia, Set expectations for post-procedure period and Allowed opportunity for questions and acknowledgement of understanding      Vitals:  Vitals Value Taken Time   BP 81/45 09/26/22 0838   Temp 97.1  F (36.2  C) 09/26/22 0838   Pulse 71 09/26/22 0838   Resp 16 09/26/22 0838   SpO2 95 % 09/26/22 0838       Electronically Signed By: MILAGROS Iverson CRNA  September 26, 2022  8:41 AM

## 2022-10-13 ENCOUNTER — PATIENT OUTREACH (OUTPATIENT)
Dept: FAMILY MEDICINE | Facility: CLINIC | Age: 42
End: 2022-10-13

## 2022-10-13 NOTE — LETTER
March 9, 2023      Larissa Elliott  [address] [address]        Dear ,    At [facility], your health and wellness are our primary concern. That is why we are following up on your most recent LEEP.    Please call [phone] to schedule an appointment for your recommended follow-up Pap smear and Human Papillomavirus (HPV) test at your earliest convenience.      If you have completed the appointment outside of the [facility] system, please have the records forwarded to our office. We will update your chart for your provider to review before your next annual wellness visit.     Thank you for choosing [facility]!      Sincerely,    Your [facility] Care Team

## 2022-11-15 ENCOUNTER — HOSPITAL ENCOUNTER (OUTPATIENT)
Dept: MAMMOGRAPHY | Facility: CLINIC | Age: 42
Discharge: HOME | End: 2022-11-15
Attending: FAMILY MEDICINE | Admitting: FAMILY MEDICINE
Payer: COMMERCIAL

## 2022-11-15 DIAGNOSIS — Z12.31 VISIT FOR SCREENING MAMMOGRAM: ICD-10-CM

## 2022-11-15 PROCEDURE — 77067 SCR MAMMO BI INCL CAD: CPT

## 2022-12-14 ENCOUNTER — LAB REQUISITION (OUTPATIENT)
Dept: LAB | Facility: CLINIC | Age: 42
End: 2022-12-14

## 2022-12-14 DIAGNOSIS — Z13.220 ENCOUNTER FOR SCREENING FOR LIPOID DISORDERS: ICD-10-CM

## 2022-12-14 DIAGNOSIS — Z13.29 ENCOUNTER FOR SCREENING FOR OTHER SUSPECTED ENDOCRINE DISORDER: ICD-10-CM

## 2022-12-14 DIAGNOSIS — Z13.1 ENCOUNTER FOR SCREENING FOR DIABETES MELLITUS: ICD-10-CM

## 2022-12-14 DIAGNOSIS — E28.2 POLYCYSTIC OVARIAN SYNDROME: ICD-10-CM

## 2022-12-14 DIAGNOSIS — Z13.0 ENCOUNTER FOR SCREENING FOR DISEASES OF THE BLOOD AND BLOOD-FORMING ORGANS AND CERTAIN DISORDERS INVOLVING THE IMMUNE MECHANISM: ICD-10-CM

## 2022-12-14 PROCEDURE — 83525 ASSAY OF INSULIN: CPT | Performed by: OBSTETRICS & GYNECOLOGY

## 2022-12-14 PROCEDURE — 80061 LIPID PANEL: CPT | Performed by: OBSTETRICS & GYNECOLOGY

## 2022-12-14 PROCEDURE — 85018 HEMOGLOBIN: CPT | Performed by: OBSTETRICS & GYNECOLOGY

## 2022-12-14 PROCEDURE — 83036 HEMOGLOBIN GLYCOSYLATED A1C: CPT | Performed by: OBSTETRICS & GYNECOLOGY

## 2022-12-14 PROCEDURE — 84443 ASSAY THYROID STIM HORMONE: CPT | Performed by: OBSTETRICS & GYNECOLOGY

## 2022-12-14 PROCEDURE — 82947 ASSAY GLUCOSE BLOOD QUANT: CPT | Performed by: OBSTETRICS & GYNECOLOGY

## 2022-12-14 PROCEDURE — 83498 ASY HYDROXYPROGESTERONE 17-D: CPT | Performed by: OBSTETRICS & GYNECOLOGY

## 2022-12-15 LAB
CHOLEST SERPL-MCNC: 180 MG/DL
FASTING STATUS PATIENT QL REPORTED: YES
GLUCOSE SERPL-MCNC: 83 MG/DL (ref 70–99)
HBA1C MFR BLD: 5.3 %
HDLC SERPL-MCNC: 53 MG/DL
HGB BLD-MCNC: 13.8 G/DL (ref 11.7–15.7)
HOLD SPECIMEN: NORMAL
INSULIN SERPL-ACNC: 7.1 UU/ML (ref 2.6–24.9)
LDLC SERPL CALC-MCNC: 110 MG/DL
NONHDLC SERPL-MCNC: 127 MG/DL
TRIGL SERPL-MCNC: 83 MG/DL
TSH SERPL DL<=0.005 MIU/L-ACNC: 0.55 UIU/ML (ref 0.3–4.2)

## 2022-12-21 DIAGNOSIS — G43.809 OTHER MIGRAINE WITHOUT STATUS MIGRAINOSUS, NOT INTRACTABLE: ICD-10-CM

## 2022-12-21 LAB — 17OHP SERPL-MCNC: 64 NG/DL

## 2022-12-22 RX ORDER — TOPIRAMATE 25 MG/1
TABLET, FILM COATED ORAL
Qty: 30 TABLET | Refills: 0 | Status: SHIPPED | OUTPATIENT
Start: 2022-12-22 | End: 2023-01-26

## 2022-12-22 NOTE — TELEPHONE ENCOUNTER
Refill request for: topiramate (TOPAMAX) 25 MG tablet  Directions: Take 1 tablet (25 mg) by mouth daily     LOV: 9/1/21  NOV: 1/26/23    30 day supply with 0 refills Medication T'd for review and signature to Dr. Kaur as Dr. Lopes is out  Felicitas Babb CMA on 12/22/2022 at 1:19 PM

## 2023-01-04 DIAGNOSIS — L70.0 ACNE VULGARIS: ICD-10-CM

## 2023-01-04 RX ORDER — SPIRONOLACTONE 50 MG/1
50 TABLET, FILM COATED ORAL DAILY
Qty: 90 TABLET | Refills: 0 | Status: SHIPPED | OUTPATIENT
Start: 2023-01-04 | End: 2023-04-12

## 2023-01-04 NOTE — TELEPHONE ENCOUNTER
"Routing refill request to provider for review/approval because:  Labs not current:  Multiple    Last Written Prescription Date:  4/4/22  Last Fill Quantity: 90,  # refills: 2   Last office visit provider:  9/19/22     Requested Prescriptions   Pending Prescriptions Disp Refills     spironolactone (ALDACTONE) 50 MG tablet [Pharmacy Med Name: SPIRONOLACTONE 50 MG TABLET] 30 tablet 8     Sig: TAKE 1 TABLET BY MOUTH EVERY DAY       Diuretics (Including Combos) Protocol Failed - 1/4/2023  2:02 PM        Failed - Normal serum creatinine on file in past 12 months     Recent Labs   Lab Test 05/27/21  1458   CR 0.88              Failed - Normal serum potassium on file in past 12 months     Recent Labs   Lab Test 05/27/21  1458   POTASSIUM 4.4                    Failed - Normal serum sodium on file in past 12 months     Recent Labs   Lab Test 05/27/21  1458                 Passed - Blood pressure under 140/90 in past 12 months     BP Readings from Last 3 Encounters:   09/26/22 117/54   09/19/22 101/51   08/17/22 124/76                 Passed - Recent (12 mo) or future (30 days) visit within the authorizing provider's specialty     Patient has had an office visit with the authorizing provider or a provider within the authorizing providers department within the previous 12 mos or has a future within next 30 days. See \"Patient Info\" tab in inbasket, or \"Choose Columns\" in Meds & Orders section of the refill encounter.              Passed - Medication is active on med list        Passed - Patient is age 18 or older        Passed - No active pregancy on record        Passed - No positive pregnancy test in past 12 months             Jose Enrique Valencia RN 01/04/23 2:02 PM  "

## 2023-01-26 ENCOUNTER — OFFICE VISIT (OUTPATIENT)
Dept: NEUROLOGY | Facility: CLINIC | Age: 43
End: 2023-01-26
Payer: COMMERCIAL

## 2023-01-26 VITALS
WEIGHT: 177 LBS | HEART RATE: 74 BPM | BODY MASS INDEX: 29.01 KG/M2 | RESPIRATION RATE: 16 BRPM | SYSTOLIC BLOOD PRESSURE: 98 MMHG | DIASTOLIC BLOOD PRESSURE: 65 MMHG

## 2023-01-26 DIAGNOSIS — R74.8 ELEVATED CK: Primary | ICD-10-CM

## 2023-01-26 DIAGNOSIS — G43.809 OTHER MIGRAINE WITHOUT STATUS MIGRAINOSUS, NOT INTRACTABLE: ICD-10-CM

## 2023-01-26 DIAGNOSIS — M48.061 LUMBAR FORAMINAL STENOSIS: ICD-10-CM

## 2023-01-26 DIAGNOSIS — R25.3 MUSCLE TWITCHING: ICD-10-CM

## 2023-01-26 PROCEDURE — 99214 OFFICE O/P EST MOD 30 MIN: CPT | Performed by: PSYCHIATRY & NEUROLOGY

## 2023-01-26 RX ORDER — TOPIRAMATE 25 MG/1
TABLET, FILM COATED ORAL
Qty: 270 TABLET | Refills: 3 | Status: SHIPPED | OUTPATIENT
Start: 2023-01-26 | End: 2023-09-07

## 2023-01-26 RX ORDER — SUMATRIPTAN 50 MG/1
50 TABLET, FILM COATED ORAL
Qty: 18 TABLET | Refills: 11 | Status: SHIPPED | OUTPATIENT
Start: 2023-01-26

## 2023-01-26 NOTE — LETTER
1/26/2023         RE: Larissa Elliott  [address] [address]        Dear Colleague,    Thank you for referring your patient, Larissa Elliott, to the [facility] [place]. Please see a copy of my visit note below.    NEUROLOGY OUTPATIENT PROGRESS NOTE  Jan 26, 2023     CHIEF COMPLAINT/REASON FOR VISIT/REASON FOR CONSULT  Patient presents with:  Follow Up    REASON FOR CONSULTATION- Headaches      HISTORY OF PRESENT ILLNESS  Larissa Elliott is a 42 year old female seen for evaluation of headaches. Headache started when she was younger in college. Headaches were once every week. Headaches are more on the left side throbbing. There was photophobia and photophobia. She does feel nauseous with the headaches. Maxalt caused side effects as result she was switched to Imitrex. She has found walnuts to be a trigger for headaches. Birth control in the placebo week has also been thought to be causing her headaches. She was started on Topamax 25 mg and the headaches have decreased in frequency. Since last visit headaches have been under good control except in the last week where she had continuous multiple headaches. She is not sure what made the headaches worse possibly with changes in season. Imitrex does work as abortive therapy. She is using Excedrin Migraine for milder headaches. There is still around 2 a month except for the rare flareup like last week.    She previously complained of pain in the left leg. She does have a history of L5-S1 microdiscectomy. Pain is more of a cramping/twitching sensation in the thigh and the foot of the left side. MRI lumbar spine did not show any significant lesions that would explain this. It did show previous surgical changes. EMG was negative as well. Blood work was checked for muscle disease and her CK was elevated but no clear cause for it was identified. Repeat CK was also elevated. More recent CK checked by her primary care doctor came back  normal.    Since last time patient had worsening pain on the right side. She was identified to have a disc herniation and needed urgent surgery. She is about 6 weeks postop. Her strength is coming back and her balance is improving. Denies any other neurological issues. Patient denies any triggers that might have caused the disc to herniate.    11/28/20  Patient returns today.  Headaches have slightly worsened.  She is having 5-6 headache days a month.  Headaches are unchanged in nature.  Generally the headaches would be once a week.  She does have vision issues with the headache.  Imitrex does work as abortive therapy.  Patient will have shows if she is too late in taking the medication.  She also been put on antianxiety medication Klonopin recently.  She is going through her divorce which is causing her distress and making headaches worse.  Reports no other triggers.  Headaches unchanged in nature.  Remains on Topamax has issues with taste but no other side effects.    Patient continues to have pain in the right leg.  This more in the back and the hip.  She is planning on going and seeing her orthopedic surgeon again.    Patient denies any muscle pain or muscle twitching.  This was stopped.  CK was still elevated on repeat check.    2/23/21  Patient returns today.  1.  In terms of the headaches these are happening about 3-4 times a month.  Reports no changes in the nature of the headache.  States on Topamax.  Does complain of some taste issues especially when drinking soda.  Denies any other new neurological issues.  Imitrex does work as abortive therapy.  2.  Patient reports no ongoing muscle twitching.  Denies any other weakness or other symptoms.  Previously CK was elevated.  3.  Patient reports that she has been diagnosed with carpal tunnel in both upper extremities.  Has surgery planned for left upper extremity.  She reports no repetitive activity and thinks it is related to pregnancy that happened several  years ago.  4.  In terms of her right leg pain she was seen by the surgeon.  She was found to have another rerupture of her lumbar disc.  She is supposed to get repeat surgery done but wants to try doing more conservative measures during the wintertime and will consider surgery during the summertime    9/1/21  Patient returns today  1.  She remains on Topamax for headaches.  Denies any side effects.  Headaches have 2-3 times a month.  Sumatriptan does work as abortive therapy.  Previously she was having some taste changes with the Topamax and these are still persistent but not bothersome.  2.  She has had shingles had Covid since she was last seen.  3.  She continued to have right leg pain needing repeat surgery  4.  Previously there was concerns for carpal tunnel in both hands.  She has had surgery on the left side continues to have numbness on the right side.  5.  Muscle twitching/fasciculation is manageable and not bothersome.  6.  Stress is about the same.  She has been working from home.    1/26/23  Patient returns today   1.  Topamax has been helping with the headaches.  She reports 1 headache per month in the summertime.  She can get 2-3 headaches per week during the wintertime.  This is from January to end of April.  There are certain foods that can also cause her headaches which she is avoiding.  Changes in weather can also cause her headaches.  Denies any side effects with the Topamax.  Imitrex does work as abortive therapy.  2.  Stress in life is slightly decreased compared to before.  She is getting more used to it.  Is currently working at [employer]  3.  Muscle twitching/fasciculation is more manageable with physical therapy.  4.  Continues to have some numbness in the right hand.  No plans to do surgery on the right side.  5.  Continues to have some numbness in the left leg.  No major back pain.    Previous history is reviewed and this is unchanged.    PAST MEDICAL/SURGICAL HISTORY  Past Medical History:    Diagnosis Date     Abnormal Pap smear of cervix     Was told Precancerous cells & HPV     History of anesthesia complications     slow wake up     Migraines      Moderate major depression (H) 02/19/2021     PCOS (polycystic ovarian syndrome)      Patient Active Problem List   Diagnosis     Chronic migraine     Foraminal stenosis of lumbar region     PCOS (polycystic ovarian syndrome)     Symptomatic varicose veins, bilateral     Mild major depression (H)     Carpal tunnel syndrome of left wrist     IUD (intrauterine device) in place     Papanicolaou smear of cervix with high grade squamous intraepithelial lesion (HGSIL)     Endometriosis   Significant for migraine headaches. L5-S1 microdiscectomy surgery      FAMILY HISTORY  Family History   Problem Relation Age of Onset     Migraines Father      Migraines Brother      Seizure Disorder Brother      Asthma Father      Sarcoidosis Father      Asthma Brother      Breast Cancer Maternal Grandmother         Premenopausal     Chronic Obstructive Pulmonary Disease Paternal Grandfather      Psoriasis Mother        SOCIAL HISTORY  Social History     Tobacco Use     Smoking status: Former     Smokeless tobacco: Never   Substance Use Topics     Alcohol use: Yes     Alcohol/week: 1.0 standard drink     Comment: Special occasions only     Drug use: No       SYSTEMS REVIEW  Twelve-system ROS was done and other than the HPI this was negative.   No new concerns.    MEDICATIONS  acetaminophen (TYLENOL) 500 MG tablet, Take 500 mg by mouth  cholecalciferol 25 MCG (1000 UT) TABS, Take 2,000 Units by mouth  levonorgestrel (MIRENA) 20 MCG/DAY IUD, 1 each by Intrauterine route once Placed in 2020  Multiple Vitamins-Minerals (MULTIVITAMIN ADULT PO),   spironolactone (ALDACTONE) 50 MG tablet, Take 1 tablet (50 mg) by mouth daily  gabapentin (NEURONTIN) 100 MG capsule, Take 1 capsule (100 mg) by mouth 3 times daily (Patient taking differently: Take 100 mg by mouth daily)    No current  facility-administered medications on file prior to visit.       PHYSICAL EXAMINATION  VITALS: BP 98/65   Pulse 74   Resp 16   Wt 80.3 kg (177 lb)   BMI 29.01 kg/m    GENERAL: Healthy appearing, alert, no acute distress, normal habitus.  CARDIOVASCULAR: Extremities warm and well-perfused.  NEUROLOGICAL:  Patient is awake and oriented to self, place and time.  Attention span is normal.  Memory is grossly intact.  Language is fluent and follows commands appropriately.  Appropriate fund of knowledge. Cranial nerves 2-12 are intact. There is no pronator drift.  Motor exam shows 5/5 strength in all extremities.  Tone is symmetric bilaterally in upper and lower extremities.  (Previously reflexes are symmetric and 2+ in upper extremities and lower extremities.)  Finger to nose is without dysmetria.    Gait is normal and the patient is able to do tandem walk .   Exam similar to before.      DIAGNOSTICS  MRI L spine  1.  Interval postoperative changes left discectomy at L4-L5 with   resolution of previously present left paracentral disc protrusion. Mild   right lateral recess narrowing, similar to prior. Overall mild canal   narrowing. Mild to moderate bilateral foraminal   narrowing, similar to prior.  2.  Interval resolution of left paracentral disc protrusion/extrusion at   L5-S1. No significant canal or foraminal narrowing.  3.  Tiny central disc protrusion with annular tear at L3-L4. Mild   bilateral lateral recess narrowing. Overall mild canal narrowing. No   significant change.  4.  Normal distal cord.      EMG  CLINICAL INTERPRETATION:  This is a normal nerve conduction and EMG study except absent left superficial peroneal sensory SNAP which could be artifactual or represent early sensory neuropathy. Further clinical correlation is needed.     MRI brain  MPRESSION:   1.  Normal head MRI.   2.  No acute/subacute infarction, intracranial hemorrhage, mass, mass   effect, or hydrocephalus.     RELEVANT LABS  CK  112.  Component      Latest Ref Rng & Units 12/5/2019 1/22/2020 2/26/2020 11/24/2020   CK, Total      30 - 190 U/L 256 (H) 398 (H) 121 454 (H)     Component Latest Ref Rng & Units 1/9/2019 7/19/2019   Sodium 136 - 145 mmol/L 140   Potassium 3.5 - 5.0 mmol/L 4.6   Chloride 98 - 107 mmol/L 107   CO2 22 - 31 mmol/L 21 (L)   Anion Gap, Calculation 5 - 18 mmol/L 12   Glucose 70 - 125 mg/dL 79   BUN 8 - 22 mg/dL 9   Creatinine 0.60 - 1.10 mg/dL 0.84   GFR MDRD Af Amer >60 mL/min/1.73m2 >60   GFR MDRD Non Af Amer >60 mL/min/1.73m2 >60   Bilirubin, Total 0.0 - 1.0 mg/dL 0.6   Calcium 8.5 - 10.5 mg/dL 9.4   Protein, Total 6.0 - 8.0 g/dL 6.7   ALBUMIN 3.5 - 5.0 g/dL 4.0   Alkaline Phosphatase 45 - 120 U/L 40 (L)   AST 0 - 40 U/L 44 (H)   ALT 0 - 45 U/L 39   Cholesterol <=199 mg/dL 205 (H)   Triglycerides <=149 mg/dL 101   HDL Cholesterol >=50 mg/dL 52   LDL Calculated <=129 mg/dL 133 (H)   Patient Fasting > 8hrs? Yes   Hemoglobin A1c 3.5 - 6.0 % 5.4   TSH 0.30 - 5.00 uIU/mL 1.49     IMPRESSION/REPORT/PLAN  Other migraine without status migrainosus, not intractable  Elevated CK  Muscle twitching  Stress  Lumbar foraminal stenosis    This is a 42 year old female episodic migraines.  Headaches are appropriately controlled with Topamax 25 mg every night during the summertime.  In the wintertime headaches have worsened we will try increasing the dose of Topamax in the wintertime.  Monitor for side effects.  Continue Imitrex for abortive therapy.    In terms of her muscle fasciculations/twitching EMG has not shown possible etiology.  Her blood work for CK was elevated but this was of unclear etiology.  Possibly she does have chronic elevated CK.  Did discuss biopsy in the past but will hold off as it is unlikely to show muscle disease and her symptoms are stable.  Stable on exam.  Will monitor.  Physical therapy is helping.    Continue to monitor bilateral carpal tunnel.  She has had surgery for the left side.  No significant  numbness on the right side.    Would defer management of the lumbar neuroforominal stenosis to her surgeon.    Return in 1 year.    -     SUMAtriptan (IMITREX) 50 MG tablet; Take 1 tablet (50 mg) by mouth at onset of headache for migraine May repeat in 2 hours. Max 2 tablets/24 hours.  -     topiramate (TOPAMAX) 25 MG tablet; 25 mg every night during May-Dec. Jan-April 3 pills/night as needed and tolerated.    Return in about 1 year (around 1/26/2024) for In-Clinic Visit (must).     Over 31 minutes were spent coordinating the care for the patient on the day of the encounter.  This includes previsit, during visit and post visit activities as documented above.  Counseling patient.  Worsening problem.  Prescription management.  Multiple problems addressed.  (Activities include but not inclusive of reviewing chart, reviewing outside records, reviewing labs and imaging study results as well as the images, patient visit time including getting history and exam,  use if applicable, review of test results with the patient and coming up with a plan in a shared model, counseling patient and family, education and answering patient questions, EMR , EMR diagnosis entry and problem list management, medication reconciliation and prescription management if applicable, paperwork if applicable, printing documents and documentation of the visit activities.)      Rj Lopes MD  Neurologist  [facility]  Tel:- [phone]    This note was dictated using voice recognition software.  Any grammatical or context distortions are unintentional and inherent to the software.        Again, thank you for allowing me to participate in the care of your patient.        Sincerely,        Rj Lopes MD

## 2023-01-26 NOTE — PROGRESS NOTES
NEUROLOGY OUTPATIENT PROGRESS NOTE  Jan 26, 2023     CHIEF COMPLAINT/REASON FOR VISIT/REASON FOR CONSULT  Patient presents with:  Follow Up    REASON FOR CONSULTATION- Headaches      HISTORY OF PRESENT ILLNESS  Larissa Elliott is a 42 year old female seen for evaluation of headaches. Headache started when she was younger in college. Headaches were once every week. Headaches are more on the left side throbbing. There was photophobia and photophobia. She does feel nauseous with the headaches. Maxalt caused side effects as result she was switched to Imitrex. She has found walnuts to be a trigger for headaches. Birth control in the placebo week has also been thought to be causing her headaches. She was started on Topamax 25 mg and the headaches have decreased in frequency. Since last visit headaches have been under good control except in the last week where she had continuous multiple headaches. She is not sure what made the headaches worse possibly with changes in season. Imitrex does work as abortive therapy. She is using Excedrin Migraine for milder headaches. There is still around 2 a month except for the rare flareup like last week.    She previously complained of pain in the left leg. She does have a history of L5-S1 microdiscectomy. Pain is more of a cramping/twitching sensation in the thigh and the foot of the left side. MRI lumbar spine did not show any significant lesions that would explain this. It did show previous surgical changes. EMG was negative as well. Blood work was checked for muscle disease and her CK was elevated but no clear cause for it was identified. Repeat CK was also elevated. More recent CK checked by her primary care doctor came back normal.    Since last time patient had worsening pain on the right side. She was identified to have a disc herniation and needed urgent surgery. She is about 6 weeks postop. Her strength is coming back and her balance is improving. Denies any other neurological  issues. Patient denies any triggers that might have caused the disc to herniate.    11/28/20  Patient returns today.  Headaches have slightly worsened.  She is having 5-6 headache days a month.  Headaches are unchanged in nature.  Generally the headaches would be once a week.  She does have vision issues with the headache.  Imitrex does work as abortive therapy.  Patient will have shows if she is too late in taking the medication.  She also been put on antianxiety medication Klonopin recently.  She is going through her divorce which is causing her distress and making headaches worse.  Reports no other triggers.  Headaches unchanged in nature.  Remains on Topamax has issues with taste but no other side effects.    Patient continues to have pain in the right leg.  This more in the back and the hip.  She is planning on going and seeing her orthopedic surgeon again.    Patient denies any muscle pain or muscle twitching.  This was stopped.  CK was still elevated on repeat check.    2/23/21  Patient returns today.  1.  In terms of the headaches these are happening about 3-4 times a month.  Reports no changes in the nature of the headache.  States on Topamax.  Does complain of some taste issues especially when drinking soda.  Denies any other new neurological issues.  Imitrex does work as abortive therapy.  2.  Patient reports no ongoing muscle twitching.  Denies any other weakness or other symptoms.  Previously CK was elevated.  3.  Patient reports that she has been diagnosed with carpal tunnel in both upper extremities.  Has surgery planned for left upper extremity.  She reports no repetitive activity and thinks it is related to pregnancy that happened several years ago.  4.  In terms of her right leg pain she was seen by the surgeon.  She was found to have another rerupture of her lumbar disc.  She is supposed to get repeat surgery done but wants to try doing more conservative measures during the wintertime and will  consider surgery during the summertime    9/1/21  Patient returns today  1.  She remains on Topamax for headaches.  Denies any side effects.  Headaches have 2-3 times a month.  Sumatriptan does work as abortive therapy.  Previously she was having some taste changes with the Topamax and these are still persistent but not bothersome.  2.  She has had shingles had Covid since she was last seen.  3.  She continued to have right leg pain needing repeat surgery  4.  Previously there was concerns for carpal tunnel in both hands.  She has had surgery on the left side continues to have numbness on the right side.  5.  Muscle twitching/fasciculation is manageable and not bothersome.  6.  Stress is about the same.  She has been working from home.    1/26/23  Patient returns today   1.  Topamax has been helping with the headaches.  She reports 1 headache per month in the summertime.  She can get 2-3 headaches per week during the wintertime.  This is from January to end of April.  There are certain foods that can also cause her headaches which she is avoiding.  Changes in weather can also cause her headaches.  Denies any side effects with the Topamax.  Imitrex does work as abortive therapy.  2.  Stress in life is slightly decreased compared to before.  She is getting more used to it.  Is currently working at [employer]  3.  Muscle twitching/fasciculation is more manageable with physical therapy.  4.  Continues to have some numbness in the right hand.  No plans to do surgery on the right side.  5.  Continues to have some numbness in the left leg.  No major back pain.    Previous history is reviewed and this is unchanged.    PAST MEDICAL/SURGICAL HISTORY  Past Medical History:   Diagnosis Date     Abnormal Pap smear of cervix     Was told Precancerous cells & HPV     History of anesthesia complications     slow wake up     Migraines      Moderate major depression (H) 02/19/2021     PCOS (polycystic ovarian syndrome)      Patient  Active Problem List   Diagnosis     Chronic migraine     Foraminal stenosis of lumbar region     PCOS (polycystic ovarian syndrome)     Symptomatic varicose veins, bilateral     Mild major depression (H)     Carpal tunnel syndrome of left wrist     IUD (intrauterine device) in place     Papanicolaou smear of cervix with high grade squamous intraepithelial lesion (HGSIL)     Endometriosis   Significant for migraine headaches. L5-S1 microdiscectomy surgery      FAMILY HISTORY  Family History   Problem Relation Age of Onset     Migraines Father      Migraines Brother      Seizure Disorder Brother      Asthma Father      Sarcoidosis Father      Asthma Brother      Breast Cancer Maternal Grandmother         Premenopausal     Chronic Obstructive Pulmonary Disease Paternal Grandfather      Psoriasis Mother        SOCIAL HISTORY  Social History     Tobacco Use     Smoking status: Former     Smokeless tobacco: Never   Substance Use Topics     Alcohol use: Yes     Alcohol/week: 1.0 standard drink     Comment: Special occasions only     Drug use: No       SYSTEMS REVIEW  Twelve-system ROS was done and other than the HPI this was negative.   No new concerns.    MEDICATIONS  acetaminophen (TYLENOL) 500 MG tablet, Take 500 mg by mouth  cholecalciferol 25 MCG (1000 UT) TABS, Take 2,000 Units by mouth  levonorgestrel (MIRENA) 20 MCG/DAY IUD, 1 each by Intrauterine route once Placed in 2020  Multiple Vitamins-Minerals (MULTIVITAMIN ADULT PO),   spironolactone (ALDACTONE) 50 MG tablet, Take 1 tablet (50 mg) by mouth daily  gabapentin (NEURONTIN) 100 MG capsule, Take 1 capsule (100 mg) by mouth 3 times daily (Patient taking differently: Take 100 mg by mouth daily)    No current facility-administered medications on file prior to visit.       PHYSICAL EXAMINATION  VITALS: BP 98/65   Pulse 74   Resp 16   Wt 80.3 kg (177 lb)   BMI 29.01 kg/m    GENERAL: Healthy appearing, alert, no acute distress, normal habitus.  CARDIOVASCULAR:  Extremities warm and well-perfused.  NEUROLOGICAL:  Patient is awake and oriented to self, place and time.  Attention span is normal.  Memory is grossly intact.  Language is fluent and follows commands appropriately.  Appropriate fund of knowledge. Cranial nerves 2-12 are intact. There is no pronator drift.  Motor exam shows 5/5 strength in all extremities.  Tone is symmetric bilaterally in upper and lower extremities.  (Previously reflexes are symmetric and 2+ in upper extremities and lower extremities.)  Finger to nose is without dysmetria.    Gait is normal and the patient is able to do tandem walk .   Exam similar to before.      DIAGNOSTICS  MRI L spine  1.  Interval postoperative changes left discectomy at L4-L5 with   resolution of previously present left paracentral disc protrusion. Mild   right lateral recess narrowing, similar to prior. Overall mild canal   narrowing. Mild to moderate bilateral foraminal   narrowing, similar to prior.  2.  Interval resolution of left paracentral disc protrusion/extrusion at   L5-S1. No significant canal or foraminal narrowing.  3.  Tiny central disc protrusion with annular tear at L3-L4. Mild   bilateral lateral recess narrowing. Overall mild canal narrowing. No   significant change.  4.  Normal distal cord.      EMG  CLINICAL INTERPRETATION:  This is a normal nerve conduction and EMG study except absent left superficial peroneal sensory SNAP which could be artifactual or represent early sensory neuropathy. Further clinical correlation is needed.     MRI brain  MPRESSION:   1.  Normal head MRI.   2.  No acute/subacute infarction, intracranial hemorrhage, mass, mass   effect, or hydrocephalus.     RELEVANT LABS  .  Component      Latest Ref Rng & Units 12/5/2019 1/22/2020 2/26/2020 11/24/2020   CK, Total      30 - 190 U/L 256 (H) 398 (H) 121 454 (H)     Component Latest Ref Rng & Units 1/9/2019 7/19/2019   Sodium 136 - 145 mmol/L 140   Potassium 3.5 - 5.0 mmol/L 4.6    Chloride 98 - 107 mmol/L 107   CO2 22 - 31 mmol/L 21 (L)   Anion Gap, Calculation 5 - 18 mmol/L 12   Glucose 70 - 125 mg/dL 79   BUN 8 - 22 mg/dL 9   Creatinine 0.60 - 1.10 mg/dL 0.84   GFR MDRD Af Amer >60 mL/min/1.73m2 >60   GFR MDRD Non Af Amer >60 mL/min/1.73m2 >60   Bilirubin, Total 0.0 - 1.0 mg/dL 0.6   Calcium 8.5 - 10.5 mg/dL 9.4   Protein, Total 6.0 - 8.0 g/dL 6.7   ALBUMIN 3.5 - 5.0 g/dL 4.0   Alkaline Phosphatase 45 - 120 U/L 40 (L)   AST 0 - 40 U/L 44 (H)   ALT 0 - 45 U/L 39   Cholesterol <=199 mg/dL 205 (H)   Triglycerides <=149 mg/dL 101   HDL Cholesterol >=50 mg/dL 52   LDL Calculated <=129 mg/dL 133 (H)   Patient Fasting > 8hrs? Yes   Hemoglobin A1c 3.5 - 6.0 % 5.4   TSH 0.30 - 5.00 uIU/mL 1.49     IMPRESSION/REPORT/PLAN  Other migraine without status migrainosus, not intractable  Elevated CK  Muscle twitching  Stress  Lumbar foraminal stenosis    This is a 42 year old female episodic migraines.  Headaches are appropriately controlled with Topamax 25 mg every night during the summertime.  In the wintertime headaches have worsened we will try increasing the dose of Topamax in the wintertime.  Monitor for side effects.  Continue Imitrex for abortive therapy.    In terms of her muscle fasciculations/twitching EMG has not shown possible etiology.  Her blood work for CK was elevated but this was of unclear etiology.  Possibly she does have chronic elevated CK.  Did discuss biopsy in the past but will hold off as it is unlikely to show muscle disease and her symptoms are stable.  Stable on exam.  Will monitor.  Physical therapy is helping.    Continue to monitor bilateral carpal tunnel.  She has had surgery for the left side.  No significant numbness on the right side.    Would defer management of the lumbar neuroforominal stenosis to her surgeon.    Return in 1 year.    -     SUMAtriptan (IMITREX) 50 MG tablet; Take 1 tablet (50 mg) by mouth at onset of headache for migraine May repeat in 2 hours. Max 2  tablets/24 hours.  -     topiramate (TOPAMAX) 25 MG tablet; 25 mg every night during May-Dec. Jan-April 3 pills/night as needed and tolerated.    Return in about 1 year (around 1/26/2024) for In-Clinic Visit (must).     Over 31 minutes were spent coordinating the care for the patient on the day of the encounter.  This includes previsit, during visit and post visit activities as documented above.  Counseling patient.  Worsening problem.  Prescription management.  Multiple problems addressed.  (Activities include but not inclusive of reviewing chart, reviewing outside records, reviewing labs and imaging study results as well as the images, patient visit time including getting history and exam,  use if applicable, review of test results with the patient and coming up with a plan in a shared model, counseling patient and family, education and answering patient questions, EMR , EMR diagnosis entry and problem list management, medication reconciliation and prescription management if applicable, paperwork if applicable, printing documents and documentation of the visit activities.)      Rj Lopes MD  Neurologist  [facility]  Tel:- [phone]    This note was dictated using voice recognition software.  Any grammatical or context distortions are unintentional and inherent to the software.

## 2023-03-15 ENCOUNTER — LAB REQUISITION (OUTPATIENT)
Dept: LAB | Facility: CLINIC | Age: 43
End: 2023-03-15

## 2023-03-15 DIAGNOSIS — R87.613 HIGH GRADE SQUAMOUS INTRAEPITHELIAL LESION ON CYTOLOGIC SMEAR OF CERVIX (HGSIL): ICD-10-CM

## 2023-03-15 PROCEDURE — G0145 SCR C/V CYTO,THINLAYER,RESCR: HCPCS | Performed by: OBSTETRICS & GYNECOLOGY

## 2023-03-15 PROCEDURE — 87624 HPV HI-RISK TYP POOLED RSLT: CPT | Performed by: OBSTETRICS & GYNECOLOGY

## 2023-03-15 PROCEDURE — 88305 TISSUE EXAM BY PATHOLOGIST: CPT | Performed by: PATHOLOGY

## 2023-03-17 LAB
PATH REPORT.COMMENTS IMP SPEC: NORMAL
PATH REPORT.COMMENTS IMP SPEC: NORMAL
PATH REPORT.FINAL DX SPEC: NORMAL
PATH REPORT.GROSS SPEC: NORMAL
PATH REPORT.MICROSCOPIC SPEC OTHER STN: NORMAL
PATH REPORT.RELEVANT HX SPEC: NORMAL
PHOTO IMAGE: NORMAL

## 2023-03-20 LAB
BKR LAB AP GYN ADEQUACY: NORMAL
BKR LAB AP GYN INTERPRETATION: NORMAL
BKR LAB AP HPV REFLEX: NORMAL
BKR LAB AP LMP: NORMAL
BKR LAB AP PREVIOUS ABNORMAL: NORMAL
PATH REPORT.COMMENTS IMP SPEC: NORMAL
PATH REPORT.COMMENTS IMP SPEC: NORMAL
PATH REPORT.RELEVANT HX SPEC: NORMAL

## 2023-04-05 ENCOUNTER — TRANSFERRED RECORDS (OUTPATIENT)
Dept: HEALTH INFORMATION MANAGEMENT | Facility: CLINIC | Age: 43
End: 2023-04-05
Payer: COMMERCIAL

## 2023-04-10 ENCOUNTER — PATIENT OUTREACH (OUTPATIENT)
Dept: FAMILY MEDICINE | Facility: CLINIC | Age: 43
End: 2023-04-10
Payer: COMMERCIAL

## 2023-04-10 NOTE — TELEPHONE ENCOUNTER
03/15/23 [place] [facility] No HARITHA NIL Pap, +HR HPV (not 16 or 18) Plan per provider cotest in 6 months due 09/15/23 Done at [facility]   04/10/23 called Maura at [facility] to request [place] path note and recommendations

## 2023-04-11 DIAGNOSIS — L70.0 ACNE VULGARIS: ICD-10-CM

## 2023-04-12 RX ORDER — SPIRONOLACTONE 50 MG/1
TABLET, FILM COATED ORAL
Qty: 30 TABLET | Refills: 2 | Status: SHIPPED | OUTPATIENT
Start: 2023-04-12 | End: 2023-09-07

## 2023-04-12 NOTE — TELEPHONE ENCOUNTER
"Routing refill request to provider for review/approval because:  Labs not current:  Creatinine, sodium, potassium     Last Written Prescription Date: 1/4/23  Last Fill Quantity: 90  # refills: 0   Last office visit provider: 9/19/22    Requested Prescriptions   Pending Prescriptions Disp Refills     spironolactone (ALDACTONE) 50 MG tablet [Pharmacy Med Name: SPIRONOLACTONE 50 MG TABLET] 30 tablet 2     Sig: TAKE 1 TABLET BY MOUTH EVERY DAY       Diuretics (Including Combos) Protocol Failed - 4/11/2023 12:34 AM        Failed - Normal serum creatinine on file in past 12 months     Recent Labs   Lab Test 05/27/21  1458   CR 0.88              Failed - Normal serum potassium on file in past 12 months     Recent Labs   Lab Test 05/27/21  1458   POTASSIUM 4.4                    Failed - Normal serum sodium on file in past 12 months     Recent Labs   Lab Test 05/27/21  1458                 Passed - Blood pressure under 140/90 in past 12 months     BP Readings from Last 3 Encounters:   01/26/23 98/65   09/26/22 117/54   09/19/22 101/51                 Passed - Recent (12 mo) or future (30 days) visit within the authorizing provider's specialty     Patient has had an office visit with the authorizing provider or a provider within the authorizing providers department within the previous 12 mos or has a future within next 30 days. See \"Patient Info\" tab in inbasket, or \"Choose Columns\" in Meds & Orders section of the refill encounter.              Passed - Medication is active on med list        Passed - Patient is age 18 or older        Passed - No active pregancy on record        Passed - No positive pregnancy test in past 12 months             Gabby Blair RN 04/11/23 11:17 PM  "

## 2023-07-11 ENCOUNTER — PATIENT OUTREACH (OUTPATIENT)
Dept: CARE COORDINATION | Facility: CLINIC | Age: 43
End: 2023-07-11
Payer: COMMERCIAL

## 2023-09-07 ENCOUNTER — DOCUMENTATION ONLY (OUTPATIENT)
Dept: OTHER | Facility: CLINIC | Age: 43
End: 2023-09-07

## 2023-09-07 ENCOUNTER — OFFICE VISIT (OUTPATIENT)
Dept: FAMILY MEDICINE | Facility: CLINIC | Age: 43
End: 2023-09-07
Payer: COMMERCIAL

## 2023-09-07 VITALS
DIASTOLIC BLOOD PRESSURE: 64 MMHG | HEART RATE: 60 BPM | HEIGHT: 66 IN | WEIGHT: 156 LBS | BODY MASS INDEX: 25.07 KG/M2 | SYSTOLIC BLOOD PRESSURE: 116 MMHG | OXYGEN SATURATION: 100 %

## 2023-09-07 DIAGNOSIS — Z76.89 ENCOUNTER FOR WEIGHT MANAGEMENT: ICD-10-CM

## 2023-09-07 DIAGNOSIS — F32.0 MILD MAJOR DEPRESSION (H): ICD-10-CM

## 2023-09-07 DIAGNOSIS — L70.0 ACNE VULGARIS: ICD-10-CM

## 2023-09-07 DIAGNOSIS — Z98.890 H/O LEEP: ICD-10-CM

## 2023-09-07 DIAGNOSIS — Z00.01 ENCOUNTER FOR ROUTINE ADULT MEDICAL EXAM WITH ABNORMAL FINDINGS: Primary | ICD-10-CM

## 2023-09-07 DIAGNOSIS — Z13.228 SCREENING FOR METABOLIC DISORDER: ICD-10-CM

## 2023-09-07 DIAGNOSIS — R87.613 PAPANICOLAOU SMEAR OF CERVIX WITH HIGH GRADE SQUAMOUS INTRAEPITHELIAL LESION (HGSIL): ICD-10-CM

## 2023-09-07 PROBLEM — N87.1 CERVICAL INTRAEPITHELIAL NEOPLASIA GRADE 2: Status: ACTIVE | Noted: 2023-09-07

## 2023-09-07 LAB
CHOLEST SERPL-MCNC: 180 MG/DL
ERYTHROCYTE [DISTWIDTH] IN BLOOD BY AUTOMATED COUNT: 12.3 % (ref 10–15)
HBA1C MFR BLD: 5.1 % (ref 0–5.6)
HCT VFR BLD AUTO: 39.5 % (ref 35–47)
HDLC SERPL-MCNC: 53 MG/DL
HGB BLD-MCNC: 13.2 G/DL (ref 11.7–15.7)
LDLC SERPL CALC-MCNC: 112 MG/DL
MCH RBC QN AUTO: 33.2 PG (ref 26.5–33)
MCHC RBC AUTO-ENTMCNC: 33.4 G/DL (ref 31.5–36.5)
MCV RBC AUTO: 100 FL (ref 78–100)
NONHDLC SERPL-MCNC: 127 MG/DL
PLATELET # BLD AUTO: 268 10E3/UL (ref 150–450)
RBC # BLD AUTO: 3.97 10E6/UL (ref 3.8–5.2)
TRIGL SERPL-MCNC: 77 MG/DL
WBC # BLD AUTO: 6.9 10E3/UL (ref 4–11)

## 2023-09-07 PROCEDURE — 99396 PREV VISIT EST AGE 40-64: CPT | Mod: 25 | Performed by: FAMILY MEDICINE

## 2023-09-07 PROCEDURE — 83036 HEMOGLOBIN GLYCOSYLATED A1C: CPT | Performed by: FAMILY MEDICINE

## 2023-09-07 PROCEDURE — 99212 OFFICE O/P EST SF 10 MIN: CPT | Mod: 25 | Performed by: FAMILY MEDICINE

## 2023-09-07 PROCEDURE — 80061 LIPID PANEL: CPT | Performed by: FAMILY MEDICINE

## 2023-09-07 PROCEDURE — 90471 IMMUNIZATION ADMIN: CPT | Performed by: FAMILY MEDICINE

## 2023-09-07 PROCEDURE — 96127 BRIEF EMOTIONAL/BEHAV ASSMT: CPT | Performed by: FAMILY MEDICINE

## 2023-09-07 PROCEDURE — 85027 COMPLETE CBC AUTOMATED: CPT | Performed by: FAMILY MEDICINE

## 2023-09-07 PROCEDURE — 90686 IIV4 VACC NO PRSV 0.5 ML IM: CPT | Performed by: FAMILY MEDICINE

## 2023-09-07 PROCEDURE — 36415 COLL VENOUS BLD VENIPUNCTURE: CPT | Performed by: FAMILY MEDICINE

## 2023-09-07 RX ORDER — SPIRONOLACTONE 50 MG/1
50 TABLET, FILM COATED ORAL DAILY
Qty: 30 TABLET | Refills: 2 | Status: SHIPPED | OUTPATIENT
Start: 2023-09-07 | End: 2023-12-04

## 2023-09-07 RX ORDER — PHENTERMINE HYDROCHLORIDE 37.5 MG/1
37.5 CAPSULE ORAL EVERY MORNING
COMMUNITY
End: 2023-09-07

## 2023-09-07 RX ORDER — PHENTERMINE HYDROCHLORIDE 37.5 MG/1
37.5 CAPSULE ORAL EVERY MORNING
Qty: 90 CAPSULE | Refills: 0 | Status: SHIPPED | OUTPATIENT
Start: 2023-09-07 | End: 2024-06-24

## 2023-09-07 ASSESSMENT — ENCOUNTER SYMPTOMS
SHORTNESS OF BREATH: 0
COUGH: 0
WEAKNESS: 0
CHILLS: 0
DIARRHEA: 0
HEADACHES: 0
ARTHRALGIAS: 0
HEMATOCHEZIA: 0
NAUSEA: 0
FEVER: 0
SORE THROAT: 0
PALPITATIONS: 0
EYE PAIN: 0
HEARTBURN: 0
NERVOUS/ANXIOUS: 0
JOINT SWELLING: 0
ABDOMINAL PAIN: 0
DIZZINESS: 0
BREAST MASS: 0
HEMATURIA: 0
PARESTHESIAS: 0
MYALGIAS: 0
DYSURIA: 0
CONSTIPATION: 0
FREQUENCY: 0

## 2023-09-07 ASSESSMENT — PATIENT HEALTH QUESTIONNAIRE - PHQ9
SUM OF ALL RESPONSES TO PHQ QUESTIONS 1-9: 5
SUM OF ALL RESPONSES TO PHQ QUESTIONS 1-9: 5
10. IF YOU CHECKED OFF ANY PROBLEMS, HOW DIFFICULT HAVE THESE PROBLEMS MADE IT FOR YOU TO DO YOUR WORK, TAKE CARE OF THINGS AT HOME, OR GET ALONG WITH OTHER PEOPLE: NOT DIFFICULT AT ALL

## 2023-09-07 NOTE — PROGRESS NOTES
SUBJECTIVE:   CC: Larissa Elliott 43 year old   who presents for preventive health visit.       Patient has been advised of split billing requirements and indicates understanding: Yes    I spent 10 minutes with the patient total  from the the prevent visit, >50% of which was in counseling regarding the patient's medical issues as noted above.      Healthy Habits:     Getting at least 3 servings of Calcium per day:  Yes    Bi-annual eye exam:  Yes    Dental care twice a year:  Yes    Sleep apnea or symptoms of sleep apnea:  None    Diet:  Regular (no restrictions)    Frequency of exercise:  2-3 days/week    Duration of exercise:  30-45 minutes    Taking medications regularly:  Yes    Medication side effects:  Not applicable    Additional concerns today:  No    Wt Readings from Last 3 Encounters:   09/07/23 70.8 kg (156 lb)   01/26/23 80.3 kg (177 lb)   09/26/22 84 kg (185 lb 3 oz)        Weight management : did 2 month of phentermine along with 10,000 steps , intermittent fasting able to loose 30 lbs    history of LEEP: plan to have Follow up colp +pap every 6 month       ASSESSMENT/PLAN:   Larissa was seen today for physical.    Diagnoses and all orders for this visit:    Encounter for routine adult medical exam with abnormal findings    Screening for metabolic disorder  -     Hemoglobin A1c; Future  -     Lipid Profile; Future  -     CBC with platelets; Future  -     Hemoglobin A1c  -     Lipid Profile  -     CBC with platelets    Papanicolaou smear of cervix with high grade squamous intraepithelial lesion (HGSIL)  Comments:  patient following GYN specialist and plan to do follow up pap next month after the LEEP    H/O LEEP 9 /2022    Mild major depression (H)    Acne vulgaris  -     spironolactone (ALDACTONE) 50 MG tablet; Take 1 tablet (50 mg) by mouth daily    Encounter for weight management  -     phentermine (ADIPEX-P) 37.5 MG capsule; Take 1 capsule (37.5 mg) by mouth every morning  -     CBC with  "platelets; Future  -     CBC with platelets    Other orders  -     REVIEW OF HEALTH MAINTENANCE PROTOCOL ORDERS  -     INFLUENZA VACCINE IM > 6 MONTHS VALENT IIV4 (AFLURIA/FLUZONE)  -     PRIMARY CARE FOLLOW-UP SCHEDULING; Future        Patient has been advised of split billing requirements and indicates understanding: Yes        11/2/2021    10:00 AM 7/14/2022     3:13 PM 9/7/2023    12:29 PM   PHQ   PHQ-9 Total Score 4 0 5   Q9: Thoughts of better off dead/self-harm past 2 weeks Not at all Not at all Not at all        Today's PHQ-2 Score:       7/14/2022     3:13 PM   PHQ-2 ( 1999 [employer])   Q1: Little interest or pleasure in doing things 0   Q2: Feeling down, depressed or hopeless 0   PHQ-2 Score 0   Q1: Little interest or pleasure in doing things Not at all   Q2: Feeling down, depressed or hopeless Not at all   PHQ-2 Score 0       Abuse: Current or Past (Physical, Sexual or Emotional) - No  Do you feel safe in your environment? Yes    Have you ever done Advance Care Planning? (For example, a Health Directive, POLST, or a discussion with a medical provider or your loved ones about your wishes): No, advance care planning information given to patient to review.  Advanced care planning was discussed at today's visit.    Social History     Tobacco Use    Smoking status: Former    Smokeless tobacco: Never   Substance Use Topics    Alcohol use: Yes     Alcohol/week: 1.0 standard drink of alcohol     Comment: Special occasions only           COUNSELING:  Reviewed preventive health counseling, as reflected in patient instructions       Regular exercise       Healthy diet/nutrition       Vision screening       Hearing screening       Contraception       Advance Care Planning    Estimated body mass index is 25.56 kg/m  as calculated from the following:    Height as of this encounter: 1.664 m (5' 5.5\").    Weight as of this encounter: 70.8 kg (156 lb).    Weight management plan: Discussed healthy diet and exercise " guidelines    She reports that she has quit smoking. She has never used smokeless tobacco.      Reviewed orders with patient.  Reviewed health maintenance and updated orders accordingly - Yes  Lab work is in process  Labs reviewed in EPIC  BP Readings from Last 3 Encounters:   09/07/23 116/64   01/26/23 98/65   09/26/22 117/54    Wt Readings from Last 3 Encounters:   09/07/23 70.8 kg (156 lb)   01/26/23 80.3 kg (177 lb)   09/26/22 84 kg (185 lb 3 oz)                 Patient Active Problem List   Diagnosis    Chronic migraine    Foraminal stenosis of lumbar region    PCOS (polycystic ovarian syndrome)    Symptomatic varicose veins, bilateral    Mild major depression (H)    Carpal tunnel syndrome of left wrist    IUD (intrauterine device) in place    Papanicolaou smear of cervix with high grade squamous intraepithelial lesion (HGSIL)    Endometriosis     Past Surgical History:   Procedure Laterality Date    BACK SURGERY Left 2003    L5 S1    CONIZATION LEEP N/A 9/26/2022    Procedure: LOOP ELECTROSURGICAL EXCISION PROCEDURE (LEEP), REMOVAL AND INSERTION OF MIRENA INTRAUTERINE DEVICE;  Surgeon: Uyen Forbes MD;  Location: [facility]    ENDOMETRIAL ABLATION      GYNECOLOGIC CRYOSURGERY      HC REVISE MEDIAN N/CARPAL TUNNEL SURG Left 2/26/2021    Procedure: LEFT CARPAL TUNNEL RELEASE;  Surgeon: Anna Hameed MD;  Location: [facility] OR;  Service: Neurosurgery    LAPAROTOMY EXPLORATORY      LUMBAR DISCECTOMY Left 1/30/2015    Procedure: LEFT LUMBAR 4-5 MICRODISCECTOMY AND REVISION LEFT LUMBAR 5-SACRAL 1 MICRODISCECTOMY;  Surgeon: Mick Bolton MD;  Location: [facility] OR;  Service:     LUMBAR DISCECTOMY Left 1/30/2015    Procedure: LEFT L4-5 MICRODISCECTOMY AND REVISION LEFT L5-S1 MICRODISCECTOMY;  Surgeon: Mick Bolton MD;  Location: [facility] OR;  Service:     OTHER SURGICAL HISTORY Bilateral 2003    back surgeryDisc ectomy L5/S1    NV LAMNOTMY INCL  W/DCMPRSN NRV ROOT 1 INTRSPC LUMBR Right 3/5/2020    Procedure: MICRODISCECTOMY RIGHT LUMBAR 4-5 LATERAL RECESS DECOMPRESSION RIGHT LUMBAR 4-5;  Surgeon: Mick Bolton MD;  Location: [facility] OR;  Service: Spine    RI LAMNOTMY INCL W/DCMPRSN NRV ROOT 1 INTRSPC LUMBR Right 6/3/2021    Procedure: RIGHT LUMBAR 4-5 REVISION MICRODISCECTOMY;  Surgeon: Mick Bolton MD;  Location: [facility] OR;  Service: Spine    SPINE SURGERY         Social History     Tobacco Use    Smoking status: Former    Smokeless tobacco: Never   Substance Use Topics    Alcohol use: Yes     Alcohol/week: 1.0 standard drink of alcohol     Comment: Special occasions only     Family History   Problem Relation Age of Onset    Migraines Father     Migraines Brother     Seizure Disorder Brother     Asthma Father     Sarcoidosis Father     Asthma Brother     Breast Cancer Maternal Grandmother         Premenopausal    Chronic Obstructive Pulmonary Disease Paternal Grandfather     Psoriasis Mother            Current Outpatient Medications   Medication Sig Dispense Refill    acetaminophen (TYLENOL) 500 MG tablet Take 500 mg by mouth      levonorgestrel (MIRENA) 20 MCG/DAY IUD 1 each by Intrauterine route once Placed in 2020      Multiple Vitamins-Minerals (MULTIVITAMIN ADULT PO)       phentermine (ADIPEX-P) 37.5 MG capsule Take 1 capsule (37.5 mg) by mouth every morning 90 capsule 0    spironolactone (ALDACTONE) 50 MG tablet Take 1 tablet (50 mg) by mouth daily 30 tablet 2    SUMAtriptan (IMITREX) 50 MG tablet Take 1 tablet (50 mg) by mouth at onset of headache for migraine May repeat in 2 hours. Max 2 tablets/24 hours. 18 tablet 11    cholecalciferol 25 MCG (1000 UT) TABS Take 2,000 Units by mouth       Allergies   Allergen Reactions    Adhesive Tape Rash    Latex Rash       Recent Labs   Lab Test 09/07/23  1309 12/14/22  1237 07/14/22  1550 11/02/21  1121 05/27/21  1458 04/20/21  1039 02/26/20  1413 12/05/19  0837  07/19/19  1013 01/09/19  0829   A1C 5.1 5.3 5.1 5.3  --   --   --  5.0   < >  --    LDL  --  110* 107* 143*  --   --    < >  --    < > 117   HDL  --  53 60 53  --   --    < >  --    < > 52   TRIG  --  83 99 91  --   --    < >  --    < > 77   ALT  --   --   --   --   --   --   --  20  --  39   CR  --   --   --   --  0.88 0.86   < > 0.93  --  0.84   GFRESTIMATED  --   --   --   --  >60 >60   < > >60  --  >60   GFRESTBLACK  --   --   --   --  >60 >60   < > >60  --  >60   POTASSIUM  --   --   --   --  4.4 4.6   < > 4.3  --  4.6   TSH  --  0.55  --  1.07  --   --   --  0.87   < > 0.93    < > = values in this interval not displayed.        Breast Cancer Screening:  Any new diagnosis of family breast, ovarian, or bowel cancer? No    Pertinent mammograms are reviewed under the imaging tab.    History of abnormal Pap smear: YES - other categories - see link Cervical Cytology Screening Guidelines      Latest Ref Rng & Units 3/15/2023     9:58 AM 9/7/2022    11:03 AM 7/14/2022     3:39 PM   PAP / HPV   PAP  Negative for Intraepithelial Lesion or Malignancy (NILM)   High-grade squamous intraepithelial lesion (HSIL) encompassing mod/severe dysplasia, CIS, CIN2, CIN3    HPV 16 DNA Negative Negative  Negative  Negative    HPV 18 DNA Negative Negative  Negative  Negative    Other HR HPV Negative Positive  Positive  Positive      Reviewed and updated as needed this visit by clinical staff   Tobacco  Allergies  Meds  Problems  Med Hx  Surg Hx  Fam Hx          Reviewed and updated as needed this visit by Provider   Tobacco  Allergies  Meds  Problems  Med Hx  Surg Hx  Fam Hx         Past Medical History:   Diagnosis Date    Abnormal Pap smear of cervix     Was told Precancerous cells & HPV    History of anesthesia complications     slow wake up    Migraines     Moderate major depression (H) 02/19/2021    PCOS (polycystic ovarian syndrome)       Past Surgical History:   Procedure Laterality Date    BACK SURGERY Left 2003     L5 S1    CONIZATION LEEP N/A 9/26/2022    Procedure: LOOP ELECTROSURGICAL EXCISION PROCEDURE (LEEP), REMOVAL AND INSERTION OF MIRENA INTRAUTERINE DEVICE;  Surgeon: Uyen Forbes MD;  Location: [facility] OR    ENDOMETRIAL ABLATION      GYNECOLOGIC CRYOSURGERY      HC REVISE MEDIAN N/CARPAL TUNNEL SURG Left 2/26/2021    Procedure: LEFT CARPAL TUNNEL RELEASE;  Surgeon: Anna Hameed MD;  Location: [facility] OR;  Service: Neurosurgery    LAPAROTOMY EXPLORATORY      LUMBAR DISCECTOMY Left 1/30/2015    Procedure: LEFT LUMBAR 4-5 MICRODISCECTOMY AND REVISION LEFT LUMBAR 5-SACRAL 1 MICRODISCECTOMY;  Surgeon: Mick Bolton MD;  Location: [facility] OR;  Service:     LUMBAR DISCECTOMY Left 1/30/2015    Procedure: LEFT L4-5 MICRODISCECTOMY AND REVISION LEFT L5-S1 MICRODISCECTOMY;  Surgeon: Mick Bolton MD;  Location: [facility] OR;  Service:     OTHER SURGICAL HISTORY Bilateral 2003    back surgeryDisc ectomy L5/S1    MI LAMNOTMY INCL W/DCMPRSN NRV ROOT 1 INTRSPC LUMBR Right 3/5/2020    Procedure: MICRODISCECTOMY RIGHT LUMBAR 4-5 LATERAL RECESS DECOMPRESSION RIGHT LUMBAR 4-5;  Surgeon: Mick Bolton MD;  Location: [facility] OR;  Service: Spine    MI LAMNOTMY INCL W/DCMPRSN NRV ROOT 1 INTRSPC LUMBR Right 6/3/2021    Procedure: RIGHT LUMBAR 4-5 REVISION MICRODISCECTOMY;  Surgeon: Mick Bolton MD;  Location: [facility] OR;  Service: Spine    SPINE SURGERY         Review of Systems   Constitutional:  Negative for chills and fever.   HENT:  Negative for congestion, ear pain, hearing loss and sore throat.    Eyes:  Negative for pain and visual disturbance.   Respiratory:  Negative for cough and shortness of breath.    Cardiovascular:  Negative for chest pain, palpitations and peripheral edema.   Gastrointestinal:  Negative for abdominal pain, constipation, diarrhea, heartburn, hematochezia and nausea.   Breasts:  Negative for  "tenderness, breast mass and discharge.   Genitourinary:  Negative for dysuria, frequency, genital sores, hematuria, pelvic pain, urgency, vaginal bleeding and vaginal discharge.   Musculoskeletal:  Negative for arthralgias, joint swelling and myalgias.   Skin:  Negative for rash.   Neurological:  Negative for dizziness, weakness, headaches and paresthesias.   Psychiatric/Behavioral:  Negative for mood changes. The patient is not nervous/anxious.           OBJECTIVE:   /64   Pulse 60   Ht 1.664 m (5' 5.5\")   Wt 70.8 kg (156 lb)   SpO2 100%   BMI 25.56 kg/m    Physical Exam  GENERAL: healthy, alert and no distress  EYES: Eyes grossly normal to inspection, PERRL and conjunctivae and sclerae normal  HENT: ear canals and TM's normal, nose and mouth without ulcers or lesions  NECK: no adenopathy, no asymmetry, masses, or scars and thyroid normal to palpation  RESP: lungs clear to auscultation - no rales, rhonchi or wheezes  BREAST: normal without masses, tenderness or nipple discharge and no palpable axillary masses or adenopathy  CV: regular rate and rhythm, normal S1 S2, no S3 or S4, no murmur, click or rub, no peripheral edema and peripheral pulses strong  ABDOMEN: soft, nontender, no hepatosplenomegaly, no masses and bowel sounds normal   (female): deferred  MS: no gross musculoskeletal defects noted, no edema  SKIN: no suspicious lesions or rashes  PSYCH: mentation appears normal, affect normal/bright    Diagnostic Test Results:  Labs reviewed in Epic      Orly Almanza MD  [facility] Answers submitted by the patient for this visit:  Patient Health Questionnaire (Submitted on 9/7/2023)  If you checked off any problems, how difficult have these problems made it for you to do your work, take care of things at home, or get along with other people?: Not difficult at all  PHQ9 TOTAL SCORE: 5    "

## 2023-09-13 ENCOUNTER — TELEPHONE (OUTPATIENT)
Dept: FAMILY MEDICINE | Facility: CLINIC | Age: 43
End: 2023-09-13
Payer: COMMERCIAL

## 2023-09-13 NOTE — TELEPHONE ENCOUNTER
History of SIADH, monitor. May need to fluid restrict her some and encourage more solid PO intake as she has decreased her oral intake in the past few months     PRIOR AUTHORIZATION DENIED    Medication: PHENTERMINE HCL 37.5 MG PO CAPS  Insurance Company: [employer] - Phone [phone] Fax [phone]  Denial Date: 9/13/2023  Denial Rational:     Appeal Information:     Patient Notified: No

## 2023-09-14 ENCOUNTER — LAB REQUISITION (OUTPATIENT)
Dept: LAB | Facility: CLINIC | Age: 43
End: 2023-09-14

## 2023-09-14 DIAGNOSIS — Z11.3 ENCOUNTER FOR SCREENING FOR INFECTIONS WITH A PREDOMINANTLY SEXUAL MODE OF TRANSMISSION: ICD-10-CM

## 2023-09-14 DIAGNOSIS — R87.810 CERVICAL HIGH RISK HUMAN PAPILLOMAVIRUS (HPV) DNA TEST POSITIVE: ICD-10-CM

## 2023-09-14 PROCEDURE — G0124 SCREEN C/V THIN LAYER BY MD: HCPCS | Performed by: PATHOLOGY

## 2023-09-14 PROCEDURE — 87624 HPV HI-RISK TYP POOLED RSLT: CPT | Performed by: OBSTETRICS & GYNECOLOGY

## 2023-09-14 PROCEDURE — 88305 TISSUE EXAM BY PATHOLOGIST: CPT | Performed by: STUDENT IN AN ORGANIZED HEALTH CARE EDUCATION/TRAINING PROGRAM

## 2023-09-14 PROCEDURE — 87389 HIV-1 AG W/HIV-1&-2 AB AG IA: CPT | Performed by: OBSTETRICS & GYNECOLOGY

## 2023-09-14 PROCEDURE — 86803 HEPATITIS C AB TEST: CPT | Performed by: OBSTETRICS & GYNECOLOGY

## 2023-09-14 PROCEDURE — G0145 SCR C/V CYTO,THINLAYER,RESCR: HCPCS | Performed by: OBSTETRICS & GYNECOLOGY

## 2023-09-14 PROCEDURE — 86780 TREPONEMA PALLIDUM: CPT | Performed by: OBSTETRICS & GYNECOLOGY

## 2023-09-14 PROCEDURE — 87340 HEPATITIS B SURFACE AG IA: CPT | Performed by: OBSTETRICS & GYNECOLOGY

## 2023-09-14 PROCEDURE — 87491 CHLMYD TRACH DNA AMP PROBE: CPT | Performed by: OBSTETRICS & GYNECOLOGY

## 2023-09-14 PROCEDURE — 87591 N.GONORRHOEAE DNA AMP PROB: CPT | Performed by: OBSTETRICS & GYNECOLOGY

## 2023-09-14 NOTE — TELEPHONE ENCOUNTER
Advise patient to use good Rx coupon and not use insurance as sometime it could be cheaper that way    Orly Almanza MD

## 2023-09-15 LAB
C TRACH DNA SPEC QL PROBE+SIG AMP: NEGATIVE
N GONORRHOEA DNA SPEC QL NAA+PROBE: NEGATIVE
T PALLIDUM AB SER QL: NONREACTIVE

## 2023-09-18 LAB
HBV SURFACE AG SERPL QL IA: NONREACTIVE
HCV AB SERPL QL IA: NONREACTIVE
HIV 1+2 AB+HIV1 P24 AG SERPL QL IA: NONREACTIVE
PATH REPORT.COMMENTS IMP SPEC: NORMAL
PATH REPORT.COMMENTS IMP SPEC: NORMAL
PATH REPORT.FINAL DX SPEC: NORMAL
PATH REPORT.GROSS SPEC: NORMAL
PATH REPORT.MICROSCOPIC SPEC OTHER STN: NORMAL
PATH REPORT.RELEVANT HX SPEC: NORMAL
PHOTO IMAGE: NORMAL

## 2023-09-19 LAB
BKR LAB AP GYN ADEQUACY: ABNORMAL
BKR LAB AP GYN INTERPRETATION: ABNORMAL
BKR LAB AP HPV REFLEX: ABNORMAL
BKR LAB AP LMP: ABNORMAL
BKR LAB AP PREVIOUS ABNL DX: ABNORMAL
BKR LAB AP PREVIOUS ABNORMAL: ABNORMAL
PATH REPORT.COMMENTS IMP SPEC: ABNORMAL
PATH REPORT.COMMENTS IMP SPEC: ABNORMAL
PATH REPORT.RELEVANT HX SPEC: ABNORMAL

## 2023-09-26 ENCOUNTER — PATIENT OUTREACH (OUTPATIENT)
Dept: FAMILY MEDICINE | Facility: CLINIC | Age: 43
End: 2023-09-26
Payer: COMMERCIAL

## 2023-09-26 DIAGNOSIS — R87.613 PAPANICOLAOU SMEAR OF CERVIX WITH HIGH GRADE SQUAMOUS INTRAEPITHELIAL LESION (HGSIL): Primary | ICD-10-CM

## 2023-09-26 NOTE — TELEPHONE ENCOUNTER
09/14/23 [facility] Pap, +HR HPV (not 16 or 18) [facility] No HARITHA  Plan provider review (done at [facility])

## 2023-11-08 ENCOUNTER — LAB REQUISITION (OUTPATIENT)
Dept: LAB | Facility: CLINIC | Age: 43
End: 2023-11-08

## 2023-11-08 DIAGNOSIS — R39.9 UNSPECIFIED SYMPTOMS AND SIGNS INVOLVING THE GENITOURINARY SYSTEM: ICD-10-CM

## 2023-11-08 LAB
ALBUMIN UR-MCNC: NEGATIVE MG/DL
APPEARANCE UR: CLEAR
BACTERIA #/AREA URNS HPF: ABNORMAL /HPF
BILIRUB UR QL STRIP: NEGATIVE
COLOR UR AUTO: ABNORMAL
GLUCOSE UR STRIP-MCNC: NEGATIVE MG/DL
HGB UR QL STRIP: NEGATIVE
KETONES UR STRIP-MCNC: NEGATIVE MG/DL
LEUKOCYTE ESTERASE UR QL STRIP: NEGATIVE
NITRATE UR QL: NEGATIVE
PH UR STRIP: 7.5 [PH] (ref 5–7)
RBC URINE: <1 /HPF
SP GR UR STRIP: 1 (ref 1–1.03)
SQUAMOUS EPITHELIAL: <1 /HPF
UROBILINOGEN UR STRIP-MCNC: NORMAL MG/DL
WBC URINE: <1 /HPF

## 2023-11-08 PROCEDURE — 81001 URINALYSIS AUTO W/SCOPE: CPT | Performed by: OBSTETRICS & GYNECOLOGY

## 2023-11-17 ENCOUNTER — HOSPITAL ENCOUNTER (OUTPATIENT)
Dept: MAMMOGRAPHY | Facility: CLINIC | Age: 43
Discharge: HOME | End: 2023-11-17
Attending: FAMILY MEDICINE | Admitting: FAMILY MEDICINE
Payer: COMMERCIAL

## 2023-11-17 DIAGNOSIS — Z12.31 VISIT FOR SCREENING MAMMOGRAM: ICD-10-CM

## 2023-11-17 PROCEDURE — 77067 SCR MAMMO BI INCL CAD: CPT

## 2023-12-03 DIAGNOSIS — L70.0 ACNE VULGARIS: ICD-10-CM

## 2023-12-04 RX ORDER — SPIRONOLACTONE 50 MG/1
50 TABLET, FILM COATED ORAL DAILY
Qty: 30 TABLET | Refills: 2 | Status: SHIPPED | OUTPATIENT
Start: 2023-12-04 | End: 2024-03-04

## 2023-12-12 ENCOUNTER — LAB REQUISITION (OUTPATIENT)
Dept: LAB | Facility: CLINIC | Age: 43
End: 2023-12-12

## 2023-12-12 DIAGNOSIS — R87.610 ATYPICAL SQUAMOUS CELLS OF UNDETERMINED SIGNIFICANCE ON CYTOLOGIC SMEAR OF CERVIX (ASC-US): ICD-10-CM

## 2023-12-12 PROCEDURE — 88305 TISSUE EXAM BY PATHOLOGIST: CPT | Performed by: PATHOLOGY

## 2023-12-12 PROCEDURE — 87624 HPV HI-RISK TYP POOLED RSLT: CPT | Performed by: OBSTETRICS & GYNECOLOGY

## 2023-12-12 PROCEDURE — G0145 SCR C/V CYTO,THINLAYER,RESCR: HCPCS | Performed by: OBSTETRICS & GYNECOLOGY

## 2023-12-12 PROCEDURE — G0124 SCREEN C/V THIN LAYER BY MD: HCPCS | Performed by: PATHOLOGY

## 2023-12-18 LAB
HUMAN PAPILLOMA VIRUS 16 DNA: NEGATIVE
HUMAN PAPILLOMA VIRUS 18 DNA: POSITIVE
HUMAN PAPILLOMA VIRUS FINAL DIAGNOSIS: ABNORMAL
HUMAN PAPILLOMA VIRUS OTHER HR: POSITIVE

## 2024-01-02 ENCOUNTER — PATIENT OUTREACH (OUTPATIENT)
Dept: FAMILY MEDICINE | Facility: CLINIC | Age: 44
End: 2024-01-02
Payer: COMMERCIAL

## 2024-01-02 PROBLEM — R87.613 PAPANICOLAOU SMEAR OF CERVIX WITH HIGH GRADE SQUAMOUS INTRAEPITHELIAL LESION (HGSIL): Status: ACTIVE | Noted: 2022-07-14

## 2024-01-02 NOTE — TELEPHONE ENCOUNTER
12/12/23 [place] [facility] No [facility]ITHA [facility] Pap, +HR HPV types 18 and other Plan cotest in 1 year due 12/12/24 per guidelines Done at [facility]

## 2024-03-04 DIAGNOSIS — L70.0 ACNE VULGARIS: ICD-10-CM

## 2024-03-04 RX ORDER — SPIRONOLACTONE 50 MG/1
50 TABLET, FILM COATED ORAL DAILY
Qty: 30 TABLET | Refills: 2 | Status: SHIPPED | OUTPATIENT
Start: 2024-03-04 | End: 2024-06-10

## 2024-03-13 ENCOUNTER — OFFICE VISIT (OUTPATIENT)
Dept: INTERNAL MEDICINE | Facility: CLINIC | Age: 44
End: 2024-03-13
Payer: COMMERCIAL

## 2024-03-13 VITALS
HEIGHT: 66 IN | OXYGEN SATURATION: 97 % | WEIGHT: 156 LBS | DIASTOLIC BLOOD PRESSURE: 56 MMHG | RESPIRATION RATE: 18 BRPM | HEART RATE: 80 BPM | SYSTOLIC BLOOD PRESSURE: 106 MMHG | BODY MASS INDEX: 25.07 KG/M2 | TEMPERATURE: 98.6 F

## 2024-03-13 DIAGNOSIS — M79.89 SWELLING OF BOTH HANDS: Primary | ICD-10-CM

## 2024-03-13 LAB — ERYTHROCYTE [SEDIMENTATION RATE] IN BLOOD BY WESTERGREN METHOD: 7 MM/HR (ref 0–20)

## 2024-03-13 PROCEDURE — 86200 CCP ANTIBODY: CPT | Performed by: NURSE PRACTITIONER

## 2024-03-13 PROCEDURE — 36415 COLL VENOUS BLD VENIPUNCTURE: CPT | Performed by: NURSE PRACTITIONER

## 2024-03-13 PROCEDURE — 85652 RBC SED RATE AUTOMATED: CPT | Performed by: NURSE PRACTITIONER

## 2024-03-13 PROCEDURE — 86039 ANTINUCLEAR ANTIBODIES (ANA): CPT | Performed by: NURSE PRACTITIONER

## 2024-03-13 PROCEDURE — 86431 RHEUMATOID FACTOR QUANT: CPT | Performed by: NURSE PRACTITIONER

## 2024-03-13 PROCEDURE — 86225 DNA ANTIBODY NATIVE: CPT | Performed by: NURSE PRACTITIONER

## 2024-03-13 PROCEDURE — 99213 OFFICE O/P EST LOW 20 MIN: CPT | Performed by: NURSE PRACTITIONER

## 2024-03-13 PROCEDURE — 86038 ANTINUCLEAR ANTIBODIES: CPT | Performed by: NURSE PRACTITIONER

## 2024-03-13 PROCEDURE — 86140 C-REACTIVE PROTEIN: CPT | Performed by: NURSE PRACTITIONER

## 2024-03-13 RX ORDER — PREDNISONE 10 MG/1
10 TABLET ORAL 3 TIMES DAILY
COMMUNITY
Start: 2024-03-09 | End: 2024-09-11

## 2024-03-13 NOTE — PATIENT INSTRUCTIONS
I suspect that you are dealing with some sort of inflammatory arthritis.    You should call [facility] and request to be seen by rheumatologist.

## 2024-03-13 NOTE — PROGRESS NOTES
"  Assessment & Plan     Swelling of both hands  She was down in [place] recently.  She woke up 1 day with severely swollen and painful hands.  She had some notable redness involving joints.  Unclear about whether there was any warmth associated.    She was given prednisone in [place] but a lower 10 mg TID dose.  She is not necessarily interested in increasing the dose of her prednisone right now.    I suspect inflammatory arthritis.  We will check inflammatory markers.  Suggested that she call [facility] and set up an appointment with rheumatology    - CRP, inflammation; Future  - Cyclic Citrullinated Peptide Antibody IgG; Future  - DNA double stranded antibodies; Future  - Rheumatoid factor; Future  - ESR: Erythrocyte sedimentation rate; Future  - Anti Nuclear Roxy IgG by IFA with Reflex; Future    Patient Instructions   I suspect that you are dealing with some sort of inflammatory arthritis.    You should call [facility] and request to be seen by rheumatologist.              BMI  Estimated body mass index is 25.56 kg/m  as calculated from the following:    Height as of this encounter: 1.664 m (5' 5.5\").    Weight as of this encounter: 70.8 kg (156 lb).             Eden Dias is a 43 year old, presenting for the following health issues:  Hand Pain (And swelling X Friday night/Saturday morning, woke her up in the middle of the night. Was seen at urgent care in [place] on Saturday)      3/13/2024    11:52 AM   Additional Questions   Roomed by Rosey IBARRA     Hand Pain    History of Present Illness       Reason for visit:  Bilateral hand pain  Symptom onset:  3-7 days ago    She eats 0-1 servings of fruits and vegetables daily.She consumes 0 sweetened beverage(s) daily.She exercises with enough effort to increase her heart rate 30 to 60 minutes per day.  She exercises with enough effort to increase her heart rate 4 days per week.   She is taking medications regularly.                     Objective    /56 (BP Location: Right " "arm, Patient Position: Sitting, Cuff Size: Adult Regular)   Pulse 80   Temp 98.6  F (37  C) (Oral)   Resp 18   Ht 1.664 m (5' 5.5\")   Wt 70.8 kg (156 lb)   LMP  (LMP Unknown)   SpO2 97%   Breastfeeding No   BMI 25.56 kg/m    Body mass index is 25.56 kg/m .  Physical Exam   Diffuse swelling involving the bilateral hands and all associated joints.  Perhaps mild erythematous changes involving the proximal joint structures            Signed Electronically by: Travon Pineda, CNP    "

## 2024-03-14 LAB
ANA PAT SER IF-IMP: ABNORMAL
ANA PAT SER IF-IMP: ABNORMAL
ANA SER QL IF: POSITIVE
ANA TITR SER IF: ABNORMAL {TITER}
ANA TITR SER IF: ABNORMAL {TITER}
CRP SERPL-MCNC: <3 MG/L
RHEUMATOID FACT SERPL-ACNC: <10 IU/ML

## 2024-03-18 LAB
CCP AB SER IA-ACNC: 1.3 U/ML
DSDNA AB SER-ACNC: 0.8 IU/ML

## 2024-03-19 ENCOUNTER — TRANSFERRED RECORDS (OUTPATIENT)
Dept: HEALTH INFORMATION MANAGEMENT | Facility: CLINIC | Age: 44
End: 2024-03-19
Payer: COMMERCIAL

## 2024-04-11 ENCOUNTER — TRANSFERRED RECORDS (OUTPATIENT)
Dept: HEALTH INFORMATION MANAGEMENT | Facility: CLINIC | Age: 44
End: 2024-04-11
Payer: COMMERCIAL

## 2024-05-02 ENCOUNTER — TRANSFERRED RECORDS (OUTPATIENT)
Dept: HEALTH INFORMATION MANAGEMENT | Facility: CLINIC | Age: 44
End: 2024-05-02
Payer: COMMERCIAL

## 2024-06-09 DIAGNOSIS — L70.0 ACNE VULGARIS: ICD-10-CM

## 2024-06-10 RX ORDER — SPIRONOLACTONE 50 MG/1
50 TABLET, FILM COATED ORAL DAILY
Qty: 30 TABLET | Refills: 2 | Status: SHIPPED | OUTPATIENT
Start: 2024-06-10 | End: 2024-08-28

## 2024-06-11 ENCOUNTER — LAB REQUISITION (OUTPATIENT)
Dept: LAB | Facility: CLINIC | Age: 44
End: 2024-06-11
Payer: COMMERCIAL

## 2024-06-11 DIAGNOSIS — R87.810 CERVICAL HIGH RISK HUMAN PAPILLOMAVIRUS (HPV) DNA TEST POSITIVE: ICD-10-CM

## 2024-06-11 PROCEDURE — 88342 IMHCHEM/IMCYTCHM 1ST ANTB: CPT | Mod: 26 | Performed by: PATHOLOGY

## 2024-06-11 PROCEDURE — G0145 SCR C/V CYTO,THINLAYER,RESCR: HCPCS | Mod: ORL | Performed by: OBSTETRICS & GYNECOLOGY

## 2024-06-11 PROCEDURE — 88305 TISSUE EXAM BY PATHOLOGIST: CPT | Mod: 26 | Performed by: PATHOLOGY

## 2024-06-11 PROCEDURE — 87624 HPV HI-RISK TYP POOLED RSLT: CPT | Mod: ORL | Performed by: OBSTETRICS & GYNECOLOGY

## 2024-06-11 PROCEDURE — 88305 TISSUE EXAM BY PATHOLOGIST: CPT | Mod: TC,ORL | Performed by: OBSTETRICS & GYNECOLOGY

## 2024-06-12 LAB
HPV HR 12 DNA CVX QL NAA+PROBE: POSITIVE
HPV16 DNA CVX QL NAA+PROBE: NEGATIVE
HPV18 DNA CVX QL NAA+PROBE: NEGATIVE
HUMAN PAPILLOMA VIRUS FINAL DIAGNOSIS: ABNORMAL

## 2024-06-17 LAB
BKR LAB AP GYN ADEQUACY: NORMAL
BKR LAB AP GYN INTERPRETATION: NORMAL
BKR LAB AP PREVIOUS ABNORMAL: NORMAL
PATH REPORT.COMMENTS IMP SPEC: NORMAL
PATH REPORT.COMMENTS IMP SPEC: NORMAL

## 2024-06-24 ENCOUNTER — MYC REFILL (OUTPATIENT)
Dept: FAMILY MEDICINE | Facility: CLINIC | Age: 44
End: 2024-06-24
Payer: COMMERCIAL

## 2024-06-24 DIAGNOSIS — Z76.89 ENCOUNTER FOR WEIGHT MANAGEMENT: ICD-10-CM

## 2024-06-24 RX ORDER — PHENTERMINE HYDROCHLORIDE 37.5 MG/1
37.5 CAPSULE ORAL EVERY MORNING
Qty: 90 CAPSULE | Refills: 0 | Status: SHIPPED | OUTPATIENT
Start: 2024-06-24

## 2024-07-18 ENCOUNTER — OFFICE VISIT (OUTPATIENT)
Dept: FAMILY MEDICINE | Facility: CLINIC | Age: 44
End: 2024-07-18
Payer: COMMERCIAL

## 2024-07-18 VITALS
BODY MASS INDEX: 25.5 KG/M2 | RESPIRATION RATE: 16 BRPM | TEMPERATURE: 99.8 F | SYSTOLIC BLOOD PRESSURE: 116 MMHG | WEIGHT: 155.6 LBS | HEART RATE: 110 BPM | OXYGEN SATURATION: 98 % | DIASTOLIC BLOOD PRESSURE: 73 MMHG

## 2024-07-18 DIAGNOSIS — R10.84 ABDOMINAL PAIN, GENERALIZED: ICD-10-CM

## 2024-07-18 DIAGNOSIS — R00.0 TACHYCARDIA: ICD-10-CM

## 2024-07-18 DIAGNOSIS — R50.9 FEVER, UNSPECIFIED FEVER CAUSE: Primary | ICD-10-CM

## 2024-07-18 PROCEDURE — 99213 OFFICE O/P EST LOW 20 MIN: CPT

## 2024-07-18 NOTE — PROGRESS NOTES
Assessment & Plan     Fever, unspecified fever cause  Abdominal pain, generalized  44-year-old previous healthy female presenting with 4 days of ill feeling, nausea, mild abdominal discomfort and fevers.  On exam today patient is afebrile, tachycardic see below, but otherwise overall well-appearing.  Discussed workup for abdominal pain with infectious etiology or other causes of abdominal pain.  Believe based on story and presentation likely viral etiology and recommended supportive cares and rehydration.  Given the tachycardia and duration however offered further investigation with CT and blood workup.  Using shared decision making we came to an agreement to hold off of that and watch and wait for another 2 to 3 days and if symptoms continue then she will return.    Tachycardia  110 today and regular.  Offered further workup as above.  Believe secondary to mild dehydration and recommended pushing fluids.  Discussed return precautions as above.  Patient in agreement with plan.    Return if symptoms worsen or fail to improve.    Eden Dias is a 44 year old, presenting for the following health issues:  Fever (Started Monday, ranging from 100.3 - 101.3, chills today, no headaches or dizziness, no coughing, some stomach pain when eating or drinking)    HPI   Acute Illness  Acute illness concerns: Fever  Onset/Duration: 4 days  Symptoms:  Fever: YES- 101.3  Chills/Sweats: YES  Headache (location?): YES  Sinus Pressure: No  Conjunctivitis:  No  Ear Pain: no  Rhinorrhea: No  Congestion: No  Sore Throat: No  Cough: no  Wheeze: No  Decreased Appetite: YES  Nausea: YES  Vomiting: No  Diarrhea: YES  Dysuria/Freq.: No  Dysuria or Hematuria: No  Fatigue/Achiness: YES  Sick/Strep Exposure: No  Therapies tried and outcome: Tylenol and ibuprofen.     Review of Systems  Constitutional, HEENT, cardiovascular, pulmonary, gi and gu systems are negative, except as otherwise noted.      Objective    /73   Pulse 110    Temp 99.8  F (37.7  C) (Oral)   Resp 16   Wt 70.6 kg (155 lb 9.6 oz)   SpO2 98%   BMI 25.50 kg/m    Body mass index is 25.5 kg/m .  Physical Exam   GENERAL: alert and no distress  NECK: no adenopathy, no asymmetry, masses, or scars  RESP: lungs clear to auscultation - no rales, rhonchi or wheezes  CV: Tachycardic with regular rhythm, normal S1 S2, no S3 or S4, no murmur, click or rub, no peripheral edema  ABDOMEN: soft, mild tenderness diffusely, no hepatosplenomegaly, no masses and bowel sounds normal  MS: no gross musculoskeletal defects noted, no edema    No results found for this or any previous visit (from the past 24 hour(s)).        Signed Electronically by: [facility]

## 2024-08-28 ENCOUNTER — MYC REFILL (OUTPATIENT)
Dept: FAMILY MEDICINE | Facility: CLINIC | Age: 44
End: 2024-08-28
Payer: COMMERCIAL

## 2024-08-28 DIAGNOSIS — L70.0 ACNE VULGARIS: ICD-10-CM

## 2024-08-28 RX ORDER — SPIRONOLACTONE 50 MG/1
50 TABLET, FILM COATED ORAL DAILY
Qty: 30 TABLET | Refills: 2 | Status: SHIPPED | OUTPATIENT
Start: 2024-08-28

## 2024-09-11 ENCOUNTER — OFFICE VISIT (OUTPATIENT)
Dept: FAMILY MEDICINE | Facility: CLINIC | Age: 44
End: 2024-09-11
Attending: FAMILY MEDICINE
Payer: COMMERCIAL

## 2024-09-11 VITALS
HEART RATE: 77 BPM | WEIGHT: 161 LBS | HEIGHT: 66 IN | TEMPERATURE: 98.8 F | BODY MASS INDEX: 25.88 KG/M2 | OXYGEN SATURATION: 100 % | SYSTOLIC BLOOD PRESSURE: 114 MMHG | DIASTOLIC BLOOD PRESSURE: 77 MMHG

## 2024-09-11 DIAGNOSIS — R76.8 ANA POSITIVE: ICD-10-CM

## 2024-09-11 DIAGNOSIS — R87.613 PAPANICOLAOU SMEAR OF CERVIX WITH HIGH GRADE SQUAMOUS INTRAEPITHELIAL LESION (HGSIL): ICD-10-CM

## 2024-09-11 DIAGNOSIS — F32.0 MILD MAJOR DEPRESSION (H): ICD-10-CM

## 2024-09-11 DIAGNOSIS — N83.202 LEFT OVARIAN CYST: ICD-10-CM

## 2024-09-11 DIAGNOSIS — D25.1 INTRAMURAL UTERINE FIBROID: ICD-10-CM

## 2024-09-11 DIAGNOSIS — M19.90 INFLAMMATORY ARTHRITIS: ICD-10-CM

## 2024-09-11 DIAGNOSIS — Z98.890 H/O LEEP: ICD-10-CM

## 2024-09-11 DIAGNOSIS — G43.709 CHRONIC MIGRAINE WITHOUT AURA WITHOUT STATUS MIGRAINOSUS, NOT INTRACTABLE: ICD-10-CM

## 2024-09-11 DIAGNOSIS — Z00.00 ROUTINE GENERAL MEDICAL EXAMINATION AT A HEALTH CARE FACILITY: Primary | ICD-10-CM

## 2024-09-11 DIAGNOSIS — M25.642 STIFFNESS OF HAND JOINT, LEFT: ICD-10-CM

## 2024-09-11 PROBLEM — N87.1 CERVICAL INTRAEPITHELIAL NEOPLASIA GRADE 2: Status: RESOLVED | Noted: 2023-09-07 | Resolved: 2024-09-11

## 2024-09-11 PROBLEM — R87.810 CERVICAL HIGH RISK HPV (HUMAN PAPILLOMAVIRUS) TEST POSITIVE: Status: RESOLVED | Noted: 2024-09-11 | Resolved: 2024-09-11

## 2024-09-11 PROBLEM — R87.810 CERVICAL HIGH RISK HPV (HUMAN PAPILLOMAVIRUS) TEST POSITIVE: Status: ACTIVE | Noted: 2024-09-11

## 2024-09-11 LAB
ERYTHROCYTE [DISTWIDTH] IN BLOOD BY AUTOMATED COUNT: 12.4 % (ref 10–15)
HCT VFR BLD AUTO: 41.8 % (ref 35–47)
HGB BLD-MCNC: 14.3 G/DL (ref 11.7–15.7)
MCH RBC QN AUTO: 31.9 PG (ref 26.5–33)
MCHC RBC AUTO-ENTMCNC: 34.2 G/DL (ref 31.5–36.5)
MCV RBC AUTO: 93 FL (ref 78–100)
PLATELET # BLD AUTO: 262 10E3/UL (ref 150–450)
RBC # BLD AUTO: 4.48 10E6/UL (ref 3.8–5.2)
WBC # BLD AUTO: 4.3 10E3/UL (ref 4–11)

## 2024-09-11 PROCEDURE — 85027 COMPLETE CBC AUTOMATED: CPT | Performed by: FAMILY MEDICINE

## 2024-09-11 PROCEDURE — 99396 PREV VISIT EST AGE 40-64: CPT | Mod: 25 | Performed by: FAMILY MEDICINE

## 2024-09-11 PROCEDURE — 86039 ANTINUCLEAR ANTIBODIES (ANA): CPT | Performed by: FAMILY MEDICINE

## 2024-09-11 PROCEDURE — 90656 IIV3 VACC NO PRSV 0.5 ML IM: CPT | Performed by: FAMILY MEDICINE

## 2024-09-11 PROCEDURE — 90472 IMMUNIZATION ADMIN EACH ADD: CPT | Performed by: FAMILY MEDICINE

## 2024-09-11 PROCEDURE — 90715 TDAP VACCINE 7 YRS/> IM: CPT | Performed by: FAMILY MEDICINE

## 2024-09-11 PROCEDURE — 86038 ANTINUCLEAR ANTIBODIES: CPT | Performed by: FAMILY MEDICINE

## 2024-09-11 PROCEDURE — 99213 OFFICE O/P EST LOW 20 MIN: CPT | Mod: 25 | Performed by: FAMILY MEDICINE

## 2024-09-11 PROCEDURE — 36415 COLL VENOUS BLD VENIPUNCTURE: CPT | Performed by: FAMILY MEDICINE

## 2024-09-11 PROCEDURE — 90471 IMMUNIZATION ADMIN: CPT | Performed by: FAMILY MEDICINE

## 2024-09-11 RX ORDER — HYDROXYCHLOROQUINE SULFATE 200 MG/1
1 TABLET, FILM COATED ORAL 2 TIMES DAILY
COMMUNITY
End: 2024-09-11

## 2024-09-11 ASSESSMENT — SOCIAL DETERMINANTS OF HEALTH (SDOH): HOW OFTEN DO YOU GET TOGETHER WITH FRIENDS OR RELATIVES?: ONCE A WEEK

## 2024-09-11 NOTE — PROGRESS NOTES
Preventive Care Visit  [facility] TERESA Almanza MD, Family Medicine  Sep 11, 2024      Assessment & Plan     Larissa was seen today for physical.    Diagnoses and all orders for this visit:    Routine general medical examination at a health care facility    Mild major depression (H24)  Comments:  working with therapist. currently no meds    Papanicolaou smear of cervix with high grade squamous intraepithelial lesion (HGSIL)  Comments:  going to [facility] recent pap, repeat pap every 6 month doing well    TUSHAR positive  Comments:  will repeat as lab could be false + as rest of the labs done by rheumatologsit at [facility] were neg including CRP/ESR, stopped her plaquenil last month lot of SE  Orders:  -     Anti Nuclear Roxy IgG by IFA with Reflex; Future  -     Anti Nuclear Roxy IgG by IFA with Reflex    H/O LEEP    Chronic migraine without aura without status migrainosus, not intractable  Comments:  Takes Imitrex as needed migraines are stable stop taking Topamax as scared that it might cause kidney stones    Left ovarian cyst    Intramural uterine fibroid    Inflammatory arthritis  -     CBC with platelets; Future  -     CBC with platelets    Stiffness of hand joint, left  Comments:  acute onset of swelling and hand pain both hand in march when she was visiting [place] ,seen at urgent care and then [facility]-took meds for 3 month, stopped now 1 m    Other orders  -     PRIMARY CARE FOLLOW-UP SCHEDULING; Future  -     TDAP 10-64Y (ADACEL,BOOSTRIX)  -     INFLUENZA VACCINE,SPLIT VIRUS,TRIVALENT,PF(FLUZONE)  -     COVID-19 12+ (2023-24) (PFIZER); Future  -     REVIEW OF HEALTH MAINTENANCE PROTOCOL ORDERS  -     PRIMARY CARE FOLLOW-UP SCHEDULING         Patient has been advised of split billing requirements and indicates understanding: Yes    Will Follow up on lab , recommend continue follow Derm, GYN and orthopedics as scheduled     BMI  Estimated body mass index is 26.38 kg/m  as calculated from the  "following:    Height as of this encounter: 1.664 m (5' 5.5\").    Weight as of this encounter: 73 kg (161 lb).   Weight management plan: continue phentermine as needed , stopped Topamax as Topamax as did not like the side effect that might cause kidney stone    Counseling  Appropriate preventive services were addressed with this patient via screening, questionnaire, or discussion as appropriate for fall prevention, nutrition, physical activity, Tobacco-use cessation, social engagement, weight loss and cognition.  Checklist reviewing preventive services available has been given to the patient.  Reviewed patient's diet, addressing concerns and/or questions.   She is at risk for lack of exercise and has been provided with information to increase physical activity for the benefit of her well-being.       MEDICATIONS:  Continue current medications without change    Eden Dias is a 44 year old, presenting for the following:  Physical (Annual px. Fasting. )        9/11/2024     9:26 AM   Additional Questions   Roomed by Pilar GARCIA MA        Health Care Directive  Patient does not have a Health Care Directive or Living Will: Discussed advance care planning with patient; however, patient declined at this time.    HPI BMI 26  Wt Readings from Last 4 Encounters:   09/11/24 73 kg (161 lb)   07/18/24 70.6 kg (155 lb 9.6 oz)   03/13/24 70.8 kg (156 lb)   09/07/23 70.8 kg (156 lb)          9/11/2024   General Health   How would you rate your overall physical health? Good   Feel stress (tense, anxious, or unable to sleep) To some extent      (!) STRESS CONCERN      9/11/2024   Nutrition   Three or more servings of calcium each day? Yes   Diet: Regular (no restrictions)   How many servings of fruit and vegetables per day? (!) 0-1   How many sweetened beverages each day? 0-1            9/11/2024   Exercise   Days per week of moderate/strenous exercise 3 days            9/11/2024   Social Factors   Frequency of gathering with " friends or relatives Once a week   Worry food won't last until get money to buy more No   Food not last or not have enough money for food? No   Do you have housing? (Housing is defined as stable permanent housing and does not include staying ouside in a car, in a tent, in an abandoned building, in an overnight shelter, or couch-surfing.) Yes   Are you worried about losing your housing? No   Lack of transportation? No   Unable to get utilities (heat,electricity)? No            9/11/2024   Dental   Dentist two times every year? Yes            9/11/2024   TB Screening   Were you born outside of the [place]? No            Today's PHQ-9 Score:       3/13/2024    11:47 AM   PHQ-9 SCORE   PHQ-9 Total Score MyChart 0   PHQ-9 Total Score 0           9/11/2024   Substance Use   Alcohol more than 3/day or more than 7/wk No   Do you use any other substances recreationally? No        Social History     Tobacco Use    Smoking status: Former     Passive exposure: Past    Smokeless tobacco: Never   Vaping Use    Vaping status: Never Used   Substance Use Topics    Alcohol use: Yes     Alcohol/week: 1.0 standard drink of alcohol     Comment: Special occasions only    Drug use: No           11/17/2023   LAST FHS-7 RESULTS   Any relative bilateral breast cancer No   Any male have breast cancer No   Any ONE woman have BOTH breast AND ovarian cancer No   Any woman with breast cancer before 50yrs Unknown   2 or more relatives with breast AND/OR ovarian cancer No   2 or more relatives with breast AND/OR bowel cancer No           Mammogram Screening - Mammogram every 1-2 years updated in Health Maintenance based on mutual decision making        9/11/2024   STI Screening   New sexual partner(s) since last STI/HIV test? No        History of abnormal Pap smear: YES - reflected in Problem List and Health Maintenance accordingly        Latest Ref Rng & Units 6/11/2024    11:20 AM 12/12/2023    10:40 AM 9/14/2023    11:56 AM   PAP / HPV   PAP   "Negative for Intraepithelial Lesion or Malignancy (NILM)  Atypical squamous cells of undetermined significance (ASC-US)  Atypical squamous cells of undetermined significance (ASC-US)    HPV 16 DNA Negative Negative  Negative  Negative    HPV 18 DNA Negative Negative  Positive  Negative    Other HR HPV Negative Positive  Positive  Positive      ASCVD Risk   The 10-year ASCVD risk score (Ryan MENDES, et al., 2019) is: 0.5%    Values used to calculate the score:      Age: 44 years      Sex: Female      Is Non- : No      Diabetic: No      Tobacco smoker: No      Systolic Blood Pressure: 114 mmHg      Is BP treated: No      HDL Cholesterol: 53 mg/dL      Total Cholesterol: 180 mg/dL        2024   Contraception/Family Planning   Questions about contraception or family planning No           Reviewed and updated as needed this visit by Provider   Tobacco  Allergies  Meds  Problems  Med Hx  Surg Hx  Fam Hx            OB History    Para Term  AB Living   3 3 3 0 0 1   SAB IAB Ectopic Multiple Live Births   0 0 0 0 1      # Outcome Date GA Lbr Eb/2nd Weight Sex Type Anes PTL Lv   3 Term 14 39w2d 10:35 / 00:12 3.204 kg (7 lb 1 oz) F Vag-Spont None N ROSSANA      Name: AMY,FEMALE-LETITIA      Apgar1: 9  Apgar5: 9   2 Term            1 Term                  Review of Systems  Constitutional, neuro, ENT, endocrine, pulmonary, cardiac, gastrointestinal, genitourinary,  integument and psychiatric systems are negative, except as otherwise noted.     Objective    Exam  /77 (BP Location: Left arm, Patient Position: Sitting, Cuff Size: Adult Regular)   Pulse 77   Temp 98.8  F (37.1  C) (Oral)   Ht 1.664 m (5' 5.5\")   Wt 73 kg (161 lb)   SpO2 100%   BMI 26.38 kg/m     Estimated body mass index is 26.38 kg/m  as calculated from the following:    Height as of this encounter: 1.664 m (5' 5.5\").    Weight as of this encounter: 73 kg (161 lb).    Physical Exam  GENERAL: " alert and no distress  NECK: no adenopathy, no asymmetry, masses, or scars  RESP: lungs clear to auscultation - no rales, rhonchi or wheezes  CV: regular rate and rhythm, normal S1 S2, no S3 or S4, no murmur, click or rub, no peripheral edema  ABDOMEN: soft, nontender, no hepatosplenomegaly, no masses and bowel sounds normal  MS: no gross musculoskeletal defects noted, no edema  No hand swelling today, good  strength        Signed Electronically by: Orly Almanza MD

## 2024-09-11 NOTE — PATIENT INSTRUCTIONS
Patient Education   Preventive Care Advice   This is general advice given by our system to help you stay healthy. However, your care team may have specific advice just for you. Please talk to your care team about your preventive care needs.  Nutrition  Eat 5 or more servings of fruits and vegetables each day.  Try wheat bread, brown rice and whole grain pasta (instead of white bread, rice, and pasta).  Get enough calcium and vitamin D. Check the label on foods and aim for 100% of the RDA (recommended daily allowance).  Lifestyle  Exercise at least 150 minutes each week  (30 minutes a day, 5 days a week).  Do muscle strengthening activities 2 days a week. These help control your weight and prevent disease.  No smoking.  Wear sunscreen to prevent skin cancer.  Have a dental exam and cleaning every 6 months.  Yearly exams  See your health care team every year to talk about:  Any changes in your health.  Any medicines your care team has prescribed.  Preventive care, family planning, and ways to prevent chronic diseases.  Shots (vaccines)   HPV shots (up to age 26), if you've never had them before.  Hepatitis B shots (up to age 59), if you've never had them before.  COVID-19 shot: Get this shot when it's due.  Flu shot: Get a flu shot every year.  Tetanus shot: Get a tetanus shot every 10 years.  Pneumococcal, hepatitis A, and RSV shots: Ask your care team if you need these based on your risk.  Shingles shot (for age 50 and up)  General health tests  Diabetes screening:  Starting at age 35, Get screened for diabetes at least every 3 years.  If you are younger than age 35, ask your care team if you should be screened for diabetes.  Cholesterol test: At age 39, start having a cholesterol test every 5 years, or more often if advised.  Bone density scan (DEXA): At age 50, ask your care team if you should have this scan for osteoporosis (brittle bones).  Hepatitis C: Get tested at least once in your life.  STIs (sexually  transmitted infections)  Before age 24: Ask your care team if you should be screened for STIs.  After age 24: Get screened for STIs if you're at risk. You are at risk for STIs (including HIV) if:  You are sexually active with more than one person.  You don't use condoms every time.  You or a partner was diagnosed with a sexually transmitted infection.  If you are at risk for HIV, ask about PrEP medicine to prevent HIV.  Get tested for HIV at least once in your life, whether you are at risk for HIV or not.  Cancer screening tests  Cervical cancer screening: If you have a cervix, begin getting regular cervical cancer screening tests starting at age 21.  Breast cancer scan (mammogram): If you've ever had breasts, begin having regular mammograms starting at age 40. This is a scan to check for breast cancer.  Colon cancer screening: It is important to start screening for colon cancer at age 45.  Have a colonoscopy test every 10 years (or more often if you're at risk) Or, ask your provider about stool tests like a FIT test every year or Cologuard test every 3 years.  To learn more about your testing options, visit:   .  For help making a decision, visit:   https://bit.ly/xw86686.  Prostate cancer screening test: If you have a prostate, ask your care team if a prostate cancer screening test (PSA) at age 55 is right for you.  Lung cancer screening: If you are a current or former smoker ages 50 to 80, ask your care team if ongoing lung cancer screenings are right for you.  For informational purposes only. Not to replace the advice of your health care provider. Copyright   2023 [employer]. All rights reserved. Clinically reviewed by the [facility]itions Program. [employer] [id number] - REV 01/24.

## 2024-09-13 LAB
ANA PAT SER IF-IMP: ABNORMAL
ANA PAT SER IF-IMP: ABNORMAL
ANA SER QL IF: ABNORMAL
ANA TITR SER IF: ABNORMAL {TITER}
ANA TITR SER IF: ABNORMAL {TITER}

## 2024-11-13 DIAGNOSIS — L70.0 ACNE VULGARIS: ICD-10-CM

## 2024-11-13 RX ORDER — SPIRONOLACTONE 50 MG/1
50 TABLET, FILM COATED ORAL DAILY
Qty: 90 TABLET | Refills: 2 | Status: SHIPPED | OUTPATIENT
Start: 2024-11-13

## 2024-11-18 ENCOUNTER — HOSPITAL ENCOUNTER (OUTPATIENT)
Dept: MAMMOGRAPHY | Facility: CLINIC | Age: 44
Discharge: HOME | End: 2024-11-18
Attending: FAMILY MEDICINE | Admitting: FAMILY MEDICINE
Payer: COMMERCIAL

## 2024-11-18 DIAGNOSIS — Z12.31 VISIT FOR SCREENING MAMMOGRAM: ICD-10-CM

## 2024-11-18 PROCEDURE — 77063 BREAST TOMOSYNTHESIS BI: CPT

## 2024-11-19 ENCOUNTER — MYC REFILL (OUTPATIENT)
Dept: FAMILY MEDICINE | Facility: CLINIC | Age: 44
End: 2024-11-19
Payer: COMMERCIAL

## 2024-11-19 DIAGNOSIS — Z76.89 ENCOUNTER FOR WEIGHT MANAGEMENT: ICD-10-CM

## 2024-11-19 RX ORDER — PHENTERMINE HYDROCHLORIDE 37.5 MG/1
37.5 CAPSULE ORAL EVERY MORNING
Qty: 90 CAPSULE | Refills: 0 | Status: SHIPPED | OUTPATIENT
Start: 2024-11-19

## 2025-03-04 LAB — PAP SMEAR - HIM PATIENT REPORTED: NEGATIVE

## 2025-04-01 ENCOUNTER — TRANSFERRED RECORDS (OUTPATIENT)
Dept: HEALTH INFORMATION MANAGEMENT | Facility: CLINIC | Age: 45
End: 2025-04-01
Payer: COMMERCIAL

## 2025-06-03 ENCOUNTER — OFFICE VISIT (OUTPATIENT)
Dept: FAMILY MEDICINE | Facility: CLINIC | Age: 45
End: 2025-06-03
Payer: COMMERCIAL

## 2025-06-03 VITALS
HEIGHT: 66 IN | SYSTOLIC BLOOD PRESSURE: 100 MMHG | OXYGEN SATURATION: 100 % | HEART RATE: 80 BPM | BODY MASS INDEX: 26.07 KG/M2 | RESPIRATION RATE: 12 BRPM | DIASTOLIC BLOOD PRESSURE: 70 MMHG | TEMPERATURE: 97.8 F | WEIGHT: 162.2 LBS

## 2025-06-03 DIAGNOSIS — Z98.890 HISTORY OF BACK SURGERY: ICD-10-CM

## 2025-06-03 DIAGNOSIS — G43.709 CHRONIC MIGRAINE WITHOUT AURA WITHOUT STATUS MIGRAINOSUS, NOT INTRACTABLE: ICD-10-CM

## 2025-06-03 DIAGNOSIS — R87.613 PAPANICOLAOU SMEAR OF CERVIX WITH HIGH GRADE SQUAMOUS INTRAEPITHELIAL LESION (HGSIL): ICD-10-CM

## 2025-06-03 DIAGNOSIS — Z97.5 IUD (INTRAUTERINE DEVICE) IN PLACE: ICD-10-CM

## 2025-06-03 DIAGNOSIS — Z01.818 PREOP EXAMINATION: Primary | ICD-10-CM

## 2025-06-03 DIAGNOSIS — E28.2 PCOS (POLYCYSTIC OVARIAN SYNDROME): ICD-10-CM

## 2025-06-03 DIAGNOSIS — E66.3 OVERWEIGHT (BMI 25.0-29.9): ICD-10-CM

## 2025-06-03 DIAGNOSIS — D25.1 INTRAMURAL UTERINE FIBROID: ICD-10-CM

## 2025-06-03 PROBLEM — N80.9 ENDOMETRIOSIS: Status: RESOLVED | Noted: 2022-09-19 | Resolved: 2025-06-03

## 2025-06-03 LAB
ALBUMIN SERPL BCG-MCNC: 4.2 G/DL (ref 3.5–5.2)
ALP SERPL-CCNC: 42 U/L (ref 40–150)
ALT SERPL W P-5'-P-CCNC: 34 U/L (ref 0–50)
ANION GAP SERPL CALCULATED.3IONS-SCNC: 9 MMOL/L (ref 7–15)
AST SERPL W P-5'-P-CCNC: 34 U/L (ref 0–45)
BILIRUB SERPL-MCNC: 0.2 MG/DL
BUN SERPL-MCNC: 9.5 MG/DL (ref 6–20)
CALCIUM SERPL-MCNC: 9.5 MG/DL (ref 8.8–10.4)
CHLORIDE SERPL-SCNC: 105 MMOL/L (ref 98–107)
CREAT SERPL-MCNC: 0.88 MG/DL (ref 0.51–0.95)
EGFRCR SERPLBLD CKD-EPI 2021: 82 ML/MIN/1.73M2
ERYTHROCYTE [DISTWIDTH] IN BLOOD BY AUTOMATED COUNT: 12.7 % (ref 10–15)
GLUCOSE SERPL-MCNC: 92 MG/DL (ref 70–99)
HCO3 SERPL-SCNC: 24 MMOL/L (ref 22–29)
HCT VFR BLD AUTO: 37.9 % (ref 35–47)
HGB BLD-MCNC: 12.7 G/DL (ref 11.7–15.7)
MCH RBC QN AUTO: 32.9 PG (ref 26.5–33)
MCHC RBC AUTO-ENTMCNC: 33.5 G/DL (ref 31.5–36.5)
MCV RBC AUTO: 98 FL (ref 78–100)
PLATELET # BLD AUTO: 274 10E3/UL (ref 150–450)
POTASSIUM SERPL-SCNC: 4.5 MMOL/L (ref 3.4–5.3)
PROT SERPL-MCNC: 6.7 G/DL (ref 6.4–8.3)
RBC # BLD AUTO: 3.86 10E6/UL (ref 3.8–5.2)
SODIUM SERPL-SCNC: 138 MMOL/L (ref 135–145)
WBC # BLD AUTO: 4.3 10E3/UL (ref 4–11)

## 2025-06-03 PROCEDURE — G2211 COMPLEX E/M VISIT ADD ON: HCPCS | Performed by: FAMILY MEDICINE

## 2025-06-03 PROCEDURE — 80053 COMPREHEN METABOLIC PANEL: CPT | Performed by: FAMILY MEDICINE

## 2025-06-03 PROCEDURE — 3074F SYST BP LT 130 MM HG: CPT | Performed by: FAMILY MEDICINE

## 2025-06-03 PROCEDURE — 36415 COLL VENOUS BLD VENIPUNCTURE: CPT | Performed by: FAMILY MEDICINE

## 2025-06-03 PROCEDURE — 3078F DIAST BP <80 MM HG: CPT | Performed by: FAMILY MEDICINE

## 2025-06-03 PROCEDURE — 99214 OFFICE O/P EST MOD 30 MIN: CPT | Performed by: FAMILY MEDICINE

## 2025-06-03 PROCEDURE — 85027 COMPLETE CBC AUTOMATED: CPT | Performed by: FAMILY MEDICINE

## 2025-06-03 RX ORDER — SPIRONOLACTONE 25 MG/1
1 TABLET ORAL
COMMUNITY
Start: 2025-05-06

## 2025-06-03 ASSESSMENT — PATIENT HEALTH QUESTIONNAIRE - PHQ9
SUM OF ALL RESPONSES TO PHQ QUESTIONS 1-9: 10
10. IF YOU CHECKED OFF ANY PROBLEMS, HOW DIFFICULT HAVE THESE PROBLEMS MADE IT FOR YOU TO DO YOUR WORK, TAKE CARE OF THINGS AT HOME, OR GET ALONG WITH OTHER PEOPLE: NOT DIFFICULT AT ALL

## 2025-06-03 NOTE — PROGRESS NOTES
Preoperative Evaluation  [facility]  [address] [address]  Phone: [phone]  Fax: [phone]  Primary Provider: Orly Almanza MD  Pre-op Performing Provider: Orly Almanza MD  William 3, 2025             6/3/2025   Surgical Information   What procedure is being done? hysterectomy   Facility or Hospital where procedure/surgery will be performed: [facility]   Who is doing the procedure / surgery? [facility]   Date of surgery / procedure: june 18   Time of surgery / procedure: 9   Where do you plan to recover after surgery? at home with family     Fax number for surgical facility: Note does not need to be faxed, will be available electronically in Epic.    Assessment & Plan     The proposed surgical procedure is considered LOW risk.    Preop examination:  - Preoperative examination for upcoming surgery. No anesthesia issues in past surgeries.  - Conduct normal pre-op labs including CBC and CMP.    Intramural uterine fibroid:  - Causing problems, contributing to decision for surgery.  - Surgical removal of the uterus.    Overweight (BMI 25.0-29.9):  - Weight management with phentermine noted.  - Avoid phentermine at least one week before surgery.    PCOS (polycystic ovarian syndrome):  - Managed with spironolactone, effective for acne and hair growth.  - Continue spironolactone 25 mg.    Consent was obtained from the patient to use an AI documentation tool in the creation of this note.    The longitudinal plan of care for the diagnosis(es)/condition(s) as documented were addressed during this visit. Due to the added complexity in care, I will continue to support Larissa in the subsequent management and with ongoing continuity of care.        - No identified additional risk factors other than previously addressed         Recommendation  Approval given to proceed with proposed procedure, without further diagnostic evaluation.    Follow-up       Subjective   Larissa is a  45 year old, presenting for the following:  Pre-Op Exam (Hysterectomy @ 's 6/18/25)          6/3/2025    10:03 AM   Additional Questions   Roomed by Pilar GARCIA MA     HPI:   Wt Readings from Last 4 Encounters:   06/03/25 73.6 kg (162 lb 3.2 oz)   09/11/24 73 kg (161 lb)   07/18/24 70.6 kg (155 lb 9.6 oz)   03/13/24 70.8 kg (156 lb)     Larissa Elliott, 45-year-old female    - Experiencing problems due to fibroids and a history of abnormal Pap smears.  - No anesthesia issues during past back surgeries.  - Last migraine headache occurred about a month ago, typically triggered by extreme temperature fluctuations; managed with sumatriptan or Excedrin migraine depending on early intervention.  - Undergoing weight management with phentermine, resulting in weight loss from 189 lbs to 162 lbs.  - Previously used Topamax but discontinued due to concerns about kidney stones.  - No history of sleep apnea or snoring complaints.  - Manages stress eating with phentermine, taken every other day.  - Currently going through a divorce, which is not yet finalized.        6/3/2025   Pre-Op Questionnaire   Have you ever had a heart attack or stroke? No   Have you ever had surgery on your heart or blood vessels, such as a stent placement, a coronary artery bypass, or surgery on an artery in your head, neck, heart, or legs? No   Do you have chest pain with activity? No   Do you have a history of heart failure? No   Do you currently have a cold, bronchitis or symptoms of other infection? No   Do you have a cough, shortness of breath, or wheezing? No   Do you or anyone in your family have previous history of blood clots? No   Do you or does anyone in your family have a serious bleeding problem such as prolonged bleeding following surgeries or cuts? No   Have you ever had problems with anemia or been told to take iron pills? No   Have you had any abnormal blood loss such as black, tarry or bloody stools, or abnormal vaginal bleeding? No    Have you ever had a blood transfusion? No   Are you willing to have a blood transfusion if it is medically needed before, during, or after your surgery? Yes   Have you or any of your relatives ever had problems with anesthesia? No   Do you have sleep apnea, excessive snoring or daytime drowsiness? No   Do you have any artifical heart valves or other implanted medical devices like a pacemaker, defibrillator, or continuous glucose monitor? No   Do you have artificial joints? No   Are you allergic to latex? (!) YES     Advance Care Planning    Health Care Directive received at today's visit.  Forwarded to [facility].    Preoperative Review of    reviewed - no record of controlled substances prescribed.      Status of Chronic Conditions:  See problem list for active medical problems.  Problems all longstanding and stable, except as noted/documented.  See ROS for pertinent symptoms related to these conditions.    Patient Active Problem List    Diagnosis Date Noted    Papanicolaou smear of cervix with high grade squamous intraepithelial lesion (HGSIL) 07/14/2022     Priority: High     Pt reported hx of precancerous cell and +  HPV  9/10/15 NIL pap, Neg HPV 2019 NIL pap  2021 HSIL pap, + HR HPV  2021 Yolyn: neg. With Dr [facility]  7/14/22 HSIL, +HR HPV, not 16/18. Plan Yolyn bef 10/14/22  08/17/22 Yolyn Bx HARITHA III ECC No HARITHA. Plan LEEP due bef 11/17/22.  Provider released the results and recommendations to the pt through [facility], pt viewed.   09/26/22 LEEP HARITHA 2, margins negative Plan cotest in 6 months per guidelines due 03/26/23 03/09/23 Reminder [facility], Letter   03/15/23 Yolyn ECC No HARITHA NIL Pap, +HR HPV (not 16 or 18) Plan per provider cotest in 6 months due 09/15/23 Done at [facility]   04/10/23 called Maura at [facility] to request Yolyn path note and recommendations, notes now abstracted   09/14/23 ASCUS Pap, +HR HPV (not 16 or 18) ECC No HARITHA  Plan provider review (done at  [facility])   [facility] to fax over records.   10/26/23 still awaiting records   11/30/23 still awaiting records   12/12/23 [place] ECC No HARITHA ASCUS Pap, +HR HPV types 18 and other Plan cotest in 1 year due 12/12/24 per guidelines Done at [facility] / End tracking as pt had follow up [facility]      Intramural uterine fibroid 09/11/2024     Priority: Medium    Left ovarian cyst 09/11/2024     Priority: Medium    Stiffness of hand joint, left 09/11/2024     Priority: Medium    IUD (intrauterine device) in place 11/02/2021     Priority: Medium    Mild major depression 05/27/2021     Priority: Medium    Carpal tunnel syndrome of left wrist 01/29/2021     Priority: Medium     Formatting of this note might be different from the original.  Added automatically from request for surgery [id number]      Chronic migraine 11/25/2020     Priority: Medium     Replacing diagnoses that were inactivated after the 10/1/2021 regulatory import.      Foraminal stenosis of lumbar region 11/25/2020     Priority: Medium    PCOS (polycystic ovarian syndrome) 07/19/2019     Priority: Medium    Symptomatic varicose veins, bilateral 09/15/2015     Priority: Medium      Past Medical History:   Diagnosis Date    Abnormal Pap smear of cervix     Was told Precancerous cells & HPV    History of anesthesia complications     slow wake up    Migraines     Moderate major depression (H) 02/19/2021    PCOS (polycystic ovarian syndrome)      Past Surgical History:   Procedure Laterality Date    BACK SURGERY Left 2003    L5 S1    CONIZATION LEEP N/A 9/26/2022    Procedure: LOOP ELECTROSURGICAL EXCISION PROCEDURE (LEEP), REMOVAL AND INSERTION OF MIRENA INTRAUTERINE DEVICE;  Surgeon: Uyen Forbes MD;  Location: [facility] OR    ENDOMETRIAL ABLATION      GYNECOLOGIC CRYOSURGERY      HC REVISE MEDIAN N/CARPAL TUNNEL SURG Left 2/26/2021    Procedure: LEFT CARPAL TUNNEL RELEASE;  Surgeon:  Anna Hameed MD;  Location: [facility] Main OR;  Service: Neurosurgery    LAPAROTOMY EXPLORATORY      LUMBAR DISCECTOMY Left 1/30/2015    Procedure: LEFT LUMBAR 4-5 MICRODISCECTOMY AND REVISION LEFT LUMBAR 5-SACRAL 1 MICRODISCECTOMY;  Surgeon: Mick Bolton MD;  Location: [facility] Main OR;  Service:     LUMBAR DISCECTOMY Left 1/30/2015    Procedure: LEFT L4-5 MICRODISCECTOMY AND REVISION LEFT L5-S1 MICRODISCECTOMY;  Surgeon: Mick Bolton MD;  Location: [facility] Main OR;  Service:     OTHER SURGICAL HISTORY Bilateral 2003    back surgeryDisc ectomy L5/S1    CO LAMNOTMY INCL W/DCMPRSN NRV ROOT 1 INTRSPC LUMBR Right 3/5/2020    Procedure: MICRODISCECTOMY RIGHT LUMBAR 4-5 LATERAL RECESS DECOMPRESSION RIGHT LUMBAR 4-5;  Surgeon: Mick Bolton MD;  Location: [facility] Main OR;  Service: Spine    CO LAMNOTMY INCL W/DCMPRSN NRV ROOT 1 INTRSPC LUMBR Right 6/3/2021    Procedure: RIGHT LUMBAR 4-5 REVISION MICRODISCECTOMY;  Surgeon: Mick Bolton MD;  Location: [facility] OR;  Service: Spine    SPINE SURGERY       Current Outpatient Medications   Medication Sig Dispense Refill    levonorgestrel (MIRENA) 20 MCG/DAY IUD 1 each by Intrauterine route once Placed in 2020      Multiple Vitamins-Minerals (MULTIVITAMIN ADULT PO)       phentermine (ADIPEX-P) 37.5 MG capsule Take 1 capsule (37.5 mg) by mouth every morning. 90 capsule 0    spironolactone (ALDACTONE) 25 MG tablet Take 1 tablet by mouth daily at 2 pm.      SUMAtriptan (IMITREX) 50 MG tablet Take 1 tablet (50 mg) by mouth at onset of headache for migraine May repeat in 2 hours. Max 2 tablets/24 hours. 18 tablet 11       Allergies   Allergen Reactions    Adhesive Tape Rash    Latex Rash        Social History     Tobacco Use    Smoking status: Former     Passive exposure: Past    Smokeless tobacco: Never   Substance Use Topics    Alcohol use: Yes     Alcohol/week: 1.0 standard drink of alcohol     Comment:  "Special occasions only     Family History   Problem Relation Age of Onset    Migraines Father     Migraines Brother     Seizure Disorder Brother     Asthma Father     Sarcoidosis Father     Asthma Brother     Breast Cancer Maternal Grandmother         Premenopausal    Chronic Obstructive Pulmonary Disease Paternal Grandfather     Psoriasis Mother      History   Drug Use No             Review of Systems  Constitutional, HEENT, cardiovascular, pulmonary, GI, , musculoskeletal, neuro, skin, endocrine and psych systems are negative, except as otherwise noted.    Objective    /70   Pulse 80   Temp 97.8  F (36.6  C) (Temporal)   Resp 12   Ht 1.664 m (5' 5.5\")   Wt 73.6 kg (162 lb 3.2 oz)   SpO2 100%   BMI 26.58 kg/m     Estimated body mass index is 26.58 kg/m  as calculated from the following:    Height as of this encounter: 1.664 m (5' 5.5\").    Weight as of this encounter: 73.6 kg (162 lb 3.2 oz).  Physical Exam  GENERAL: alert and no distress  EYES: Eyes grossly normal to inspection, PERRL and conjunctivae and sclerae normal  HENT: ear canals and TM's normal, nose and mouth without ulcers or lesions  NECK: no adenopathy, no asymmetry, masses, or scars  RESP: lungs clear to auscultation - no rales, rhonchi or wheezes  CV: regular rate and rhythm, normal S1 S2, no S3 or S4, no murmur, click or rub, no peripheral edema  MS: no gross musculoskeletal defects noted, no edema    Recent Labs   Lab Test 09/11/24  1005   HGB 14.3           Diagnostics  Labs pending at this time.  Results will be reviewed when available.   No EKG required, no history of coronary heart disease, significant arrhythmia, peripheral arterial disease or other structural heart disease.    Revised Cardiac Risk Index (RCRI)  The patient has the following serious cardiovascular risks for perioperative complications:   - No serious cardiac risks = 0 points     RCRI Interpretation: 0 points: Class I (very low risk - 0.4% complication " rate)         Signed Electronically by: Orly Almanza MD  A copy of this evaluation report is provided to the requesting physician.

## 2025-06-04 ENCOUNTER — RESULTS FOLLOW-UP (OUTPATIENT)
Dept: FAMILY MEDICINE | Facility: CLINIC | Age: 45
End: 2025-06-04

## 2025-06-05 ASSESSMENT — PATIENT HEALTH QUESTIONNAIRE - PHQ9: SUM OF ALL RESPONSES TO PHQ QUESTIONS 1-9: 10

## 2025-06-18 ENCOUNTER — HOSPITAL ENCOUNTER (OUTPATIENT)
Facility: HOSPITAL | Age: 45
Discharge: HOME | End: 2025-06-18
Attending: STUDENT IN AN ORGANIZED HEALTH CARE EDUCATION/TRAINING PROGRAM | Admitting: STUDENT IN AN ORGANIZED HEALTH CARE EDUCATION/TRAINING PROGRAM
Payer: COMMERCIAL

## 2025-06-18 ENCOUNTER — ANESTHESIA EVENT (OUTPATIENT)
Dept: SURGERY | Facility: HOSPITAL | Age: 45
End: 2025-06-18
Payer: COMMERCIAL

## 2025-06-18 ENCOUNTER — ANESTHESIA (OUTPATIENT)
Dept: SURGERY | Facility: HOSPITAL | Age: 45
End: 2025-06-18
Payer: COMMERCIAL

## 2025-06-18 VITALS
WEIGHT: 161.2 LBS | DIASTOLIC BLOOD PRESSURE: 65 MMHG | BODY MASS INDEX: 26.42 KG/M2 | RESPIRATION RATE: 16 BRPM | HEART RATE: 61 BPM | SYSTOLIC BLOOD PRESSURE: 101 MMHG | OXYGEN SATURATION: 98 % | TEMPERATURE: 97.2 F

## 2025-06-18 DIAGNOSIS — Z97.5 IUD (INTRAUTERINE DEVICE) IN PLACE: Primary | ICD-10-CM

## 2025-06-18 DIAGNOSIS — D25.9 UTERINE LEIOMYOMA, UNSPECIFIED LOCATION: ICD-10-CM

## 2025-06-18 LAB — HCG UR QL: NEGATIVE

## 2025-06-18 PROCEDURE — 258N000003 HC RX IP 258 OP 636: Performed by: NURSE ANESTHETIST, CERTIFIED REGISTERED

## 2025-06-18 PROCEDURE — 250N000011 HC RX IP 250 OP 636: Performed by: NURSE ANESTHETIST, CERTIFIED REGISTERED

## 2025-06-18 PROCEDURE — 250N000011 HC RX IP 250 OP 636: Performed by: STUDENT IN AN ORGANIZED HEALTH CARE EDUCATION/TRAINING PROGRAM

## 2025-06-18 PROCEDURE — 360N000080 HC SURGERY LEVEL 7, PER MIN: Performed by: STUDENT IN AN ORGANIZED HEALTH CARE EDUCATION/TRAINING PROGRAM

## 2025-06-18 PROCEDURE — 710N000012 HC RECOVERY PHASE 2, PER MINUTE: Performed by: STUDENT IN AN ORGANIZED HEALTH CARE EDUCATION/TRAINING PROGRAM

## 2025-06-18 PROCEDURE — 999N000141 HC STATISTIC PRE-PROCEDURE NURSING ASSESSMENT: Performed by: STUDENT IN AN ORGANIZED HEALTH CARE EDUCATION/TRAINING PROGRAM

## 2025-06-18 PROCEDURE — 258N000001 HC RX 258: Performed by: STUDENT IN AN ORGANIZED HEALTH CARE EDUCATION/TRAINING PROGRAM

## 2025-06-18 PROCEDURE — 250N000013 HC RX MED GY IP 250 OP 250 PS 637: Performed by: STUDENT IN AN ORGANIZED HEALTH CARE EDUCATION/TRAINING PROGRAM

## 2025-06-18 PROCEDURE — 81025 URINE PREGNANCY TEST: CPT | Performed by: STUDENT IN AN ORGANIZED HEALTH CARE EDUCATION/TRAINING PROGRAM

## 2025-06-18 PROCEDURE — 250N000011 HC RX IP 250 OP 636: Performed by: ANESTHESIOLOGY

## 2025-06-18 PROCEDURE — 710N000009 HC RECOVERY PHASE 1, LEVEL 1, PER MIN: Performed by: STUDENT IN AN ORGANIZED HEALTH CARE EDUCATION/TRAINING PROGRAM

## 2025-06-18 PROCEDURE — 250N000009 HC RX 250: Performed by: NURSE ANESTHETIST, CERTIFIED REGISTERED

## 2025-06-18 PROCEDURE — 88342 IMHCHEM/IMCYTCHM 1ST ANTB: CPT | Mod: TC | Performed by: STUDENT IN AN ORGANIZED HEALTH CARE EDUCATION/TRAINING PROGRAM

## 2025-06-18 PROCEDURE — 370N000017 HC ANESTHESIA TECHNICAL FEE, PER MIN: Performed by: STUDENT IN AN ORGANIZED HEALTH CARE EDUCATION/TRAINING PROGRAM

## 2025-06-18 PROCEDURE — 258N000003 HC RX IP 258 OP 636: Performed by: ANESTHESIOLOGY

## 2025-06-18 PROCEDURE — 250N000009 HC RX 250: Performed by: ANESTHESIOLOGY

## 2025-06-18 PROCEDURE — 272N000001 HC OR GENERAL SUPPLY STERILE: Performed by: STUDENT IN AN ORGANIZED HEALTH CARE EDUCATION/TRAINING PROGRAM

## 2025-06-18 RX ORDER — HYDROMORPHONE HCL IN WATER/PF 6 MG/30 ML
0.4 PATIENT CONTROLLED ANALGESIA SYRINGE INTRAVENOUS EVERY 5 MIN PRN
Status: DISCONTINUED | OUTPATIENT
Start: 2025-06-18 | End: 2025-06-18 | Stop reason: HOSPADM

## 2025-06-18 RX ORDER — NALOXONE HYDROCHLORIDE 1 MG/ML
0.2 INJECTION INTRAMUSCULAR; INTRAVENOUS; SUBCUTANEOUS
Status: DISCONTINUED | OUTPATIENT
Start: 2025-06-18 | End: 2025-06-18 | Stop reason: HOSPADM

## 2025-06-18 RX ORDER — ONDANSETRON 4 MG/1
4 TABLET, ORALLY DISINTEGRATING ORAL EVERY 30 MIN PRN
Status: DISCONTINUED | OUTPATIENT
Start: 2025-06-18 | End: 2025-06-18 | Stop reason: HOSPADM

## 2025-06-18 RX ORDER — CEFAZOLIN SODIUM/WATER 2 G/20 ML
2 SYRINGE (ML) INTRAVENOUS SEE ADMIN INSTRUCTIONS
Status: DISCONTINUED | OUTPATIENT
Start: 2025-06-18 | End: 2025-06-18 | Stop reason: HOSPADM

## 2025-06-18 RX ORDER — BUPIVACAINE HYDROCHLORIDE 2.5 MG/ML
INJECTION, SOLUTION INFILTRATION; PERINEURAL PRN
Status: DISCONTINUED | OUTPATIENT
Start: 2025-06-18 | End: 2025-06-18 | Stop reason: HOSPADM

## 2025-06-18 RX ORDER — ONDANSETRON 2 MG/ML
4 INJECTION INTRAMUSCULAR; INTRAVENOUS EVERY 30 MIN PRN
Status: DISCONTINUED | OUTPATIENT
Start: 2025-06-18 | End: 2025-06-18 | Stop reason: HOSPADM

## 2025-06-18 RX ORDER — LIDOCAINE 40 MG/G
CREAM TOPICAL
Status: DISCONTINUED | OUTPATIENT
Start: 2025-06-18 | End: 2025-06-18 | Stop reason: HOSPADM

## 2025-06-18 RX ORDER — LIDOCAINE HYDROCHLORIDE 10 MG/ML
INJECTION, SOLUTION INFILTRATION; PERINEURAL PRN
Status: DISCONTINUED | OUTPATIENT
Start: 2025-06-18 | End: 2025-06-18

## 2025-06-18 RX ORDER — ONDANSETRON 4 MG/1
4 TABLET, ORALLY DISINTEGRATING ORAL EVERY 8 HOURS PRN
Qty: 4 TABLET | Refills: 0 | Status: SHIPPED | OUTPATIENT
Start: 2025-06-18

## 2025-06-18 RX ORDER — DEXAMETHASONE SODIUM PHOSPHATE 10 MG/ML
INJECTION, SOLUTION INTRAMUSCULAR; INTRAVENOUS PRN
Status: DISCONTINUED | OUTPATIENT
Start: 2025-06-18 | End: 2025-06-18

## 2025-06-18 RX ORDER — ACETAMINOPHEN 325 MG/1
975 TABLET ORAL ONCE
Status: DISCONTINUED | OUTPATIENT
Start: 2025-06-18 | End: 2025-06-18 | Stop reason: HOSPADM

## 2025-06-18 RX ORDER — METRONIDAZOLE 500 MG/100ML
500 INJECTION, SOLUTION INTRAVENOUS EVERY 12 HOURS
Status: COMPLETED | OUTPATIENT
Start: 2025-06-18 | End: 2025-06-18

## 2025-06-18 RX ORDER — DEXAMETHASONE SODIUM PHOSPHATE 4 MG/ML
4 INJECTION, SOLUTION INTRA-ARTICULAR; INTRALESIONAL; INTRAMUSCULAR; INTRAVENOUS; SOFT TISSUE
Status: DISCONTINUED | OUTPATIENT
Start: 2025-06-18 | End: 2025-06-18 | Stop reason: HOSPADM

## 2025-06-18 RX ORDER — HYDROMORPHONE HCL IN WATER/PF 6 MG/30 ML
0.2 PATIENT CONTROLLED ANALGESIA SYRINGE INTRAVENOUS EVERY 5 MIN PRN
Status: DISCONTINUED | OUTPATIENT
Start: 2025-06-18 | End: 2025-06-18 | Stop reason: HOSPADM

## 2025-06-18 RX ORDER — KETOROLAC TROMETHAMINE 30 MG/ML
INJECTION, SOLUTION INTRAMUSCULAR; INTRAVENOUS PRN
Status: DISCONTINUED | OUTPATIENT
Start: 2025-06-18 | End: 2025-06-18

## 2025-06-18 RX ORDER — OXYCODONE HYDROCHLORIDE 5 MG/1
5 TABLET ORAL
Refills: 0 | Status: COMPLETED | OUTPATIENT
Start: 2025-06-18 | End: 2025-06-18

## 2025-06-18 RX ORDER — AMOXICILLIN 250 MG
1-2 CAPSULE ORAL 2 TIMES DAILY PRN
Qty: 30 TABLET | Refills: 0 | Status: SHIPPED | OUTPATIENT
Start: 2025-06-18

## 2025-06-18 RX ORDER — NALOXONE HYDROCHLORIDE 1 MG/ML
0.4 INJECTION INTRAMUSCULAR; INTRAVENOUS; SUBCUTANEOUS
Status: DISCONTINUED | OUTPATIENT
Start: 2025-06-18 | End: 2025-06-18 | Stop reason: HOSPADM

## 2025-06-18 RX ORDER — ONDANSETRON 2 MG/ML
INJECTION INTRAMUSCULAR; INTRAVENOUS PRN
Status: DISCONTINUED | OUTPATIENT
Start: 2025-06-18 | End: 2025-06-18

## 2025-06-18 RX ORDER — FENTANYL CITRATE 50 UG/ML
25 INJECTION, SOLUTION INTRAMUSCULAR; INTRAVENOUS EVERY 5 MIN PRN
Status: DISCONTINUED | OUTPATIENT
Start: 2025-06-18 | End: 2025-06-18 | Stop reason: HOSPADM

## 2025-06-18 RX ORDER — OXYCODONE HYDROCHLORIDE 5 MG/1
5 TABLET ORAL EVERY 4 HOURS PRN
Qty: 5 TABLET | Refills: 0 | Status: SHIPPED | OUTPATIENT
Start: 2025-06-18

## 2025-06-18 RX ORDER — ACETAMINOPHEN 325 MG/1
975 TABLET ORAL EVERY 6 HOURS PRN
Qty: 50 TABLET | Refills: 0 | Status: SHIPPED | OUTPATIENT
Start: 2025-06-18

## 2025-06-18 RX ORDER — OXYCODONE HYDROCHLORIDE 5 MG/1
5 TABLET ORAL
Status: DISCONTINUED | OUTPATIENT
Start: 2025-06-18 | End: 2025-06-18 | Stop reason: HOSPADM

## 2025-06-18 RX ORDER — KETAMINE HYDROCHLORIDE 10 MG/ML
INJECTION INTRAMUSCULAR; INTRAVENOUS PRN
Status: DISCONTINUED | OUTPATIENT
Start: 2025-06-18 | End: 2025-06-18

## 2025-06-18 RX ORDER — NALOXONE HYDROCHLORIDE 1 MG/ML
0.1 INJECTION INTRAMUSCULAR; INTRAVENOUS; SUBCUTANEOUS
Status: DISCONTINUED | OUTPATIENT
Start: 2025-06-18 | End: 2025-06-18 | Stop reason: HOSPADM

## 2025-06-18 RX ORDER — IBUPROFEN 200 MG
800 TABLET ORAL ONCE
Status: DISCONTINUED | OUTPATIENT
Start: 2025-06-18 | End: 2025-06-18 | Stop reason: HOSPADM

## 2025-06-18 RX ORDER — ACETAMINOPHEN 325 MG/1
975 TABLET ORAL ONCE
Status: COMPLETED | OUTPATIENT
Start: 2025-06-18 | End: 2025-06-18

## 2025-06-18 RX ORDER — PROPOFOL 10 MG/ML
INJECTION, EMULSION INTRAVENOUS CONTINUOUS PRN
Status: DISCONTINUED | OUTPATIENT
Start: 2025-06-18 | End: 2025-06-18

## 2025-06-18 RX ORDER — IBUPROFEN 800 MG/1
800 TABLET, FILM COATED ORAL EVERY 6 HOURS PRN
Qty: 30 TABLET | Refills: 0 | Status: SHIPPED | OUTPATIENT
Start: 2025-06-18

## 2025-06-18 RX ORDER — PROPOFOL 10 MG/ML
INJECTION, EMULSION INTRAVENOUS PRN
Status: DISCONTINUED | OUTPATIENT
Start: 2025-06-18 | End: 2025-06-18

## 2025-06-18 RX ORDER — FENTANYL CITRATE 50 UG/ML
50 INJECTION, SOLUTION INTRAMUSCULAR; INTRAVENOUS EVERY 5 MIN PRN
Status: DISCONTINUED | OUTPATIENT
Start: 2025-06-18 | End: 2025-06-18 | Stop reason: HOSPADM

## 2025-06-18 RX ORDER — OXYCODONE HYDROCHLORIDE 5 MG/1
10 TABLET ORAL
Status: DISCONTINUED | OUTPATIENT
Start: 2025-06-18 | End: 2025-06-18 | Stop reason: HOSPADM

## 2025-06-18 RX ORDER — SODIUM CHLORIDE, SODIUM LACTATE, POTASSIUM CHLORIDE, CALCIUM CHLORIDE 600; 310; 30; 20 MG/100ML; MG/100ML; MG/100ML; MG/100ML
INJECTION, SOLUTION INTRAVENOUS CONTINUOUS
Status: DISCONTINUED | OUTPATIENT
Start: 2025-06-18 | End: 2025-06-18 | Stop reason: HOSPADM

## 2025-06-18 RX ORDER — FENTANYL CITRATE 50 UG/ML
INJECTION, SOLUTION INTRAMUSCULAR; INTRAVENOUS PRN
Status: DISCONTINUED | OUTPATIENT
Start: 2025-06-18 | End: 2025-06-18

## 2025-06-18 RX ORDER — SCOPOLAMINE 1 MG/3D
1 PATCH, EXTENDED RELEASE TRANSDERMAL ONCE
Status: DISCONTINUED | OUTPATIENT
Start: 2025-06-18 | End: 2025-06-18 | Stop reason: HOSPADM

## 2025-06-18 RX ORDER — NALOXONE HYDROCHLORIDE 0.4 MG/ML
0.1 INJECTION, SOLUTION INTRAMUSCULAR; INTRAVENOUS; SUBCUTANEOUS
Status: DISCONTINUED | OUTPATIENT
Start: 2025-06-18 | End: 2025-06-18 | Stop reason: HOSPADM

## 2025-06-18 RX ORDER — CEFAZOLIN SODIUM/WATER 2 G/20 ML
2 SYRINGE (ML) INTRAVENOUS
Status: COMPLETED | OUTPATIENT
Start: 2025-06-18 | End: 2025-06-18

## 2025-06-18 RX ADMIN — FENTANYL CITRATE 50 MCG: 50 INJECTION INTRAMUSCULAR; INTRAVENOUS at 12:14

## 2025-06-18 RX ADMIN — LIDOCAINE HYDROCHLORIDE 5 ML: 10 INJECTION, SOLUTION INFILTRATION; PERINEURAL at 09:41

## 2025-06-18 RX ADMIN — KETOROLAC TROMETHAMINE 30 MG: 30 INJECTION, SOLUTION INTRAMUSCULAR at 11:07

## 2025-06-18 RX ADMIN — OXYCODONE HYDROCHLORIDE 5 MG: 5 TABLET ORAL at 13:16

## 2025-06-18 RX ADMIN — HYDROMORPHONE HYDROCHLORIDE 0.5 MG: 1 INJECTION, SOLUTION INTRAMUSCULAR; INTRAVENOUS; SUBCUTANEOUS at 11:06

## 2025-06-18 RX ADMIN — SODIUM CHLORIDE 8 MCG: 9 INJECTION, SOLUTION INTRAVENOUS at 11:05

## 2025-06-18 RX ADMIN — SUGAMMADEX 150 MG: 100 INJECTION, SOLUTION INTRAVENOUS at 11:07

## 2025-06-18 RX ADMIN — SODIUM CHLORIDE, SODIUM LACTATE, POTASSIUM CHLORIDE, AND CALCIUM CHLORIDE: .6; .31; .03; .02 INJECTION, SOLUTION INTRAVENOUS at 11:09

## 2025-06-18 RX ADMIN — PROPOFOL 200 MCG/KG/MIN: 10 INJECTION, EMULSION INTRAVENOUS at 09:41

## 2025-06-18 RX ADMIN — HYDROMORPHONE HYDROCHLORIDE 0.5 MG: 1 INJECTION, SOLUTION INTRAMUSCULAR; INTRAVENOUS; SUBCUTANEOUS at 11:10

## 2025-06-18 RX ADMIN — ONDANSETRON 4 MG: 2 INJECTION INTRAMUSCULAR; INTRAVENOUS at 09:54

## 2025-06-18 RX ADMIN — KETAMINE HYDROCHLORIDE 50 MG: 10 INJECTION INTRAMUSCULAR; INTRAVENOUS at 09:41

## 2025-06-18 RX ADMIN — METRONIDAZOLE 500 MG: 500 INJECTION, SOLUTION INTRAVENOUS at 10:20

## 2025-06-18 RX ADMIN — SODIUM CHLORIDE 12 MCG: 9 INJECTION, SOLUTION INTRAVENOUS at 10:04

## 2025-06-18 RX ADMIN — SCOPOLAMINE 1 PATCH: 1.5 PATCH, EXTENDED RELEASE TRANSDERMAL at 08:37

## 2025-06-18 RX ADMIN — FENTANYL CITRATE 100 MCG: 50 INJECTION, SOLUTION INTRAMUSCULAR; INTRAVENOUS at 09:41

## 2025-06-18 RX ADMIN — DEXAMETHASONE SODIUM PHOSPHATE 10 MG: 10 INJECTION, SOLUTION INTRAMUSCULAR; INTRAVENOUS at 09:54

## 2025-06-18 RX ADMIN — PROPOFOL 150 MG: 10 INJECTION, EMULSION INTRAVENOUS at 09:41

## 2025-06-18 RX ADMIN — Medication 2 G: at 09:38

## 2025-06-18 RX ADMIN — ACETAMINOPHEN 975 MG: 325 TABLET ORAL at 08:36

## 2025-06-18 RX ADMIN — FENTANYL CITRATE 50 MCG: 50 INJECTION INTRAMUSCULAR; INTRAVENOUS at 12:24

## 2025-06-18 RX ADMIN — PHENYLEPHRINE HYDROCHLORIDE 100 MCG: 10 INJECTION INTRAVENOUS at 09:54

## 2025-06-18 RX ADMIN — SODIUM CHLORIDE, SODIUM LACTATE, POTASSIUM CHLORIDE, AND CALCIUM CHLORIDE: .6; .31; .03; .02 INJECTION, SOLUTION INTRAVENOUS at 08:49

## 2025-06-18 RX ADMIN — ROCURONIUM 50 MG: 50 INJECTION, SOLUTION INTRAVENOUS at 09:41

## 2025-06-18 RX ADMIN — MIDAZOLAM HYDROCHLORIDE 2 MG: 1 INJECTION, SOLUTION INTRAMUSCULAR; INTRAVENOUS at 09:33

## 2025-06-18 ASSESSMENT — ACTIVITIES OF DAILY LIVING (ADL)
ADLS_ACUITY_SCORE: 18
ADLS_ACUITY_SCORE: 33
ADLS_ACUITY_SCORE: 18
ADLS_ACUITY_SCORE: 18

## 2025-06-18 NOTE — ANESTHESIA POSTPROCEDURE EVALUATION
Patient: Larissa Elliott    Procedure: Procedure(s):  INTRAUTERINE DEVICE REMOVAL,  ROBOTIC ASSISTED TOTAL LAPAROSCOPIC HYSTERECTOMY, BILATERAL SALPINGECTOMY, CYSTOSCOPY       Anesthesia Type:  General    Note:  Disposition: Outpatient   Postop Pain Control: Uneventful            Sign Out: Well controlled pain   PONV: No   Neuro/Psych: Uneventful            Sign Out: Acceptable/Baseline neuro status   Airway/Respiratory: Uneventful            Sign Out: Acceptable/Baseline resp. status   CV/Hemodynamics: Uneventful            Sign Out: Acceptable CV status; No obvious hypovolemia; No obvious fluid overload   Other NRE: NONE   DID A NON-ROUTINE EVENT OCCUR? No       Last vitals:  Vitals Value Taken Time   /57 06/18/25 12:34   Temp 36.2  C (97.2  F) 06/18/25 12:34   Pulse 73 06/18/25 12:36   Resp 21 06/18/25 12:36   SpO2 97 % 06/18/25 12:36   Vitals shown include unfiled device data.    Electronically Signed By: Sarah Wachter, MD  June 18, 2025  1:10 PM

## 2025-06-18 NOTE — OR NURSING
PT STATED LEVEL OF PAIN 8 /10,PT HAS SLURRED SPEECH & IS UNABLE TO TELL, WRITER IF THAT IS NORMAL FOR HER, NEEDS AROUSAL VITALS STABLE, EXPLAINED TO PT ABOUT MEDICATION  NEED ED BUT ALSO TO BE MONITORING RESP RATE AND VITALS FOR PT SAFETY.   DOES NOT AROUSE ON HER OWN. RESP RATE 19 SATS 100 HR 56 112/7.

## 2025-06-18 NOTE — ANESTHESIA PROCEDURE NOTES
Airway       Patient location during procedure: OR       Procedure Start/Stop Times: 6/18/2025 9:43 AM  Staff -        CRNA: Krystle Arroyo APRN CRNA       Performed By: CRNA  Consent for Airway        Urgency: elective  Indications and Patient Condition       Indications for airway management: dandre-procedural       Induction type:intravenous       Mask difficulty assessment: 1 - vent by mask    Final Airway Details       Final airway type: endotracheal airway       Successful airway: ETT - single  Endotracheal Airway Details        ETT size (mm): 7.0       Cuffed: yes       Cuff volume (mL): 5       Successful intubation technique: direct laryngoscopy       DL Blade Type: MAC 3       Grade View of Cords: 1       Adjucts: stylet       Position: Right       Measured from: gums/teeth       Secured at (cm): 22       Bite block used: Soft    Post intubation assessment        Placement verified by: capnometry, equal breath sounds and chest rise        Number of attempts at approach: 1       Number of other approaches attempted: 0       Secured with: tape       Ease of procedure: easy       Dentition: Intact    Medication(s) Administered   Medication Administration Time: 6/18/2025 9:43 AM

## 2025-06-18 NOTE — OP NOTE
OPERATIVE REPORT     Name: Larissa Elliott    MRN: [id number]    CSN: [id number]    Procedure date: 6/18/2025    PRE-OP DIAGNOSIS: fibroids    POST-OPERATIVE DIAGNOSIS: same     OPERATION: Robotic assisted total laparoscopic hysterectomy, bilateral salpingectomy, cystoscopy     ATTENDING SURGEON: Sander Villegas MD    ASSISTANT(S): Circulator: Julieth Diaz RN  Scrub Person: Ashley Escobar    ANESTHESIA: General ET    URINE OUTPUT: 200 ML.    ESTIMATED BLOOD LOSS: 25 ML.    SPECIMENS: left and right fallopian tubes, uterus and cervix     FINDINGS:   LSC - normal uterus with a 7cm subserosal fibroid localized to the right side, normal adnexa bilaterally, no adhesive disease or endometriosis  Cystoscopy - intact bladder, bilateral ureteral orifices seen effluxing urine    DESCRIPTION OF PROCEDURE:  The patient was informed of the risks, benefits, and alternatives of the above-mentioned procedures and the consent was signed and witnessed. The patient was taken to the operating room and placed in the supine position. General endotracheal anesthesia was achieved without difficulty. The patient was given preoperative prophylactic intravenous antibiotics. The patient was then placed in the dorsal lithotomy position using Tonny stirrups. All pressure points were avoided or padded appropriately and a Sravani Hugger  was placed. Both arms were tucked in anatomical position at the patient s side. The patient was then examined, prepped and draped in the usual sterile fashion. A vieira catheter was introduced into the bladder. A speculum was placed into the vagina and the anterior lip of the cervix grasped with a single tooth tenaculum. The uterus was sounded to 7 cm. A V care uterine manipulator was introduced into the uterus. The tenaculum was removed and the puncture sites hemostatic. The speculum was removed from the vagina.     Attention was turned to the abdomen. A 5mm incision was made 2-3cm above the umbilicus, and  the Veress needle was entered into the peritoneal cavity with an opening pressure of 3 mmHg.  The abdomen was allowed to insufflate to a pressure of 15mmHg. The camera port was then entered into the peritoneal cavity with entry confirmed with exsufflation of air. The robotic laparoscope was then entered into the abdomen and a survey was undertaken with the findings listed above. The patient was placed into trendelenburg and the robotic trocars were placed under direct visualization, two on the right and one on the left. A 5mm assist port was placed in the left upper quadrant. The bowel was swept out of the pelvis and the robot was then docked to the right side of the patient.      At this point attention was turned to performance of the hysterectomy.  The R fallopian tube was tented up and the mesosalpinx was cauterized and cut towards at the cornua. At the cornua the fallopian tube was amputated and removed from the body. Next the R round ligament and the R uteroovarian ligament was coagulated and cut, and the broad ligament between them was dissected and cut. At the posterior uterus the broad ligament was skeletonized down to the  uterosacral ligament.  Anteriorly, the vesicouterine peritoneum was then incised down to the level of the anterior cervix.  The bladder flap was then developed on this side by using monopolar cautery and blunt dissection. The uterine vessels on this side were then further skeletonized.  They were then cauterized and transected using bipolar forceps and monopolar ayad.  The cardinal ligament was then dissected out laterally to the cup on the side. Attention was then turned to the L side of the hysterectomy, where the same procedure was performed. Finally, a colpotomy was made by incising circumferentially along the top of the colpotomy cup using using monopolar energy.      The manipulator was removed from the vagina. The uterus was extracted vaginally. The vaginal cuff was then closed  with 0 V-Lock suture in a running fashion with good approximation of tissue, ensuring a 1 cm margin.  Pelvis was irrigated and good hemostasis was confirmed. The robot was undocked.    We then performed cystoscopy. Arias catheter was removed and a 70-degree laparoscope was introduced into the bladder. An intact bladder was seen and bilateral ureteral jets was demonstrated     Skin incisions were closed with 4-0 Monocryl and 20 cc of 0.5% Marcaine was infiltrated. Patient was taken to the recovery room in a stable condition.    Sander Villegas MD

## 2025-06-18 NOTE — PROGRESS NOTES
Preop Attestation    Patient is a 46yo with symptomatic fibroid uterus presenting for scheduled IUD removal, robotic assisted TLH, BS, cystoscopy. Reviewed intraop plan with the patient. Discussed surgical risks including but not limited to pain, infection, bleeding, damage to surrounding structures including bladder/bowel/nerves; informed consent obtained. No significant changes to med/surg history, preoperative clearance obtained.  [facility]. Proceed to OR.     Sander Villegas MD

## 2025-06-18 NOTE — ANESTHESIA PREPROCEDURE EVALUATION
Anesthesia Pre-Procedure Evaluation    Patient: Larissa Elliott   MRN: [id number] : 1980          Procedure : Procedure(s):  INTRAUTERINE DEVICE REMOVAL,  ROBOTIC ASSISTED TOTAL LAPAROSCOPIC HYSTERECTOMY, BILATERAL SALPINGECTOMY, CYSTOSCOPY         Past Medical History:   Diagnosis Date    Abnormal Pap smear of cervix     Was told Precancerous cells & HPV    Carpal tunnel syndrome of left wrist     History of anesthesia complications     slow wake up    Intramural uterine fibroid     Migraines     Moderate major depression (H) 2021    Overweight     PCOS (polycystic ovarian syndrome)     PONV (postoperative nausea and vomiting)     Symptomatic varicose veins, bilateral       Past Surgical History:   Procedure Laterality Date    BACK SURGERY Left     L5 S1    CONIZATION LEEP N/A 2022    Procedure: LOOP ELECTROSURGICAL EXCISION PROCEDURE (LEEP), REMOVAL AND INSERTION OF MIRENA INTRAUTERINE DEVICE;  Surgeon: Uyen Forbes MD;  Location: [facility]    ENDOMETRIAL ABLATION      GYNECOLOGIC CRYOSURGERY      HC REVISE MEDIAN N/CARPAL TUNNEL SURG Left 2021    Procedure: LEFT CARPAL TUNNEL RELEASE;  Surgeon: Anna Hameed MD;  Location: [facility] OR;  Service: Neurosurgery    LAPAROTOMY EXPLORATORY      LUMBAR DISCECTOMY Left 2015    Procedure: LEFT LUMBAR 4-5 MICRODISCECTOMY AND REVISION LEFT LUMBAR 5-SACRAL 1 MICRODISCECTOMY;  Surgeon: Mick Bolton MD;  Location: [facility] OR;  Service:     LUMBAR DISCECTOMY Left 2015    Procedure: LEFT L4-5 MICRODISCECTOMY AND REVISION LEFT L5-S1 MICRODISCECTOMY;  Surgeon: Mick Bolton MD;  Location: [facility] OR;  Service:     OTHER SURGICAL HISTORY Bilateral     back surgeryDisc ectomy L5/S1    MS LAMNOTMY INCL W/DCMPRSN NRV ROOT 1 INTRSPC LUMBR Right 3/5/2020    Procedure: MICRODISCECTOMY RIGHT LUMBAR 4-5 LATERAL RECESS DECOMPRESSION RIGHT LUMBAR 4-5;  Surgeon: Mick Bolton  MD Ryan;  Location: [facility];  Service: Spine    HI LAMNOTMY INCL W/DCMPRSN NRV ROOT 1 INTRSPC LUMBR Right 6/3/2021    Procedure: RIGHT LUMBAR 4-5 REVISION MICRODISCECTOMY;  Surgeon: Mick Bolton MD;  Location: [facility];  Service: Spine    SPINE SURGERY        Allergies   Allergen Reactions    Adhesive Tape Rash    Latex Rash      Social History     Tobacco Use    Smoking status: Former     Passive exposure: Past    Smokeless tobacco: Never   Substance Use Topics    Alcohol use: Yes     Alcohol/week: 1.0 standard drink of alcohol     Comment: Special occasions only      Wt Readings from Last 1 Encounters:   06/03/25 73.6 kg (162 lb 3.2 oz)        Anesthesia Evaluation   Pt has had prior anesthetic. Type: General.    History of anesthetic complications  - PONV.      ROS/MED HX  ENT/Pulmonary:       Neurologic:       Cardiovascular:       METS/Exercise Tolerance:     Hematologic:       Musculoskeletal:       GI/Hepatic:       Renal/Genitourinary:       Endo: Comment: PCOS      Psychiatric/Substance Use:     (+) psychiatric history depression       Infectious Disease:       Malignancy:       Other:              Physical Exam  Airway  Mallampati: I  TM distance: >3 FB  Neck ROM: full  Mouth opening: >= 4 cm    Cardiovascular - normal exam   Dental   (+) Completely normal teeth      Pulmonary - normal exam      Neurological   She appears awake.    Other Findings       OUTSIDE LABS:  CBC:   Lab Results   Component Value Date    WBC 4.3 06/03/2025    WBC 4.3 09/11/2024    HGB 12.7 06/03/2025    HGB 14.3 09/11/2024    HCT 37.9 06/03/2025    HCT 41.8 09/11/2024     06/03/2025     09/11/2024     BMP:   Lab Results   Component Value Date     06/03/2025     05/27/2021    POTASSIUM 4.5 06/03/2025    POTASSIUM 4.4 05/27/2021    CHLORIDE 105 06/03/2025    CHLORIDE 104 05/27/2021    CO2 24 06/03/2025    CO2 26 05/27/2021    BUN 9.5 06/03/2025    BUN 14 05/27/2021    CR  "0.88 06/03/2025    CR 0.88 05/27/2021    GLC 92 06/03/2025    GLC 83 12/14/2022     COAGS:   Lab Results   Component Value Date    PTT 28 02/19/2021    INR 1.04 09/19/2022     POC:   Lab Results   Component Value Date    HCG Negative 09/26/2022     HEPATIC:   Lab Results   Component Value Date    ALBUMIN 4.2 06/03/2025    PROTTOTAL 6.7 06/03/2025    ALT 34 06/03/2025    AST 34 06/03/2025    ALKPHOS 42 06/03/2025    BILITOTAL 0.2 06/03/2025     OTHER:   Lab Results   Component Value Date    A1C 5.1 09/07/2023    SULAIMAN 9.5 06/03/2025    MAG 1.9 12/05/2019    TSH 0.55 12/14/2022    SED 7 03/13/2024       Anesthesia Plan    ASA Status:  2      NPO Status: NPO Appropriate   Anesthesia Type: General.  Airway: oral.  Induction: intravenous.  Maintenance: TIVA.   Techniques and Equipment:       - Monitoring Plan: standard ASA monitoring     Consents    Anesthesia Plan(s) and associated risks, benefits, and realistic alternatives discussed. Questions answered and patient/representative(s) expressed understanding.     - Discussed: CRNA     - Discussed with:  Patient        - Pt is DNR/DNI Status: no DNR          Postoperative Care    Pain management: multimodal analgesia.     Comments:                   Jose Enrique Ballard MD    I have reviewed the pertinent notes and labs in the chart from the past 30 days and (re)examined the patient.  Any updates or changes from those notes are reflected in this note.    Clinically Significant Risk Factors Present on Admission                             # Overweight: Estimated body mass index is 26.58 kg/m  as calculated from the following:    Height as of 6/3/25: 1.664 m (5' 5.5\").    Weight as of 6/3/25: 73.6 kg (162 lb 3.2 oz).                    "

## 2025-06-20 LAB
PATH REPORT.COMMENTS IMP SPEC: NORMAL
PATH REPORT.COMMENTS IMP SPEC: NORMAL
PATH REPORT.FINAL DX SPEC: NORMAL
PATH REPORT.GROSS SPEC: NORMAL
PATH REPORT.MICROSCOPIC SPEC OTHER STN: NORMAL
PATH REPORT.MICROSCOPIC SPEC OTHER STN: NORMAL
PATH REPORT.RELEVANT HX SPEC: NORMAL
PHOTO IMAGE: NORMAL

## 2025-06-20 PROCEDURE — 88342 IMHCHEM/IMCYTCHM 1ST ANTB: CPT | Mod: 26 | Performed by: PATHOLOGY

## 2025-06-20 PROCEDURE — 88307 TISSUE EXAM BY PATHOLOGIST: CPT | Mod: 26 | Performed by: PATHOLOGY

## 2025-07-22 ENCOUNTER — TELEPHONE (OUTPATIENT)
Dept: FAMILY MEDICINE | Facility: CLINIC | Age: 45
End: 2025-07-22

## 2025-07-22 ENCOUNTER — OFFICE VISIT (OUTPATIENT)
Dept: FAMILY MEDICINE | Facility: CLINIC | Age: 45
End: 2025-07-22
Payer: COMMERCIAL

## 2025-07-22 VITALS
RESPIRATION RATE: 12 BRPM | DIASTOLIC BLOOD PRESSURE: 79 MMHG | TEMPERATURE: 98.3 F | BODY MASS INDEX: 25.23 KG/M2 | HEART RATE: 77 BPM | HEIGHT: 66 IN | OXYGEN SATURATION: 100 % | WEIGHT: 157 LBS | SYSTOLIC BLOOD PRESSURE: 118 MMHG

## 2025-07-22 DIAGNOSIS — M25.642 STIFFNESS OF HAND JOINT, LEFT: ICD-10-CM

## 2025-07-22 DIAGNOSIS — Z13.228 SCREENING FOR METABOLIC DISORDER: ICD-10-CM

## 2025-07-22 DIAGNOSIS — Z76.89 ENCOUNTER FOR WEIGHT MANAGEMENT: ICD-10-CM

## 2025-07-22 DIAGNOSIS — Z11.3 SCREEN FOR STD (SEXUALLY TRANSMITTED DISEASE): Primary | ICD-10-CM

## 2025-07-22 DIAGNOSIS — Z90.710 S/P COMPLETE HYSTERECTOMY: ICD-10-CM

## 2025-07-22 PROBLEM — R87.613 PAPANICOLAOU SMEAR OF CERVIX WITH HIGH GRADE SQUAMOUS INTRAEPITHELIAL LESION (HGSIL): Status: RESOLVED | Noted: 2022-07-14 | Resolved: 2025-07-22

## 2025-07-22 LAB
CHOLEST SERPL-MCNC: 206 MG/DL
EST. AVERAGE GLUCOSE BLD GHB EST-MCNC: 100 MG/DL
FASTING STATUS PATIENT QL REPORTED: ABNORMAL
HBA1C MFR BLD: 5.1 % (ref 0–5.6)
HCV AB SERPL QL IA: NONREACTIVE
HDLC SERPL-MCNC: 73 MG/DL
HIV 1+2 AB+HIV1 P24 AG SERPL QL IA: NONREACTIVE
LDLC SERPL CALC-MCNC: 115 MG/DL
NONHDLC SERPL-MCNC: 133 MG/DL
T PALLIDUM AB SER QL: NONREACTIVE
TRIGL SERPL-MCNC: 91 MG/DL

## 2025-07-22 PROCEDURE — 86780 TREPONEMA PALLIDUM: CPT | Performed by: FAMILY MEDICINE

## 2025-07-22 PROCEDURE — 3074F SYST BP LT 130 MM HG: CPT | Performed by: FAMILY MEDICINE

## 2025-07-22 PROCEDURE — 86038 ANTINUCLEAR ANTIBODIES: CPT | Performed by: FAMILY MEDICINE

## 2025-07-22 PROCEDURE — 83036 HEMOGLOBIN GLYCOSYLATED A1C: CPT | Performed by: FAMILY MEDICINE

## 2025-07-22 PROCEDURE — 80061 LIPID PANEL: CPT | Performed by: FAMILY MEDICINE

## 2025-07-22 PROCEDURE — 3078F DIAST BP <80 MM HG: CPT | Performed by: FAMILY MEDICINE

## 2025-07-22 PROCEDURE — 86039 ANTINUCLEAR ANTIBODIES (ANA): CPT | Performed by: FAMILY MEDICINE

## 2025-07-22 PROCEDURE — 36415 COLL VENOUS BLD VENIPUNCTURE: CPT | Performed by: FAMILY MEDICINE

## 2025-07-22 PROCEDURE — 99213 OFFICE O/P EST LOW 20 MIN: CPT | Performed by: FAMILY MEDICINE

## 2025-07-22 PROCEDURE — 3044F HG A1C LEVEL LT 7.0%: CPT | Performed by: FAMILY MEDICINE

## 2025-07-22 PROCEDURE — 87389 HIV-1 AG W/HIV-1&-2 AB AG IA: CPT | Performed by: FAMILY MEDICINE

## 2025-07-22 PROCEDURE — G2211 COMPLEX E/M VISIT ADD ON: HCPCS | Performed by: FAMILY MEDICINE

## 2025-07-22 PROCEDURE — 86803 HEPATITIS C AB TEST: CPT | Performed by: FAMILY MEDICINE

## 2025-07-22 RX ORDER — PHENTERMINE HYDROCHLORIDE 37.5 MG/1
37.5 CAPSULE ORAL EVERY MORNING
Qty: 90 CAPSULE | Refills: 0 | Status: SHIPPED | OUTPATIENT
Start: 2025-07-22

## 2025-07-22 ASSESSMENT — PATIENT HEALTH QUESTIONNAIRE - PHQ9
SUM OF ALL RESPONSES TO PHQ QUESTIONS 1-9: 1
10. IF YOU CHECKED OFF ANY PROBLEMS, HOW DIFFICULT HAVE THESE PROBLEMS MADE IT FOR YOU TO DO YOUR WORK, TAKE CARE OF THINGS AT HOME, OR GET ALONG WITH OTHER PEOPLE: NOT DIFFICULT AT ALL
SUM OF ALL RESPONSES TO PHQ QUESTIONS 1-9: 1

## 2025-07-22 NOTE — TELEPHONE ENCOUNTER
"[facility] Prior Authorization Team   Phone: [phone]      PRIOR AUTHORIZATION DENIED    Medication: PHENTERMINE HCL 37.5 MG PO CAPS  Insurance Company: [employer] ([facility]) - Phone [phone] Fax [phone]  Denial Date: 7/22/2025  Denial Reason(s):       Appeal Information: To send an appeal to the patient's insurance, please provide a letter of medical necessity for the requested medication that was denied.  Please use the denial rationale from the patient's insurance to write the letter.  Once it is completed, please have it available under the \"letters tab\" in the patient's chart and route directly back to me: EBER MERINO and I can send the appeal to the patient's insurance    Patient Notified: No. The [facility] PA Team does not notify of the denial outcomes as the patient often will ask what their provider will prescribe in place of the denied medication, or additional information in regards to other therapies they can take in place of the denied medication.  This is not something we can advise on in our department    "

## 2025-07-22 NOTE — TELEPHONE ENCOUNTER
07/22/25  Relayed to pt. She is already aware and has been paying for this medication out of pocket.  Vanessa

## 2025-07-22 NOTE — PATIENT INSTRUCTIONS
Based on our discussion, I have outlined the following instructions for you:    -refill phentermine done , continue work on walking and eating healthy and potion control for weight management  - Get blood tests done to check for hepatitis C, HIV, and syphilis.  - Keep following your current plan for managing your weight.  - Have a blood test called TUSHAR to see if there might be an autoimmune disease.  - Get tests to check your cholesterol levels and to see if you have diabetes.    Thank you again for your visit, and we look forward to supporting you in your journey to better health.

## 2025-07-22 NOTE — PROGRESS NOTES
"  Assessment & Plan     Screen for STD (sexually transmitted disease):  - Perform blood tests for hepatitis C, HIV, and syphilis.    Encounter for weight management:  - Weight loss of 5 lbs since last visit. Continuing phentermine with good results. Not using Topamax due to side effects.  - Continue current weight management regimen.    S/P complete hysterectomy:  - Complete hysterectomy performed 5 weeks ago, with removal of cervix, uterus, and tubes; ovaries retained.    Stiffness of hand joint, left:  - Stiffness of hand joint with previous borderline positive TUSHAR.  - Perform TUSHAR test to check for autoimmune disease.    Screening for metabolic disorder:  - Perform cholesterol and diabetes tests.    Consent was obtained from the patient to use an AI documentation tool in the creation of this note.  The longitudinal plan of care for the diagnosis(es)/condition(s) as documented were addressed during this visit. Due to the added complexity in care, I will continue to support Larissa in the subsequent management and with ongoing continuity of care.  BMI  Estimated body mass index is 25.73 kg/m  as calculated from the following:    Height as of this encounter: 1.664 m (5' 5.5\").    Weight as of this encounter: 71.2 kg (157 lb).   Weight management plan: Discussed healthy diet and exercise guidelines    Follow-up       Subjective   Larissa is a 45 year old, presenting for the following health issues:  STD (Testing for STD's, no symptoms.  tested positive for something through life insurance company. Went to primary care afterward and his tests were negative, wanting to make sure. )        7/22/2025    10:13 AM   Additional Questions   Roomed by Pilar GARCIA MA     History of Present Illness       Reason for visit:  Std screening  Symptoms include:  None  What makes it worse:  Na  What makes it better:  Na   She is taking medications regularly.   Larissa STEEN Earl, 45 years    Syphilis Exposure Concern  - Partner tested " "positive for syphilis during a life insurance blood test in 2023, then retested negative at primary care  - Only one sexual partner during this period  - Last full STD panel performed at GYN in 2023    Recent Hysterectomy  - Underwent complete hysterectomy 5 weeks ago (uterus, cervix, and tubes removed; ovaries retained)  - Minimally invasive procedure with vaginal removal  - Large fibroid removed during surgery  - Still in healing phase post-surgery    Hand Joint Stiffness  - Reports stiffness of hand joints  - TUSHAR previously borderline positive; prior result was significantly elevated    Weight Loss Medication  - Currently taking phentermine for weight loss  - Previously took Topamax but discontinued due to concern about side effects (kidney stones) and based on friends' experiences    Misc  - No current need for chlamydia or gonorrhea testing due to recent hysterectomy  Wt Readings from Last 4 Encounters:   07/22/25 71.2 kg (157 lb)   06/18/25 73.1 kg (161 lb 3.2 oz)   06/03/25 73.6 kg (162 lb 3.2 oz)   09/11/24 73 kg (161 lb)                  Review of Systems  Constitutional, neuro, ENT, endocrine, pulmonary, cardiac, gastrointestinal, genitourinary, musculoskeletal, integument and psychiatric systems are negative, except as otherwise noted.      Objective    /79   Pulse 77   Temp 98.3  F (36.8  C) (Oral)   Resp 12   Ht 1.664 m (5' 5.5\")   Wt 71.2 kg (157 lb)   LMP 06/18/2015 (Approximate)   SpO2 100%   BMI 25.73 kg/m    Body mass index is 25.73 kg/m .  Physical Exam   GENERAL: alert and no distress  ABDOMEN: soft, nontender, no hepatosplenomegaly, no masses and bowel sounds normal  MS: no gross musculoskeletal defects noted, no edema    Admission on 06/18/2025, Discharged on 06/18/2025   Component Date Value Ref Range Status    hCG Urine Qualitative 06/18/2025 Negative  Negative Final    This test is for screening purposes.  Results should be interpreted along with the clinical picture.  " Confirmation testing is available if warranted by ordering [id number], HCG Quantitative Pregnancy.    Case Report 06/18/2025    Final                    Value:Surgical Pathology Report                         Case: [id number]                                  Authorizing Provider:  Sander Villegas MD    Collected:           06/18/2025 10:16 AM          Ordering Location:     [facility]      Received:            06/18/2025 01:25 PM                                 [facility]                                                               Pathologist:           Mynor Genao MD                                                     Specimens:   A) - Fallopian Tube, Left, LEFT FALLOPIAN TUBE                                                      B) - Fallopian Tube, Right, RIGHT FALLOPIAN TUBE                                                    C) - Uterus, Cervix, UTERUS CERVIX                                                         Final Diagnosis 06/18/2025    Final                    Value:A.  Left fallopian tube:  - No diagnostic abnormalities of complete cross-section and fimbriated margin of fallopian tube.    B.  Right fallopian tube:  - No diagnostic abnormalities of complete cross-section and fimbriated margin of fallopian tube.    C.  Uterus and cervix:  - Cervix with areas of reactive squamous metaplasia without evidence of dysplasia  - Endometrium, benign inactive without atypia  - Myometrium, no diagnostic abnormalities identified      Clinical Information 06/18/2025    Final                    Value:45-year-old female    Procedure:  INTRAUTERINE DEVICE REMOVAL,  ROBOTIC ASSISTED TOTAL LAPAROSCOPIC HYSTERECTOMY, BILATERAL SALPINGECTOMY, CYSTOSCOPY - Bilateral  Pre-op Diagnosis: Uterine fibroid [D25.9]  Post-op Diagnosis: D25.9 - Uterine fibroid [ICD-10-CM]      Gross Description 06/18/2025    Final                    Value:A(1). Fallopian Tube, Left, LEFT FALLOPIAN TUBE:  Received in  formalin, labeled with the patient's name and  left fallopian tube is a 4.2 x 0.4 cm, pink-red, congested and glistening segment of fallopian tube.  There is a 0.1 cm, smooth lined paratubal cyst, 2.6 cm from the fimbriated end.  No excrescences are identified and the fallopian tube has an unremarkable cut surface.  Representative sections-1 cassette  B(2). Fallopian Tube, Right, RIGHT FALLOPIAN TUBE:  Received in formalin, labeled with the patient's name and right fallopian tube is a 4.7 x 0.4 cm, pink-red, congested and fimbriated segment of fallopian tube.  There are several 0.1 cm, smooth lined paratubal cysts near the resection end.  No excrescences are identified and the fallopian tube has an unremarkable cut surface.  Representative sections-1 cassette  C(3). Uterus, Cervix, UTERUS CERVIX:  Received in formalin, labeled with the patient's name and uterus, cervix is a 166 g, laterally distorted, 7.1 cm in length, 8.4                           cm in fundic width, 5.7 cm in A-P diameter uterus.  No adnexal tissues are identified.  The right lateral wall is distorted by an intramural, white, rubbery and whorled nodule.  The ectocervix is white-pink, smooth and glistening.  The anterior cervix is inked blue and the posterior cervix is inked black.  Opening displays a tan-pink, and glistening endocervical canal.  There is an ill-defined transformation zone on the left lateral wall.  The cervix is submitted in its entirety in a clockwise fashion starting at the mid anterior/12:00 aspect.  The 4.2 x 1.7 cm endometrial cavity is lined by tan-pink, glistening endometrium averaging 0.2 cm in thickness.  No excrescences, polyps or suspicious indurated lesions are identified.  The myometrium ranges from 1.3 cm to 4.4 cm in thickness.  No additional areas of nodularity are identified.  No suspicious indurated foci or areas of hemorrhagic necrosis are identified upon sectioning through the 4.7 cm intramural nodule.  The  remaining                           myometrium is tan-pink, finely trabeculated and grossly unremarkable.  The serosa is tan-pink to red, congested and glistening.  There are multiple threadlike fibrous adhesions identified on the posterior surface.  No excrescences or nodular lesions are identified.  Representative sections-22 cassettes    Summary of cassettes  1-3 cervix 12-3 o'clock  4-6 cervix 3-6 o'clock  7-10 cervix 6-9 o'clock  11-14 cervix 9-12 o'clock  15-16 anterior endomyometrium  17-18 posterior endomyometrium  19 serosa  20-22 myometrial nodule to include areas of serosal adhesions     TAYLOR Marie        Microscopic Description 06/18/2025    Final                    Value:Microscopic examination is performed with findings supportive of the diagnosis as noted.      Special Stains 06/18/2025    Final                    Value:Immunohistochemical stains for p16 were performed on blocks C2, C3 and C4 showing a negative pattern of reaction reflective of no evidence of high-grade dysplasia.    Note:  These immunohistochemical stains are deemed medically necessary.  Appropriate, positive staining control tissues are reviewed concurrently showing a reaction pattern approriate for interpretation.  The findings substantiate the final diagnosis      Performing Labs 06/18/2025    Final                    Value:The technical component of this testing was completed at [facility].    Stain controls for all stains resulted within this report have been reviewed and show appropriate reactivity.              Signed Electronically by: Orly Almanza MD

## 2025-07-23 LAB
ANA PAT SER IF-IMP: ABNORMAL
ANA PAT SER IF-IMP: ABNORMAL
ANA SER QL IF: POSITIVE
ANA TITR SER IF: ABNORMAL {TITER}
ANA TITR SER IF: ABNORMAL {TITER}

## 2025-08-12 ENCOUNTER — PATIENT OUTREACH (OUTPATIENT)
Dept: CARE COORDINATION | Facility: CLINIC | Age: 45
End: 2025-08-12
Payer: COMMERCIAL

## 2025-08-26 ENCOUNTER — PATIENT OUTREACH (OUTPATIENT)
Dept: CARE COORDINATION | Facility: CLINIC | Age: 45
End: 2025-08-26
Payer: COMMERCIAL

## (undated) DEVICE — GLOVE BIOGEL PI ULTRATOUCH G SZ 6.0 42160

## (undated) DEVICE — NEEDLE HYPO MAGELLAN SAFETY 22GA 1 1/2IN 8881850215

## (undated) DEVICE — SU DERMABOND ADVANCED .7ML DNX12

## (undated) DEVICE — TROCAR PORT BLADELESS 5X100MM IAS5-100LP

## (undated) DEVICE — NDL INSUFFLATION 13GA 120MM C2201

## (undated) DEVICE — PREP POVIDONE-IODINE 10% SOLUTION 4OZ BOTTLE MDS093944

## (undated) DEVICE — SYR 50ML LL W/O NDL 309653

## (undated) DEVICE — PACK TRENGUARD 450 PROCEDURAL 2065406

## (undated) DEVICE — TUBING IRR LG BORE TUBE DRIP CHMBR 2 BG 94IN 313003

## (undated) DEVICE — SUCTION MANIFOLD NEPTUNE 2 SYS 1 PORT 702-025-000

## (undated) DEVICE — CUSTOM PACK DA VINCI GYN SMA5BDVHEA

## (undated) DEVICE — SOL WATER IRRIG 1000ML BOTTLE 2F7114

## (undated) DEVICE — PREP DYNA-HEX 4% CHG SCRUB 4OZ BOTTLE MDS098710

## (undated) DEVICE — DAVINCI HOT SHEARS TIP COVER  400180

## (undated) DEVICE — PREP POVIDONE-IODINE 7.5% SCRUB 4OZ BOTTLE MDS093945

## (undated) DEVICE — ANTIFOG SOLUTION SEE SHARP 150M TROCAR SWABS 30978 (COI)

## (undated) DEVICE — SOLUTION IV 2B0304X STRL WATER 1000ML

## (undated) DEVICE — PRTC STD XTRMT TRND ARM HKLP CLSR LF DISP TAP100

## (undated) DEVICE — BLADE KNIFE SURG 11 371111

## (undated) DEVICE — PREP CHLORAPREP 26ML TINTED HI-LITE ORANGE 930815

## (undated) DEVICE — DAVINCI XI SEAL UNIVERSAL 5-12MM 470500

## (undated) DEVICE — BRIEF STRETCH XL MPS40

## (undated) DEVICE — DAVINCI XI DRAPE ARM 470015

## (undated) DEVICE — SU STRATAFIX PDS PLUS 0 CT-1 30CM SXPP1B450

## (undated) DEVICE — SU MONOCRYL+ 4-0 18IN PS2 UND MCP496G

## (undated) DEVICE — DRAPE SHEET TABLE COVER KC 42301*

## (undated) DEVICE — RETR ELEV / UTERINE MANIPULATOR V-CARE MED CUP 60-6085-201A

## (undated) DEVICE — TUBING IRR TUR Y TYPE 2C4041

## (undated) DEVICE — MAT FLOOR WATERPROOF BACKSHEET FMBP30

## (undated) DEVICE — DRSG TELFA 3X4" 1050

## (undated) DEVICE — SUCTION YANKAUER W/VENT 50132

## (undated) DEVICE — DAVINCI XI DRAPE COLUMN 470341

## (undated) DEVICE — VIAL DECANTER STERILE WHITE DYNJDEC06

## (undated) DEVICE — GLOVE UNDER INDICATOR PI SZ 6 LF 41660

## (undated) DEVICE — GOWN LG DISP 9515

## (undated) DEVICE — ENDO SHEARS RENEW LAP ENDOCUT SCISSOR TIP 16.5MM 3142

## (undated) DEVICE — TUBING FILTER TRI-LUMEN AIRSEAL ASC-EVAC1

## (undated) DEVICE — GOWN IMPERVIOUS BREATHABLE SMART LG 89015

## (undated) DEVICE — SYR 50ML SLIP TIP W/O NDL 309654

## (undated) DEVICE — DAVINCI XI OBTURATOR BLADELESS 8MM 470359

## (undated) DEVICE — KOH COLPOTOMIZER OCCLUDER  CPO-6

## (undated) DEVICE — DRAPE POUCH INSTRUMENT 3 POCKET 1018L

## (undated) DEVICE — LUBRICANT INST ELECTROLUBE EL101

## (undated) DEVICE — SUCTION STRYKERFLOW II 250-070-500

## (undated) DEVICE — KIT POSITIONER NUBLUE XL TRND CHST PD BD STRAP TP3000E-S-NB

## (undated) RX ORDER — BUPIVACAINE HYDROCHLORIDE 2.5 MG/ML
INJECTION, SOLUTION EPIDURAL; INFILTRATION; INTRACAUDAL; PERINEURAL
Status: DISPENSED
Start: 2025-06-18

## (undated) RX ORDER — KETOROLAC TROMETHAMINE 30 MG/ML
INJECTION, SOLUTION INTRAMUSCULAR; INTRAVENOUS
Status: DISPENSED
Start: 2025-06-18

## (undated) RX ORDER — FENTANYL CITRATE 50 UG/ML
INJECTION, SOLUTION INTRAMUSCULAR; INTRAVENOUS
Status: DISPENSED
Start: 2025-06-18

## (undated) RX ORDER — CEFAZOLIN SODIUM 1 G/3ML
INJECTION, POWDER, FOR SOLUTION INTRAMUSCULAR; INTRAVENOUS
Status: DISPENSED
Start: 2025-06-18

## (undated) RX ORDER — PROPOFOL 10 MG/ML
INJECTION, EMULSION INTRAVENOUS
Status: DISPENSED
Start: 2025-06-18

## (undated) RX ORDER — LIDOCAINE HYDROCHLORIDE 10 MG/ML
INJECTION, SOLUTION EPIDURAL; INFILTRATION; INTRACAUDAL; PERINEURAL
Status: DISPENSED
Start: 2025-06-18